# Patient Record
Sex: FEMALE | Race: WHITE | Employment: FULL TIME | URBAN - METROPOLITAN AREA
[De-identification: names, ages, dates, MRNs, and addresses within clinical notes are randomized per-mention and may not be internally consistent; named-entity substitution may affect disease eponyms.]

---

## 2017-01-04 DIAGNOSIS — R07.2 PRECORDIAL PAIN: ICD-10-CM

## 2017-01-04 DIAGNOSIS — R00.2 PALPITATIONS: ICD-10-CM

## 2017-01-04 RX ORDER — IBUPROFEN 800 MG/1
800 TABLET ORAL 3 TIMES DAILY
Qty: 90 TABLET | Refills: 0 | Status: SHIPPED | OUTPATIENT
Start: 2017-01-04 | End: 2018-03-04 | Stop reason: ALTCHOICE

## 2017-04-19 ENCOUNTER — PATIENT MESSAGE (OUTPATIENT)
Dept: FAMILY MEDICINE CLINIC | Age: 27
End: 2017-04-19

## 2017-04-19 RX ORDER — TRAMADOL HYDROCHLORIDE 50 MG/1
TABLET ORAL
Qty: 30 TABLET | Refills: 0 | Status: SHIPPED | OUTPATIENT
Start: 2017-04-19 | End: 2018-03-04 | Stop reason: ALTCHOICE

## 2017-04-27 ENCOUNTER — TELEPHONE (OUTPATIENT)
Dept: FAMILY MEDICINE CLINIC | Age: 27
End: 2017-04-27

## 2017-06-06 RX ORDER — LEVONORGESTREL AND ETHINYL ESTRADIOL 0.15-0.03
1 KIT ORAL DAILY
Qty: 1 PACKET | Refills: 3 | Status: SHIPPED | OUTPATIENT
Start: 2017-06-06 | End: 2019-01-02

## 2017-06-12 ENCOUNTER — TELEPHONE (OUTPATIENT)
Dept: FAMILY MEDICINE CLINIC | Age: 27
End: 2017-06-12

## 2017-06-29 RX ORDER — LORAZEPAM 0.5 MG/1
TABLET ORAL
Qty: 30 TABLET | Refills: 0 | Status: SHIPPED | OUTPATIENT
Start: 2017-06-29 | End: 2018-03-04 | Stop reason: ALTCHOICE

## 2017-06-29 RX ORDER — BUPROPION HYDROCHLORIDE 150 MG/1
150 TABLET ORAL EVERY MORNING
Qty: 30 TABLET | Refills: 1 | Status: SHIPPED | OUTPATIENT
Start: 2017-06-29 | End: 2018-03-04 | Stop reason: ALTCHOICE

## 2019-01-02 ENCOUNTER — OFFICE VISIT (OUTPATIENT)
Dept: FAMILY MEDICINE CLINIC | Age: 29
End: 2019-01-02
Payer: COMMERCIAL

## 2019-01-02 VITALS
BODY MASS INDEX: 44.22 KG/M2 | OXYGEN SATURATION: 99 % | WEIGHT: 274 LBS | DIASTOLIC BLOOD PRESSURE: 64 MMHG | HEART RATE: 92 BPM | SYSTOLIC BLOOD PRESSURE: 134 MMHG

## 2019-01-02 DIAGNOSIS — N64.4 MASTALGIA: Primary | ICD-10-CM

## 2019-01-02 PROCEDURE — G8427 DOCREV CUR MEDS BY ELIG CLIN: HCPCS | Performed by: PHYSICIAN ASSISTANT

## 2019-01-02 PROCEDURE — 99213 OFFICE O/P EST LOW 20 MIN: CPT | Performed by: PHYSICIAN ASSISTANT

## 2019-01-02 PROCEDURE — G8417 CALC BMI ABV UP PARAM F/U: HCPCS | Performed by: PHYSICIAN ASSISTANT

## 2019-01-02 PROCEDURE — 1036F TOBACCO NON-USER: CPT | Performed by: PHYSICIAN ASSISTANT

## 2019-01-02 PROCEDURE — G8484 FLU IMMUNIZE NO ADMIN: HCPCS | Performed by: PHYSICIAN ASSISTANT

## 2019-01-02 ASSESSMENT — ENCOUNTER SYMPTOMS
VOMITING: 0
ABDOMINAL PAIN: 0
RHINORRHEA: 0
COUGH: 0
CONSTIPATION: 0
SHORTNESS OF BREATH: 0
NAUSEA: 0
SORE THROAT: 0
DIARRHEA: 0

## 2019-01-02 ASSESSMENT — PATIENT HEALTH QUESTIONNAIRE - PHQ9
SUM OF ALL RESPONSES TO PHQ QUESTIONS 1-9: 0
2. FEELING DOWN, DEPRESSED OR HOPELESS: 0
SUM OF ALL RESPONSES TO PHQ QUESTIONS 1-9: 0
1. LITTLE INTEREST OR PLEASURE IN DOING THINGS: 0
SUM OF ALL RESPONSES TO PHQ9 QUESTIONS 1 & 2: 0

## 2019-01-03 ENCOUNTER — HOSPITAL ENCOUNTER (OUTPATIENT)
Dept: WOMENS IMAGING | Age: 29
Discharge: HOME | End: 2019-01-03
Payer: COMMERCIAL

## 2019-01-03 DIAGNOSIS — N63.0 SWELLING OF BREAST: ICD-10-CM

## 2019-01-03 DIAGNOSIS — N63.0 LUMP OR MASS IN BREAST: ICD-10-CM

## 2019-01-03 PROCEDURE — 76642 ULTRASOUND BREAST LIMITED: CPT

## 2019-01-04 ENCOUNTER — HOSPITAL ENCOUNTER (OUTPATIENT)
Dept: WOMENS IMAGING | Age: 29
Discharge: HOME | End: 2019-01-04
Payer: COMMERCIAL

## 2019-01-04 ENCOUNTER — OFFICE VISIT (OUTPATIENT)
Dept: FAMILY MEDICINE CLINIC | Age: 29
End: 2019-01-04
Payer: COMMERCIAL

## 2019-01-04 VITALS
OXYGEN SATURATION: 98 % | TEMPERATURE: 98.6 F | WEIGHT: 271 LBS | DIASTOLIC BLOOD PRESSURE: 70 MMHG | BODY MASS INDEX: 43.74 KG/M2 | SYSTOLIC BLOOD PRESSURE: 110 MMHG | HEART RATE: 69 BPM

## 2019-01-04 DIAGNOSIS — N64.4 BREAST PAIN: ICD-10-CM

## 2019-01-04 DIAGNOSIS — N64.4 BREAST PAIN: Primary | ICD-10-CM

## 2019-01-04 DIAGNOSIS — R92.8 ABNORMAL SCREENING MAMMOGRAM: Primary | ICD-10-CM

## 2019-01-04 DIAGNOSIS — N61.0 CELLULITIS OF FEMALE BREAST: ICD-10-CM

## 2019-01-04 PROCEDURE — 99214 OFFICE O/P EST MOD 30 MIN: CPT | Performed by: FAMILY MEDICINE

## 2019-01-04 PROCEDURE — G0279 TOMOSYNTHESIS, MAMMO: HCPCS

## 2019-01-04 PROCEDURE — G8427 DOCREV CUR MEDS BY ELIG CLIN: HCPCS | Performed by: FAMILY MEDICINE

## 2019-01-04 PROCEDURE — G8417 CALC BMI ABV UP PARAM F/U: HCPCS | Performed by: FAMILY MEDICINE

## 2019-01-04 PROCEDURE — G8484 FLU IMMUNIZE NO ADMIN: HCPCS | Performed by: FAMILY MEDICINE

## 2019-01-04 PROCEDURE — 1036F TOBACCO NON-USER: CPT | Performed by: FAMILY MEDICINE

## 2019-01-04 RX ORDER — DOXYCYCLINE HYCLATE 100 MG
100 TABLET ORAL 2 TIMES DAILY
Qty: 14 TABLET | Refills: 0 | Status: SHIPPED | OUTPATIENT
Start: 2019-01-04 | End: 2019-01-11

## 2019-01-04 ASSESSMENT — ENCOUNTER SYMPTOMS
NAUSEA: 0
SHORTNESS OF BREATH: 0
COLOR CHANGE: 1

## 2019-01-07 ENCOUNTER — TELEPHONE (OUTPATIENT)
Dept: FAMILY MEDICINE CLINIC | Age: 29
End: 2019-01-07

## 2019-01-07 DIAGNOSIS — N64.4 BREAST PAIN: Primary | ICD-10-CM

## 2019-01-07 RX ORDER — NAPROXEN 500 MG/1
500 TABLET ORAL 2 TIMES DAILY WITH MEALS
Qty: 60 TABLET | Refills: 1 | Status: SHIPPED | OUTPATIENT
Start: 2019-01-07 | End: 2019-05-24

## 2019-01-08 ENCOUNTER — TELEPHONE (OUTPATIENT)
Dept: FAMILY MEDICINE CLINIC | Age: 29
End: 2019-01-08

## 2019-05-24 ENCOUNTER — OFFICE VISIT (OUTPATIENT)
Dept: FAMILY MEDICINE CLINIC | Age: 29
End: 2019-05-24
Payer: COMMERCIAL

## 2019-05-24 VITALS
HEIGHT: 67 IN | WEIGHT: 269.6 LBS | DIASTOLIC BLOOD PRESSURE: 80 MMHG | SYSTOLIC BLOOD PRESSURE: 140 MMHG | BODY MASS INDEX: 42.31 KG/M2 | OXYGEN SATURATION: 98 % | HEART RATE: 89 BPM

## 2019-05-24 DIAGNOSIS — M54.12 LEFT CERVICAL RADICULOPATHY: ICD-10-CM

## 2019-05-24 DIAGNOSIS — F41.9 ANXIETY: ICD-10-CM

## 2019-05-24 DIAGNOSIS — M76.899 TENDINITIS OF HIP, UNSPECIFIED LATERALITY: ICD-10-CM

## 2019-05-24 DIAGNOSIS — Z00.00 ANNUAL PHYSICAL EXAM: Primary | ICD-10-CM

## 2019-05-24 PROCEDURE — 99395 PREV VISIT EST AGE 18-39: CPT | Performed by: FAMILY MEDICINE

## 2019-05-24 RX ORDER — GABAPENTIN 600 MG/1
TABLET ORAL
Qty: 60 TABLET | Refills: 1 | Status: SHIPPED | OUTPATIENT
Start: 2019-05-24 | End: 2019-06-17 | Stop reason: SDUPTHER

## 2019-05-24 ASSESSMENT — ENCOUNTER SYMPTOMS
COUGH: 0
ABDOMINAL PAIN: 0
SHORTNESS OF BREATH: 0
BLOOD IN STOOL: 0

## 2019-05-24 NOTE — PATIENT INSTRUCTIONS
nnual physical exam  -     Lipid Panel; Future  -     Comprehensive Metabolic Panel; Future  -     C-reactive protein;  Future  -     CBC Auto Differential; Future  AGC-work on wt loss  Anxiety    Tendinitis of hip, unspecified laterality  Rx Gabap 600  Left cervical radiculopathy  As above

## 2019-05-24 NOTE — PROGRESS NOTES
ext & Lt rotation>tingling Lt arm---very tender both Gr Tr   Neurological: She is alert and oriented to person, place, and time. Psychiatric: She has a normal mood and affect. Her behavior is normal. Judgment and thought content normal.       Assessment:      1. Annual physical exam    2. Anxiety    3. Tendinitis of hip, unspecified laterality    4. Left cervical radiculopathy            Plan:      Jose M Xavier was seen today for annual exam.    Diagnoses and all orders for this visit:    Annual physical exam  -     Lipid Panel; Future  -     Comprehensive Metabolic Panel; Future  -     C-reactive protein;  Future  -     CBC Auto Differential; Future  AGC-work on wt loss  Anxiety  Consider anti depress  Tendinitis of hip, unspecified laterality  Rx Gabap 600  Left cervical radiculopathy  As above            Marc Verdin, DO

## 2019-05-25 ENCOUNTER — HOSPITAL ENCOUNTER (OUTPATIENT)
Age: 29
Discharge: HOME | End: 2019-05-25
Payer: COMMERCIAL

## 2019-05-25 DIAGNOSIS — Z00.00 ANNUAL PHYSICAL EXAM: ICD-10-CM

## 2019-05-25 LAB
A/G RATIO: 1.2 (ref 1.1–2.2)
ALBUMIN SERPL-MCNC: 4.2 G/DL (ref 3.4–5)
ALP BLD-CCNC: 97 U/L (ref 40–129)
ALT SERPL-CCNC: 17 U/L (ref 10–40)
ANION GAP SERPL CALCULATED.3IONS-SCNC: 13 MMOL/L (ref 3–16)
AST SERPL-CCNC: 17 U/L (ref 15–37)
BASOPHILS ABSOLUTE: 0 K/UL (ref 0–0.2)
BASOPHILS RELATIVE PERCENT: 0.7 %
BILIRUB SERPL-MCNC: 0.6 MG/DL (ref 0–1)
BUN BLDV-MCNC: 10 MG/DL (ref 7–20)
CALCIUM SERPL-MCNC: 8.7 MG/DL (ref 8.3–10.6)
CHLORIDE BLD-SCNC: 100 MMOL/L (ref 99–110)
CHOLESTEROL, TOTAL: 121 MG/DL (ref 0–199)
CO2: 25 MMOL/L (ref 21–32)
CREAT SERPL-MCNC: 0.8 MG/DL (ref 0.6–1.1)
EOSINOPHILS ABSOLUTE: 0.1 K/UL (ref 0–0.6)
EOSINOPHILS RELATIVE PERCENT: 2 %
GFR AFRICAN AMERICAN: >60
GFR NON-AFRICAN AMERICAN: >60
GLOBULIN: 3.4 G/DL
GLUCOSE BLD-MCNC: 92 MG/DL (ref 70–99)
HCT VFR BLD CALC: 37.3 % (ref 36–48)
HDLC SERPL-MCNC: 34 MG/DL (ref 40–60)
HEMOGLOBIN: 12.1 G/DL (ref 12–16)
LDL CHOLESTEROL CALCULATED: 77 MG/DL
LYMPHOCYTES ABSOLUTE: 1.6 K/UL (ref 1–5.1)
LYMPHOCYTES RELATIVE PERCENT: 24.6 %
MCH RBC QN AUTO: 26.1 PG (ref 26–34)
MCHC RBC AUTO-ENTMCNC: 32.5 G/DL (ref 31–36)
MCV RBC AUTO: 80.3 FL (ref 80–100)
MONOCYTES ABSOLUTE: 0.3 K/UL (ref 0–1.3)
MONOCYTES RELATIVE PERCENT: 5.2 %
NEUTROPHILS ABSOLUTE: 4.3 K/UL (ref 1.7–7.7)
NEUTROPHILS RELATIVE PERCENT: 67.5 %
PDW BLD-RTO: 14.5 % (ref 12.4–15.4)
PLATELET # BLD: 311 K/UL (ref 135–450)
PMV BLD AUTO: 8.9 FL (ref 5–10.5)
POTASSIUM SERPL-SCNC: 4.1 MMOL/L (ref 3.5–5.1)
RBC # BLD: 4.64 M/UL (ref 4–5.2)
SODIUM BLD-SCNC: 138 MMOL/L (ref 136–145)
TOTAL PROTEIN: 7.6 G/DL (ref 6.4–8.2)
TRIGL SERPL-MCNC: 52 MG/DL (ref 0–150)
VLDLC SERPL CALC-MCNC: 10 MG/DL
WBC # BLD: 6.4 K/UL (ref 4–11)

## 2019-05-25 PROCEDURE — 80053 COMPREHEN METABOLIC PANEL: CPT

## 2019-05-25 PROCEDURE — 85025 COMPLETE CBC W/AUTO DIFF WBC: CPT

## 2019-05-25 PROCEDURE — 86140 C-REACTIVE PROTEIN: CPT

## 2019-05-25 PROCEDURE — 36415 COLL VENOUS BLD VENIPUNCTURE: CPT

## 2019-05-25 PROCEDURE — 80061 LIPID PANEL: CPT

## 2019-05-28 LAB — C-REACTIVE PROTEIN: 9.7 MG/L (ref 0–5.1)

## 2019-06-03 ENCOUNTER — NURSE ONLY (OUTPATIENT)
Dept: FAMILY MEDICINE CLINIC | Age: 29
End: 2019-06-03

## 2019-06-03 VITALS — SYSTOLIC BLOOD PRESSURE: 130 MMHG | DIASTOLIC BLOOD PRESSURE: 84 MMHG

## 2019-06-03 DIAGNOSIS — Z00.00 ANNUAL PHYSICAL EXAM: Primary | ICD-10-CM

## 2019-06-06 DIAGNOSIS — F41.9 ANXIETY: Primary | ICD-10-CM

## 2019-06-06 RX ORDER — LORAZEPAM 0.5 MG/1
TABLET ORAL
Qty: 25 TABLET | Refills: 0 | Status: SHIPPED | OUTPATIENT
Start: 2019-06-06 | End: 2019-07-09 | Stop reason: SDUPTHER

## 2019-06-17 ENCOUNTER — TELEPHONE (OUTPATIENT)
Dept: FAMILY MEDICINE CLINIC | Age: 29
End: 2019-06-17

## 2019-06-17 DIAGNOSIS — E66.9 OBESITY (BMI 30-39.9): Primary | ICD-10-CM

## 2019-06-17 DIAGNOSIS — E66.01 CLASS 3 SEVERE OBESITY WITHOUT SERIOUS COMORBIDITY WITH BODY MASS INDEX (BMI) OF 40.0 TO 44.9 IN ADULT, UNSPECIFIED OBESITY TYPE (HCC): ICD-10-CM

## 2019-06-17 RX ORDER — GABAPENTIN 600 MG/1
TABLET ORAL
Qty: 90 TABLET | Refills: 1 | Status: SHIPPED | OUTPATIENT
Start: 2019-06-17 | End: 2019-08-14 | Stop reason: SDUPTHER

## 2019-07-09 DIAGNOSIS — F41.9 ANXIETY: ICD-10-CM

## 2019-07-09 RX ORDER — LORAZEPAM 0.5 MG/1
TABLET ORAL
Qty: 25 TABLET | Refills: 0 | Status: SHIPPED | OUTPATIENT
Start: 2019-07-09 | End: 2019-08-08

## 2019-07-29 ENCOUNTER — TELEPHONE (OUTPATIENT)
Dept: INTERNAL MEDICINE CLINIC | Age: 29
End: 2019-07-29

## 2019-08-14 RX ORDER — GABAPENTIN 600 MG/1
TABLET ORAL
Qty: 90 TABLET | Refills: 1 | Status: SHIPPED | OUTPATIENT
Start: 2019-08-14 | End: 2019-10-10 | Stop reason: SDUPTHER

## 2019-08-28 ENCOUNTER — ANESTHESIA EVENT (OUTPATIENT)
Dept: OPERATING ROOM | Age: 29
End: 2019-08-28
Payer: COMMERCIAL

## 2019-08-29 ENCOUNTER — ANESTHESIA (OUTPATIENT)
Dept: OPERATING ROOM | Age: 29
End: 2019-08-29
Payer: COMMERCIAL

## 2019-08-29 ENCOUNTER — HOSPITAL ENCOUNTER (OUTPATIENT)
Age: 29
Setting detail: OUTPATIENT SURGERY
Discharge: HOME | End: 2019-08-29
Attending: SURGERY | Admitting: SURGERY
Payer: COMMERCIAL

## 2019-08-29 VITALS
TEMPERATURE: 98.1 F | SYSTOLIC BLOOD PRESSURE: 116 MMHG | HEIGHT: 67 IN | HEART RATE: 70 BPM | RESPIRATION RATE: 18 BRPM | OXYGEN SATURATION: 99 % | DIASTOLIC BLOOD PRESSURE: 66 MMHG | BODY MASS INDEX: 42.38 KG/M2 | WEIGHT: 270 LBS

## 2019-08-29 VITALS
OXYGEN SATURATION: 94 % | RESPIRATION RATE: 13 BRPM | DIASTOLIC BLOOD PRESSURE: 55 MMHG | SYSTOLIC BLOOD PRESSURE: 107 MMHG

## 2019-08-29 DIAGNOSIS — N61.1 ABSCESS OF RIGHT BREAST: ICD-10-CM

## 2019-08-29 LAB
BASOPHILS ABSOLUTE: 0.1 K/UL (ref 0–0.2)
BASOPHILS RELATIVE PERCENT: 0.7 %
EOSINOPHILS ABSOLUTE: 0.2 K/UL (ref 0–0.6)
EOSINOPHILS RELATIVE PERCENT: 3.3 %
HCT VFR BLD CALC: 37.1 % (ref 36–48)
HEMOGLOBIN: 12.2 G/DL (ref 12–16)
LYMPHOCYTES ABSOLUTE: 1.6 K/UL (ref 1–5.1)
LYMPHOCYTES RELATIVE PERCENT: 22.5 %
MCH RBC QN AUTO: 26.7 PG (ref 26–34)
MCHC RBC AUTO-ENTMCNC: 32.9 G/DL (ref 31–36)
MCV RBC AUTO: 81 FL (ref 80–100)
MONOCYTES ABSOLUTE: 0.5 K/UL (ref 0–1.3)
MONOCYTES RELATIVE PERCENT: 6.4 %
NEUTROPHILS ABSOLUTE: 4.7 K/UL (ref 1.7–7.7)
NEUTROPHILS RELATIVE PERCENT: 67.1 %
PDW BLD-RTO: 14.3 % (ref 12.4–15.4)
PLATELET # BLD: 273 K/UL (ref 135–450)
PMV BLD AUTO: 8.5 FL (ref 5–10.5)
PREGNANCY, URINE: NEGATIVE
RBC # BLD: 4.58 M/UL (ref 4–5.2)
WBC # BLD: 7.1 K/UL (ref 4–11)

## 2019-08-29 PROCEDURE — 2580000003 HC RX 258: Performed by: ANESTHESIOLOGY

## 2019-08-29 PROCEDURE — 7100000001 HC PACU RECOVERY - ADDTL 15 MIN: Performed by: SURGERY

## 2019-08-29 PROCEDURE — 87070 CULTURE OTHR SPECIMN AEROBIC: CPT

## 2019-08-29 PROCEDURE — 2500000003 HC RX 250 WO HCPCS: Performed by: SURGERY

## 2019-08-29 PROCEDURE — 2500000003 HC RX 250 WO HCPCS: Performed by: NURSE ANESTHETIST, CERTIFIED REGISTERED

## 2019-08-29 PROCEDURE — 2580000003 HC RX 258: Performed by: SURGERY

## 2019-08-29 PROCEDURE — 87075 CULTR BACTERIA EXCEPT BLOOD: CPT

## 2019-08-29 PROCEDURE — 7100000010 HC PHASE II RECOVERY - FIRST 15 MIN: Performed by: SURGERY

## 2019-08-29 PROCEDURE — 88305 TISSUE EXAM BY PATHOLOGIST: CPT

## 2019-08-29 PROCEDURE — 6360000002 HC RX W HCPCS: Performed by: ANESTHESIOLOGY

## 2019-08-29 PROCEDURE — 3600000012 HC SURGERY LEVEL 2 ADDTL 15MIN: Performed by: SURGERY

## 2019-08-29 PROCEDURE — 3600000002 HC SURGERY LEVEL 2 BASE: Performed by: SURGERY

## 2019-08-29 PROCEDURE — 3700000001 HC ADD 15 MINUTES (ANESTHESIA): Performed by: SURGERY

## 2019-08-29 PROCEDURE — 7100000011 HC PHASE II RECOVERY - ADDTL 15 MIN: Performed by: SURGERY

## 2019-08-29 PROCEDURE — 85025 COMPLETE CBC W/AUTO DIFF WBC: CPT

## 2019-08-29 PROCEDURE — 2709999900 HC NON-CHARGEABLE SUPPLY: Performed by: SURGERY

## 2019-08-29 PROCEDURE — 84703 CHORIONIC GONADOTROPIN ASSAY: CPT

## 2019-08-29 PROCEDURE — 3700000000 HC ANESTHESIA ATTENDED CARE: Performed by: SURGERY

## 2019-08-29 PROCEDURE — 6370000000 HC RX 637 (ALT 250 FOR IP): Performed by: ANESTHESIOLOGY

## 2019-08-29 PROCEDURE — 87076 CULTURE ANAEROBE IDENT EACH: CPT

## 2019-08-29 PROCEDURE — 7100000000 HC PACU RECOVERY - FIRST 15 MIN: Performed by: SURGERY

## 2019-08-29 PROCEDURE — 6360000002 HC RX W HCPCS: Performed by: NURSE ANESTHETIST, CERTIFIED REGISTERED

## 2019-08-29 PROCEDURE — 6360000002 HC RX W HCPCS: Performed by: SURGERY

## 2019-08-29 PROCEDURE — 87205 SMEAR GRAM STAIN: CPT

## 2019-08-29 RX ORDER — SODIUM CHLORIDE 0.9 % (FLUSH) 0.9 %
10 SYRINGE (ML) INJECTION EVERY 12 HOURS SCHEDULED
Status: DISCONTINUED | OUTPATIENT
Start: 2019-08-29 | End: 2019-08-29 | Stop reason: HOSPADM

## 2019-08-29 RX ORDER — SULFAMETHOXAZOLE AND TRIMETHOPRIM 800; 160 MG/1; MG/1
1 TABLET ORAL 2 TIMES DAILY
Qty: 14 TABLET | Refills: 0 | Status: SHIPPED | OUTPATIENT
Start: 2019-08-29 | End: 2019-09-05

## 2019-08-29 RX ORDER — PROPOFOL 10 MG/ML
INJECTION, EMULSION INTRAVENOUS PRN
Status: DISCONTINUED | OUTPATIENT
Start: 2019-08-29 | End: 2019-08-29 | Stop reason: SDUPTHER

## 2019-08-29 RX ORDER — PROPOFOL 10 MG/ML
INJECTION, EMULSION INTRAVENOUS CONTINUOUS PRN
Status: DISCONTINUED | OUTPATIENT
Start: 2019-08-29 | End: 2019-08-29 | Stop reason: SDUPTHER

## 2019-08-29 RX ORDER — MIDAZOLAM HYDROCHLORIDE 1 MG/ML
INJECTION INTRAMUSCULAR; INTRAVENOUS PRN
Status: DISCONTINUED | OUTPATIENT
Start: 2019-08-29 | End: 2019-08-29 | Stop reason: SDUPTHER

## 2019-08-29 RX ORDER — SODIUM CHLORIDE 9 MG/ML
INJECTION, SOLUTION INTRAVENOUS CONTINUOUS
Status: DISCONTINUED | OUTPATIENT
Start: 2019-08-29 | End: 2019-08-29 | Stop reason: HOSPADM

## 2019-08-29 RX ORDER — FENTANYL CITRATE 50 UG/ML
INJECTION, SOLUTION INTRAMUSCULAR; INTRAVENOUS PRN
Status: DISCONTINUED | OUTPATIENT
Start: 2019-08-29 | End: 2019-08-29 | Stop reason: SDUPTHER

## 2019-08-29 RX ORDER — OXYCODONE HYDROCHLORIDE AND ACETAMINOPHEN 5; 325 MG/1; MG/1
2 TABLET ORAL PRN
Status: COMPLETED | OUTPATIENT
Start: 2019-08-29 | End: 2019-08-29

## 2019-08-29 RX ORDER — FENTANYL CITRATE 50 UG/ML
25 INJECTION, SOLUTION INTRAMUSCULAR; INTRAVENOUS EVERY 5 MIN PRN
Status: DISCONTINUED | OUTPATIENT
Start: 2019-08-29 | End: 2019-08-29 | Stop reason: HOSPADM

## 2019-08-29 RX ORDER — ONDANSETRON 2 MG/ML
4 INJECTION INTRAMUSCULAR; INTRAVENOUS
Status: COMPLETED | OUTPATIENT
Start: 2019-08-29 | End: 2019-08-29

## 2019-08-29 RX ORDER — HYDROCODONE BITARTRATE AND ACETAMINOPHEN 5; 325 MG/1; MG/1
1 TABLET ORAL EVERY 4 HOURS PRN
Qty: 15 TABLET | Refills: 0 | Status: SHIPPED | OUTPATIENT
Start: 2019-08-29 | End: 2019-09-01

## 2019-08-29 RX ORDER — OXYCODONE HYDROCHLORIDE AND ACETAMINOPHEN 5; 325 MG/1; MG/1
1 TABLET ORAL PRN
Status: COMPLETED | OUTPATIENT
Start: 2019-08-29 | End: 2019-08-29

## 2019-08-29 RX ORDER — SODIUM CHLORIDE 0.9 % (FLUSH) 0.9 %
10 SYRINGE (ML) INJECTION PRN
Status: DISCONTINUED | OUTPATIENT
Start: 2019-08-29 | End: 2019-08-29 | Stop reason: HOSPADM

## 2019-08-29 RX ORDER — LIDOCAINE HYDROCHLORIDE 10 MG/ML
INJECTION, SOLUTION INFILTRATION; PERINEURAL
Status: COMPLETED | OUTPATIENT
Start: 2019-08-29 | End: 2019-08-29

## 2019-08-29 RX ORDER — MAGNESIUM HYDROXIDE 1200 MG/15ML
LIQUID ORAL CONTINUOUS PRN
Status: COMPLETED | OUTPATIENT
Start: 2019-08-29 | End: 2019-08-29

## 2019-08-29 RX ORDER — LIDOCAINE HYDROCHLORIDE 20 MG/ML
INJECTION, SOLUTION EPIDURAL; INFILTRATION; INTRACAUDAL; PERINEURAL PRN
Status: DISCONTINUED | OUTPATIENT
Start: 2019-08-29 | End: 2019-08-29 | Stop reason: SDUPTHER

## 2019-08-29 RX ORDER — APREPITANT 40 MG/1
40 CAPSULE ORAL ONCE
Status: COMPLETED | OUTPATIENT
Start: 2019-08-29 | End: 2019-08-29

## 2019-08-29 RX ADMIN — OXYCODONE HYDROCHLORIDE AND ACETAMINOPHEN 1 TABLET: 5; 325 TABLET ORAL at 10:02

## 2019-08-29 RX ADMIN — SODIUM CHLORIDE: 9 INJECTION, SOLUTION INTRAVENOUS at 07:24

## 2019-08-29 RX ADMIN — LIDOCAINE HYDROCHLORIDE 100 MG: 20 INJECTION, SOLUTION EPIDURAL; INFILTRATION; INTRACAUDAL; PERINEURAL at 07:34

## 2019-08-29 RX ADMIN — SODIUM CHLORIDE: 9 INJECTION, SOLUTION INTRAVENOUS at 06:29

## 2019-08-29 RX ADMIN — MIDAZOLAM 2 MG: 1 INJECTION INTRAMUSCULAR; INTRAVENOUS at 07:30

## 2019-08-29 RX ADMIN — HYDROMORPHONE HYDROCHLORIDE 0.5 MG: 1 INJECTION, SOLUTION INTRAMUSCULAR; INTRAVENOUS; SUBCUTANEOUS at 08:28

## 2019-08-29 RX ADMIN — FENTANYL CITRATE 25 MCG: 50 INJECTION INTRAMUSCULAR; INTRAVENOUS at 07:41

## 2019-08-29 RX ADMIN — VANCOMYCIN HYDROCHLORIDE 2000 MG: 1 INJECTION, POWDER, LYOPHILIZED, FOR SOLUTION INTRAVENOUS at 06:36

## 2019-08-29 RX ADMIN — FENTANYL CITRATE 25 MCG: 50 INJECTION, SOLUTION INTRAMUSCULAR; INTRAVENOUS at 08:42

## 2019-08-29 RX ADMIN — FENTANYL CITRATE 25 MCG: 50 INJECTION INTRAMUSCULAR; INTRAVENOUS at 07:37

## 2019-08-29 RX ADMIN — ONDANSETRON 4 MG: 2 INJECTION INTRAMUSCULAR; INTRAVENOUS at 08:36

## 2019-08-29 RX ADMIN — FENTANYL CITRATE 50 MCG: 50 INJECTION INTRAMUSCULAR; INTRAVENOUS at 07:32

## 2019-08-29 RX ADMIN — PROPOFOL 150 MCG/KG/MIN: 10 INJECTION, EMULSION INTRAVENOUS at 07:34

## 2019-08-29 RX ADMIN — PROPOFOL 70 MG: 10 INJECTION, EMULSION INTRAVENOUS at 07:34

## 2019-08-29 RX ADMIN — APREPITANT 40 MG: 40 CAPSULE ORAL at 06:43

## 2019-08-29 ASSESSMENT — PAIN - FUNCTIONAL ASSESSMENT
PAIN_FUNCTIONAL_ASSESSMENT: ACTIVITIES ARE NOT PREVENTED
PAIN_FUNCTIONAL_ASSESSMENT: ACTIVITIES ARE NOT PREVENTED
PAIN_FUNCTIONAL_ASSESSMENT: 0-10
PAIN_FUNCTIONAL_ASSESSMENT: ACTIVITIES ARE NOT PREVENTED

## 2019-08-29 ASSESSMENT — PAIN DESCRIPTION - FREQUENCY
FREQUENCY: CONTINUOUS

## 2019-08-29 ASSESSMENT — PAIN SCALES - GENERAL
PAINLEVEL_OUTOF10: 5
PAINLEVEL_OUTOF10: 3
PAINLEVEL_OUTOF10: 4
PAINLEVEL_OUTOF10: 3
PAINLEVEL_OUTOF10: 5
PAINLEVEL_OUTOF10: 7
PAINLEVEL_OUTOF10: 3

## 2019-08-29 ASSESSMENT — PULMONARY FUNCTION TESTS
PIF_VALUE: 1
PIF_VALUE: 0
PIF_VALUE: 1
PIF_VALUE: 0

## 2019-08-29 ASSESSMENT — PAIN DESCRIPTION - ORIENTATION
ORIENTATION: RIGHT

## 2019-08-29 ASSESSMENT — PAIN DESCRIPTION - PROGRESSION
CLINICAL_PROGRESSION: GRADUALLY IMPROVING
CLINICAL_PROGRESSION: GRADUALLY WORSENING
CLINICAL_PROGRESSION: NOT CHANGED
CLINICAL_PROGRESSION: GRADUALLY IMPROVING

## 2019-08-29 ASSESSMENT — PAIN DESCRIPTION - DESCRIPTORS
DESCRIPTORS: BURNING
DESCRIPTORS: DULL;ACHING
DESCRIPTORS: BURNING

## 2019-08-29 ASSESSMENT — PAIN DESCRIPTION - PAIN TYPE
TYPE: SURGICAL PAIN

## 2019-08-29 ASSESSMENT — PAIN DESCRIPTION - ONSET
ONSET: ON-GOING
ONSET: AWAKENED FROM SLEEP
ONSET: ON-GOING
ONSET: ON-GOING

## 2019-08-29 ASSESSMENT — PAIN DESCRIPTION - LOCATION
LOCATION: BREAST

## 2019-08-29 NOTE — LETTER
[facility]  [address] [facility]  Phone: [phone]    Corey Bass MD        August 29, 2019      Please excuse Misa Coleman from work today. He is the designated family member and  for a patient. Sincerely,        Dr. Lee Olivas / Shirley Saenz.  Pardeep Joel

## 2019-08-29 NOTE — ANESTHESIA POSTPROCEDURE EVALUATION
Department of Anesthesiology  Postprocedure Note    Patient: Kalpana Szymanski  MRN: [id number]  YOB: 1990  Date of evaluation: 8/29/2019  Time:  8:13 AM     Procedure Summary     Date:  08/29/19 Room / Location:  [facility] OR  / [facility] OR    Anesthesia Start:  0730 Anesthesia Stop:  0801    Procedure:  EXPLORATION AND DEBRIDEMENT CHRONIC RIGHT BREAST ABSCESS (Right Breast) Diagnosis:       Abscess of right breast      (CHRONIC RIGHT BREAST ABSCESS)    Surgeon:  Daniel Gan MD Responsible Provider:  Anthony Lee MD    Anesthesia Type:  MAC ASA Status:  3          Anesthesia Type: MAC    Chinedu Phase I: Chinedu Score: 5    Chinedu Phase II:      Last vitals: Reviewed and per EMR flowsheets.        Anesthesia Post Evaluation    Patient location during evaluation: bedside  Patient participation: complete - patient participated  Level of consciousness: awake  Pain score: 1  Airway patency: patent  Nausea & Vomiting: no nausea and no vomiting  Complications: no  Cardiovascular status: blood pressure returned to baseline  Respiratory status: acceptable  Hydration status: euvolemic

## 2019-08-29 NOTE — ANESTHESIA PRE PROCEDURE
ondansetron (ZOFRAN) injection 4 mg  4 mg Intravenous Once PRN Hoang Bess MD         Vital Signs (Current)   Vitals:    19 1204 19   BP:  127/71   Pulse:  72   Resp:  16   Temp:  97 °F (36.1 °C)   TempSrc:  Temporal   SpO2:  100%   Weight: 270 lb (122.5 kg) 270 lb (122.5 kg)   Height: 5' 7\" (1.702 m) 5' 7\" (1.702 m)                                          BP Readings from Last 3 Encounters:   19 127/71   19 130/84   19 (!) 140/80     Vital Signs Statistics (for past 48 hrs)     Temp  Av °F (36.1 °C)  Min: 97 °F (36.1 °C)   Min taken time: 19  Max: 97 °F (36.1 °C)   Max taken time: 19  Pulse  Av  Min: 67   Min taken time: 1915  Max: 67   Max taken time: 1915  Resp  Av  Min: 16   Min taken time: 19  Max: 12   Max taken time: 19  BP  Min: 127/71   Min taken time: 1915  Max: 127/71   Max taken time: 19  SpO2  Av %  Min: 100 %   Min taken time: 19  Max: 100 %   Max taken time: 19  BP Readings from Last 3 Encounters:   19 127/71   19 130/84   19 (!) 140/80       BMI  Body mass index is 42.29 kg/m². Estimated body mass index is 42.29 kg/m² as calculated from the following:    Height as of this encounter: 5' 7\" (1.702 m). Weight as of this encounter: 270 lb (122.5 kg).     CBC   Lab Results   Component Value Date    WBC 6.4 2019    RBC 4.64 2019    HGB 12.1 2019    HCT 37.3 2019    MCV 80.3 2019    RDW 14.5 2019     2019     CMP    Lab Results   Component Value Date     2019    K 4.1 2019     2019    CO2 25 2019    BUN 10 2019    CREATININE 0.8 2019    GFRAA >60 2019    GFRAA >60 2013    AGRATIO 1.2 2019    LABGLOM >60 2019    GLUCOSE 92 2019    PROT 7.6 2019    PROT 7.5 2013    CALCIUM 8.7 2019 BILITOT 0.6 05/25/2019    ALKPHOS 97 05/25/2019    AST 17 05/25/2019    ALT 17 05/25/2019     BMP    Lab Results   Component Value Date     05/25/2019    K 4.1 05/25/2019     05/25/2019    CO2 25 05/25/2019    BUN 10 05/25/2019    CREATININE 0.8 05/25/2019    CALCIUM 8.7 05/25/2019    GFRAA >60 05/25/2019    GFRAA >60 04/09/2013    LABGLOM >60 05/25/2019    GLUCOSE 92 05/25/2019     POCGlucose  No results for input(s): GLUCOSE in the last 72 hours. Coags    Lab Results   Component Value Date    PROTIME 11.5 05/24/2015    INR 1.06 05/24/2015    APTT 28.6 09/59/8948     HCG (If Applicable)   Lab Results   Component Value Date    PREGTESTUR Negative 01/15/2017      ABGs No results found for: PHART, PO2ART, TXW2GVQ, EQV6ODW, BEART, Q9ZBOMLK   Type & Screen (If Applicable)  No results found for: LABABO, LABRH                         BMI: Wt Readings from Last 3 Encounters:       NPO Status:                          Anesthesia Evaluation  Patient summary reviewed   history of anesthetic complications: PONV. Airway: Mallampati: III  TM distance: >3 FB   Neck ROM: full  Mouth opening: > = 3 FB Dental:          Pulmonary:   (+) sleep apnea:  asthma:                            Cardiovascular:        (-) valvular problems/murmurs, past MI, CAD, CABG/stent and dysrhythmias                Neuro/Psych:   (+) neuromuscular disease:, headaches:, psychiatric history:depression/anxiety    (-) TIA and CVA           GI/Hepatic/Renal:   (+) morbid obesity     (-) liver disease, no renal disease and bowel prep      ROS comment: IBS. Endo/Other:    (+) blood dyscrasia: anemia:., no malignancy/cancer. (-) diabetes mellitus, hypothyroidism, hyperthyroidism, no electrolyte abnormalities, no malignancy/cancer               Abdominal:   (+) obese,         Vascular:     - PVD, DVT and PE. Anesthesia Plan      MAC     ASA 3       Induction: intravenous.     MIPS: Prophylactic antiemetics administered. Anesthetic plan and risks discussed with patient. Plan discussed with CRNA. Attending anesthesiologist reviewed and agrees with Pre Eval content              This pre-anesthesia assessment may be used as a history and physical.    DOS STAFF ADDENDUM:    Pt seen and examined, chart reviewed (including anesthesia, drug and allergy history). No interval changes to history and physical examination. Anesthetic plan, risks, benefits, alternatives, and personnel involved discussed with patient. Patient verbalized an understanding and agrees to proceed.       Fouzia Whyte MD  August 29, 2019  6:29 AM

## 2019-08-29 NOTE — BRIEF OP NOTE
Brief Postoperative Note    Name           : Tevin Beckford  YOB: 1990  MRN             : [id number]    Pre-operative Diagnosis: 1. Right breast abscess    Post-operative Diagnosis: Same    Procedure: 1. Incision and drainage/debridement of right breast abscess    Anesthesia: MAC, 30 cc 1% lidocaine    Surgeons/Assistants: Kendall    Estimated Blood Loss: less than 50     Complications: None    Specimens: 1. Pus for culture  2. Tissue for culture  3.  Right breast tissue     Findings: same as post op    Electronically signed by Cathi Lancaster MD on 8/29/2019 at 7:54 AM

## 2019-08-29 NOTE — OP NOTE
breast until all areas of loculation and  chronic abscess seemed to be evacuated. The breast was irrigated with a  total of a liter and half of saline solution. Area of necrotic tissue  was debrided and sent to Pathology for permanent section. The wound was  then packed with half-inch iodoform gauze. Dry sterile gauze and ABD  pad were applied. The patient was returned to recovery in good  condition. I was present for the entire procedure.         Ariana Moody MD    D: 08/29/2019 9:09:42       T: 08/29/2019 9:17:28     /[email or web address]  Job#: [id number]     Doc#: [id number]    CC:  MD Ximena Mg DO

## 2019-09-03 LAB
ANAEROBIC CULTURE: NORMAL
CULTURE SURGICAL: NORMAL

## 2019-09-04 LAB
ANAEROBIC CULTURE: ABNORMAL
GRAM STAIN RESULT: ABNORMAL
WOUND/ABSCESS: ABNORMAL

## 2019-09-08 DIAGNOSIS — F41.9 ANXIETY: ICD-10-CM

## 2019-09-10 RX ORDER — LORAZEPAM 1 MG/1
TABLET ORAL
Qty: 30 TABLET | Refills: 0 | Status: SHIPPED | OUTPATIENT
Start: 2019-09-10 | End: 2019-10-09

## 2019-09-14 ENCOUNTER — APPOINTMENT (OUTPATIENT)
Dept: CT IMAGING | Age: 29
End: 2019-09-14
Payer: COMMERCIAL

## 2019-09-14 ENCOUNTER — APPOINTMENT (OUTPATIENT)
Dept: ULTRASOUND IMAGING | Age: 29
End: 2019-09-14
Payer: COMMERCIAL

## 2019-09-14 ENCOUNTER — NURSE TRIAGE (OUTPATIENT)
Dept: OTHER | Facility: CLINIC | Age: 29
End: 2019-09-14

## 2019-09-14 ENCOUNTER — HOSPITAL ENCOUNTER (EMERGENCY)
Age: 29
Discharge: HOME | End: 2019-09-15
Attending: EMERGENCY MEDICINE
Payer: COMMERCIAL

## 2019-09-14 VITALS
HEIGHT: 67 IN | WEIGHT: 270 LBS | TEMPERATURE: 98.5 F | HEART RATE: 69 BPM | RESPIRATION RATE: 16 BRPM | DIASTOLIC BLOOD PRESSURE: 59 MMHG | SYSTOLIC BLOOD PRESSURE: 110 MMHG | OXYGEN SATURATION: 100 % | BODY MASS INDEX: 42.38 KG/M2

## 2019-09-14 DIAGNOSIS — R10.32 ABDOMINAL PAIN, LEFT LOWER QUADRANT: ICD-10-CM

## 2019-09-14 DIAGNOSIS — N83.209 HEMORRHAGIC OVARIAN CYST: ICD-10-CM

## 2019-09-14 DIAGNOSIS — N28.9 LESION OF LEFT NATIVE KIDNEY: ICD-10-CM

## 2019-09-14 DIAGNOSIS — N30.00 ACUTE CYSTITIS WITHOUT HEMATURIA: Primary | ICD-10-CM

## 2019-09-14 LAB
A/G RATIO: 1.2 (ref 1.1–2.2)
ALBUMIN SERPL-MCNC: 3.8 G/DL (ref 3.4–5)
ALP BLD-CCNC: 96 U/L (ref 40–129)
ALT SERPL-CCNC: 20 U/L (ref 10–40)
ANION GAP SERPL CALCULATED.3IONS-SCNC: 9 MMOL/L (ref 3–16)
AST SERPL-CCNC: 36 U/L (ref 15–37)
BACTERIA: ABNORMAL /HPF
BASOPHILS ABSOLUTE: 0.1 K/UL (ref 0–0.2)
BASOPHILS RELATIVE PERCENT: 1.1 %
BILIRUB SERPL-MCNC: 0.3 MG/DL (ref 0–1)
BILIRUBIN URINE: NEGATIVE
BLOOD, URINE: NEGATIVE
BUN BLDV-MCNC: 10 MG/DL (ref 7–20)
CALCIUM SERPL-MCNC: 8.3 MG/DL (ref 8.3–10.6)
CHLORIDE BLD-SCNC: 103 MMOL/L (ref 99–110)
CLARITY: CLEAR
CO2: 26 MMOL/L (ref 21–32)
COLOR: YELLOW
CREAT SERPL-MCNC: 0.6 MG/DL (ref 0.6–1.1)
EOSINOPHILS ABSOLUTE: 0.3 K/UL (ref 0–0.6)
EOSINOPHILS RELATIVE PERCENT: 3 %
EPITHELIAL CELLS, UA: ABNORMAL /HPF
GFR AFRICAN AMERICAN: >60
GFR NON-AFRICAN AMERICAN: >60
GLOBULIN: 3.3 G/DL
GLUCOSE BLD-MCNC: 120 MG/DL (ref 70–99)
GLUCOSE URINE: NEGATIVE MG/DL
HCG(URINE) PREGNANCY TEST: NEGATIVE
HCT VFR BLD CALC: 35.1 % (ref 36–48)
HEMOGLOBIN: 11.8 G/DL (ref 12–16)
KETONES, URINE: NEGATIVE MG/DL
LEUKOCYTE ESTERASE, URINE: ABNORMAL
LYMPHOCYTES ABSOLUTE: 2.1 K/UL (ref 1–5.1)
LYMPHOCYTES RELATIVE PERCENT: 24.4 %
MCH RBC QN AUTO: 27.3 PG (ref 26–34)
MCHC RBC AUTO-ENTMCNC: 33.6 G/DL (ref 31–36)
MCV RBC AUTO: 81.3 FL (ref 80–100)
MICROSCOPIC EXAMINATION: YES
MONOCYTES ABSOLUTE: 0.4 K/UL (ref 0–1.3)
MONOCYTES RELATIVE PERCENT: 4.8 %
NEUTROPHILS ABSOLUTE: 5.8 K/UL (ref 1.7–7.7)
NEUTROPHILS RELATIVE PERCENT: 66.7 %
NITRITE, URINE: NEGATIVE
PDW BLD-RTO: 14.5 % (ref 12.4–15.4)
PH UA: 8.5 (ref 5–8)
PLATELET # BLD: 291 K/UL (ref 135–450)
PMV BLD AUTO: 8.2 FL (ref 5–10.5)
POTASSIUM REFLEX MAGNESIUM: 5.2 MMOL/L (ref 3.5–5.1)
PROTEIN UA: 30 MG/DL
RBC # BLD: 4.32 M/UL (ref 4–5.2)
RBC UA: ABNORMAL /HPF (ref 0–2)
SODIUM BLD-SCNC: 138 MMOL/L (ref 136–145)
SPECIFIC GRAVITY UA: 1.01 (ref 1–1.03)
TOTAL PROTEIN: 7.1 G/DL (ref 6.4–8.2)
URINE TYPE: ABNORMAL
UROBILINOGEN, URINE: 1 E.U./DL
WBC # BLD: 8.6 K/UL (ref 4–11)
WBC UA: ABNORMAL /HPF (ref 0–5)

## 2019-09-14 PROCEDURE — 76830 TRANSVAGINAL US NON-OB: CPT

## 2019-09-14 PROCEDURE — 96365 THER/PROPH/DIAG IV INF INIT: CPT

## 2019-09-14 PROCEDURE — 99284 EMERGENCY DEPT VISIT MOD MDM: CPT

## 2019-09-14 PROCEDURE — 76775 US EXAM ABDO BACK WALL LIM: CPT

## 2019-09-14 PROCEDURE — 80053 COMPREHEN METABOLIC PANEL: CPT

## 2019-09-14 PROCEDURE — 85025 COMPLETE CBC W/AUTO DIFF WBC: CPT

## 2019-09-14 PROCEDURE — 96375 TX/PRO/DX INJ NEW DRUG ADDON: CPT

## 2019-09-14 PROCEDURE — 2580000003 HC RX 258: Performed by: EMERGENCY MEDICINE

## 2019-09-14 PROCEDURE — 84703 CHORIONIC GONADOTROPIN ASSAY: CPT

## 2019-09-14 PROCEDURE — 6370000000 HC RX 637 (ALT 250 FOR IP): Performed by: EMERGENCY MEDICINE

## 2019-09-14 PROCEDURE — 74176 CT ABD & PELVIS W/O CONTRAST: CPT

## 2019-09-14 PROCEDURE — 81001 URINALYSIS AUTO W/SCOPE: CPT

## 2019-09-14 PROCEDURE — 6360000002 HC RX W HCPCS: Performed by: EMERGENCY MEDICINE

## 2019-09-14 RX ORDER — MORPHINE SULFATE 4 MG/ML
4 INJECTION, SOLUTION INTRAMUSCULAR; INTRAVENOUS ONCE
Status: COMPLETED | OUTPATIENT
Start: 2019-09-14 | End: 2019-09-14

## 2019-09-14 RX ORDER — CIPROFLOXACIN 2 MG/ML
400 INJECTION, SOLUTION INTRAVENOUS ONCE
Status: COMPLETED | OUTPATIENT
Start: 2019-09-14 | End: 2019-09-14

## 2019-09-14 RX ORDER — KETOROLAC TROMETHAMINE 30 MG/ML
15 INJECTION, SOLUTION INTRAMUSCULAR; INTRAVENOUS ONCE
Status: COMPLETED | OUTPATIENT
Start: 2019-09-14 | End: 2019-09-14

## 2019-09-14 RX ORDER — 0.9 % SODIUM CHLORIDE 0.9 %
1000 INTRAVENOUS SOLUTION INTRAVENOUS ONCE
Status: COMPLETED | OUTPATIENT
Start: 2019-09-14 | End: 2019-09-14

## 2019-09-14 RX ORDER — ONDANSETRON 4 MG/1
4 TABLET, ORALLY DISINTEGRATING ORAL ONCE
Status: COMPLETED | OUTPATIENT
Start: 2019-09-14 | End: 2019-09-14

## 2019-09-14 RX ADMIN — CIPROFLOXACIN 400 MG: 2 INJECTION, SOLUTION INTRAVENOUS at 22:33

## 2019-09-14 RX ADMIN — MORPHINE SULFATE 4 MG: 4 INJECTION, SOLUTION INTRAMUSCULAR; INTRAVENOUS at 22:33

## 2019-09-14 RX ADMIN — KETOROLAC TROMETHAMINE 15 MG: 30 INJECTION, SOLUTION INTRAMUSCULAR at 19:48

## 2019-09-14 RX ADMIN — SODIUM CHLORIDE 1000 ML: 9 INJECTION, SOLUTION INTRAVENOUS at 22:37

## 2019-09-14 RX ADMIN — ONDANSETRON 4 MG: 4 TABLET, ORALLY DISINTEGRATING ORAL at 19:48

## 2019-09-14 RX ADMIN — SODIUM CHLORIDE 1000 ML: 9 INJECTION, SOLUTION INTRAVENOUS at 19:48

## 2019-09-14 ASSESSMENT — PAIN SCALES - GENERAL
PAINLEVEL_OUTOF10: 8
PAINLEVEL_OUTOF10: 7
PAINLEVEL_OUTOF10: 7
PAINLEVEL_OUTOF10: 8
PAINLEVEL_OUTOF10: 4

## 2019-09-14 ASSESSMENT — PAIN DESCRIPTION - ORIENTATION
ORIENTATION: LEFT

## 2019-09-14 ASSESSMENT — ENCOUNTER SYMPTOMS
DIARRHEA: 0
RHINORRHEA: 0
COUGH: 0
BACK PAIN: 0
ABDOMINAL DISTENTION: 0
VOMITING: 0
WHEEZING: 0
ABDOMINAL PAIN: 1
SHORTNESS OF BREATH: 0
NAUSEA: 0
PHOTOPHOBIA: 0

## 2019-09-14 ASSESSMENT — PAIN DESCRIPTION - LOCATION
LOCATION: FLANK
LOCATION: ABDOMEN;BACK
LOCATION: ABDOMEN;BACK

## 2019-09-14 ASSESSMENT — PAIN DESCRIPTION - PAIN TYPE
TYPE: ACUTE PAIN

## 2019-09-14 NOTE — ED PROVIDER NOTES
Prior to Admission medications    Medication Sig Start Date End Date Taking? Authorizing Provider   LORazepam (ATIVAN) 1 MG tablet 1 daily as needed anxiety. 9/10/19 10/9/19 Yes Marc Verdin DO   gabapentin (NEURONTIN) 600 MG tablet 1 tid  Patient taking differently: Take 600 mg by mouth 3 times daily as needed. 1 tid 8/14/19 9/14/19 Yes Marc Verdin DO       Social history:  reports that she has never smoked. She has never used smokeless tobacco. She reports that she does not drink alcohol or use drugs. Family history:    Family History   Problem Relation Age of Onset    Cancer Mother         lung, cervical    High Blood Pressure Mother    [employer] Asthma Mother     Other Mother         chf,MI at 27    Depression Father     Diabetes Father     Bipolar Disorder Father     Heart Disease Father         CABG at 59    Other Sister         DVT during pregnancy    Other Maternal Aunt         DVT/PE    Lupus Maternal Aunt     Diabetes Paternal Grandmother          ROS  Review of Systems   Constitutional: Negative for chills and fever. HENT: Negative for congestion and rhinorrhea. Eyes: Negative for photophobia and visual disturbance. Respiratory: Negative for cough, shortness of breath and wheezing. Cardiovascular: Negative for chest pain and palpitations. Gastrointestinal: Positive for abdominal pain. Negative for abdominal distention, diarrhea, nausea and vomiting. Genitourinary: Positive for dysuria and flank pain. Negative for hematuria. Musculoskeletal: Negative for back pain and neck pain. Skin: Negative for rash and wound. Neurological: Negative for syncope and weakness. Psychiatric/Behavioral: Negative for agitation and confusion.          Exam  ED Triage Vitals [09/14/19 1837]   BP Temp Temp Source Pulse Resp SpO2 Height Weight   127/84 98.5 °F (36.9 °C) Oral 87 20 98 % 5' 7\" (1.702 m) 270 lb (122.5 kg)       Physical Exam   Constitutional: She is oriented to person, place, and time. She appears well-developed. No distress. HENT:   Head: Normocephalic and atraumatic. Eyes: Pupils are equal, round, and reactive to light. EOM are normal.   Neck: Normal range of motion. Neck supple. Cardiovascular: Normal rate and regular rhythm. No murmur heard. Pulmonary/Chest: Effort normal and breath sounds normal.   Abdominal: Soft. She exhibits no distension. There is no tenderness. No significant CVA tenderness on exam.  Abdomen is soft nontender nondistended. No guarding or rebound. Musculoskeletal: Normal range of motion. She exhibits no deformity. Neurological: She is alert and oriented to person, place, and time. She exhibits normal muscle tone. Skin: Skin is warm. No rash noted. Psychiatric: She has a normal mood and affect. Her behavior is normal.   Nursing note and vitals reviewed.         ED Course    ED Medication Orders (From admission, onward)    Start Ordered     Status Ordering Provider    09/14/19 2230 09/14/19 2217  0.9 % sodium chloride bolus  ONCE      Last MAR action:  New [facility]     09/14/19 2230 09/14/19 2219  ciprofloxacin (CIPRO) IVPB 400 mg  ONCE      Last MAR action:  New Bag - by Kasandra [email or web address]    09/14/19 2215 09/14/19 2207  morphine injection 4 mg  ONCE      Last MAR action:  Given - [email or web address]    09/14/19 1945 09/14/19 1931  ondansetron (ZOFRAN-ODT) disintegrating tablet 4 mg  ONCE      Last MAR action:  Given - by Kasandra Estrada on 09/14/19 at [facility]    09/14/19 1945 09/14/19 1931  ketorolac (TORADOL) injection 15 mg  ONCE      Last MAR action:  Given - by Kasandra Estrada on 09/14/19 at [facility]    09/14/19 1930 09/14/19 1918  0.9 % sodium chloride bolus  ONCE      Last MAR action:  Stopped - by Kasandra Estrada on 09/14/19 at [address] [facility]                Radiology  Ct Abdomen Pelvis Wo lesions. No acute fractures. No soft tissue abnormalities are detected. No renal or ureteral calculi. Complex hypodense lesion within the inferior and anterior left kidney, new when compared to the previous exam, measuring up to 2.1 cm, with thick walls and septations. Given the appearance, neoplasm is a concern (despite the patient's age). Sequela of infection would also be considered, but there is not significant surrounding fat stranding to suggest acute inflammation. A follow-up nonemergent but prompt renal MRI (preferred) or CT is recommended for further evaluation. 4 cm probably benign left ovarian cyst.  Recommend follow-up pelvic ultrasound in 6-12 weeks to document resolution.          Labs  Results for orders placed or performed during the hospital encounter of 09/14/19   Urinalysis, reflex to microscopic   Result Value Ref Range    Color, UA Yellow Straw/Yellow    Clarity, UA Clear Clear    Glucose, Ur Negative Negative mg/dL    Bilirubin Urine Negative Negative    Ketones, Urine Negative Negative mg/dL    Specific Gravity, UA 1.015 1.005 - 1.030    Blood, Urine Negative Negative    pH, UA 8.5 (A) 5.0 - 8.0    Protein, UA 30 (A) Negative mg/dL    Urobilinogen, Urine 1.0 <2.0 E.U./dL    Nitrite, Urine Negative Negative    Leukocyte Esterase, Urine SMALL (A) Negative    Microscopic Examination YES     Urine Type Not Specified    Pregnancy, Urine   Result Value Ref Range    HCG(Urine) Pregnancy Test Negative Detects HCG level >20 MIU/mL   Microscopic Urinalysis   Result Value Ref Range    WBC, UA 10-20 (A) 0 - 5 /HPF    RBC, UA 0-2 0 - 2 /HPF    Epi Cells 3-5 /HPF    Bacteria, UA 1+ (A) /HPF   CBC Auto Differential   Result Value Ref Range    WBC 8.6 4.0 - 11.0 K/uL    RBC 4.32 4.00 - 5.20 M/uL    Hemoglobin 11.8 (L) 12.0 - 16.0 g/dL    Hematocrit 35.1 (L) 36.0 - 48.0 %    MCV 81.3 80.0 - 100.0 fL    MCH 27.3 26.0 - 34.0 pg    MCHC 33.6 31.0 - 36.0 g/dL    RDW 14.5 12.4 - 15.4 %    Platelets 548 446 - 450 K/uL    MPV 8.2 5.0 - 10.5 fL    Neutrophils % 66.7 %    Lymphocytes % 24.4 %    Monocytes % 4.8 %    Eosinophils % 3.0 %    Basophils % 1.1 %    Neutrophils Absolute 5.8 1.7 - 7.7 K/uL    Lymphocytes Absolute 2.1 1.0 - 5.1 K/uL    Monocytes Absolute 0.4 0.0 - 1.3 K/uL    Eosinophils Absolute 0.3 0.0 - 0.6 K/uL    Basophils Absolute 0.1 0.0 - 0.2 K/uL   Comprehensive Metabolic Panel w/ Reflex to MG   Result Value Ref Range    Sodium 138 136 - 145 mmol/L    Potassium reflex Magnesium 5.2 (H) 3.5 - 5.1 mmol/L    Chloride 103 99 - 110 mmol/L    CO2 26 21 - 32 mmol/L    Anion Gap 9 3 - 16    Glucose 120 (H) 70 - 99 mg/dL    BUN 10 7 - 20 mg/dL    CREATININE 0.6 0.6 - 1.1 mg/dL    GFR Non-African American >60 >60    GFR African American >60 >60    Calcium 8.3 8.3 - 10.6 mg/dL    Total Protein 7.1 6.4 - 8.2 g/dL    Alb 3.8 3.4 - 5.0 g/dL    Albumin/Globulin Ratio 1.2 1.1 - 2.2    Total Bilirubin 0.3 0.0 - 1.0 mg/dL    Alkaline Phosphatase 96 40 - 129 U/L    ALT 20 10 - 40 U/L    AST 36 15 - 37 U/L    Globulin 3.3 g/dL     Bedside ultrasound  Bedside ultrasound was performed by myself using curvilinear probe. The left kidney appears to have mild hydronephrosis. No evidence of hydronephrosis on the right kidney. MDM  58-year-old female with history of kidney stones and ovarian cyst who presents with progressive left lower quadrant pain worse today. On exam well-appearing no acute distress, her vitals are reassuring. On exam soft nontender abdomen no CVA tenderness. Her labs show no significant derangements however she does have evidence of urinary tract infection. She is treated with Cipro in the ED given her allergies to cephalosporins.   Given my concern for some degree of hydronephrosis on bedside ultrasound and symptoms consistent with renal colic, was concern for infected impacted stone patient was sent for CT abdomen pelvis which is negative for any ureteral stone however does show incidental findings of a

## 2019-09-15 ENCOUNTER — APPOINTMENT (OUTPATIENT)
Dept: ULTRASOUND IMAGING | Age: 29
End: 2019-09-15
Payer: COMMERCIAL

## 2019-09-15 PROCEDURE — 76856 US EXAM PELVIC COMPLETE: CPT

## 2019-09-16 ENCOUNTER — TELEPHONE (OUTPATIENT)
Dept: FAMILY MEDICINE CLINIC | Age: 29
End: 2019-09-16

## 2019-09-27 ENCOUNTER — HOSPITAL ENCOUNTER (OUTPATIENT)
Dept: CT IMAGING | Age: 29
Discharge: HOME | End: 2019-09-27
Payer: COMMERCIAL

## 2019-09-27 DIAGNOSIS — D49.519 NEOPLASM OF UNSPECIFIED BEHAVIOR OF UNSPECIFIED KIDNEY: ICD-10-CM

## 2019-09-27 PROCEDURE — 74178 CT ABD&PLV WO CNTR FLWD CNTR: CPT

## 2019-09-27 PROCEDURE — 6360000004 HC RX CONTRAST MEDICATION: Performed by: UROLOGY

## 2019-09-27 RX ADMIN — IOPAMIDOL 75 ML: 755 INJECTION, SOLUTION INTRAVENOUS at 14:16

## 2019-09-27 RX ADMIN — IOHEXOL 50 ML: 240 INJECTION, SOLUTION INTRATHECAL; INTRAVASCULAR; INTRAVENOUS; ORAL at 14:17

## 2019-10-14 DIAGNOSIS — F41.9 ANXIETY: ICD-10-CM

## 2019-10-14 RX ORDER — LORAZEPAM 1 MG/1
TABLET ORAL
Qty: 45 TABLET | Refills: 1 | Status: SHIPPED | OUTPATIENT
Start: 2019-10-14 | End: 2019-11-14

## 2019-11-04 ENCOUNTER — E-VISIT (OUTPATIENT)
Dept: FAMILY MEDICINE CLINIC | Age: 29
End: 2019-11-04
Payer: COMMERCIAL

## 2019-11-04 DIAGNOSIS — J01.90 ACUTE RHINOSINUSITIS: Primary | ICD-10-CM

## 2019-11-04 PROCEDURE — 98969 PR NONPHYSICIAN ONLINE ASSESSMENT AND MANAGEMENT: CPT | Performed by: NURSE PRACTITIONER

## 2019-11-04 RX ORDER — BENZONATATE 100 MG/1
100 CAPSULE ORAL 3 TIMES DAILY PRN
Qty: 30 CAPSULE | Refills: 0 | Status: SHIPPED | OUTPATIENT
Start: 2019-11-04 | End: 2019-11-11

## 2019-11-04 RX ORDER — AMOXICILLIN 875 MG/1
875 TABLET, COATED ORAL 2 TIMES DAILY
Qty: 14 TABLET | Refills: 0 | Status: SHIPPED | OUTPATIENT
Start: 2019-11-04 | End: 2019-11-11

## 2019-11-04 ASSESSMENT — LIFESTYLE VARIABLES: SMOKING_STATUS: NO, I'VE NEVER SMOKED

## 2019-11-08 ENCOUNTER — HOSPITAL ENCOUNTER (OUTPATIENT)
Dept: PREADMISSION TESTING | Age: 29
Discharge: HOME | End: 2019-11-12
Payer: COMMERCIAL

## 2019-11-08 LAB
ABO/RH: NORMAL
ANION GAP SERPL CALCULATED.3IONS-SCNC: 12 MMOL/L (ref 3–16)
ANTIBODY SCREEN: NORMAL
BASOPHILS ABSOLUTE: 0 K/UL (ref 0–0.2)
BASOPHILS RELATIVE PERCENT: 0.7 %
BILIRUBIN URINE: NEGATIVE
BLOOD, URINE: ABNORMAL
BUN BLDV-MCNC: 10 MG/DL (ref 7–20)
CALCIUM SERPL-MCNC: 8.7 MG/DL (ref 8.3–10.6)
CHLORIDE BLD-SCNC: 100 MMOL/L (ref 99–110)
CLARITY: CLEAR
CO2: 24 MMOL/L (ref 21–32)
COLOR: YELLOW
CREAT SERPL-MCNC: 0.6 MG/DL (ref 0.6–1.1)
EOSINOPHILS ABSOLUTE: 0.1 K/UL (ref 0–0.6)
EOSINOPHILS RELATIVE PERCENT: 2.2 %
EPITHELIAL CELLS, UA: NORMAL /HPF
GFR AFRICAN AMERICAN: >60
GFR NON-AFRICAN AMERICAN: >60
GLUCOSE BLD-MCNC: 97 MG/DL (ref 70–99)
GLUCOSE URINE: NEGATIVE MG/DL
HCG(URINE) PREGNANCY TEST: NEGATIVE
HCT VFR BLD CALC: 37.2 % (ref 36–48)
HEMOGLOBIN: 12.4 G/DL (ref 12–16)
KETONES, URINE: NEGATIVE MG/DL
LEUKOCYTE ESTERASE, URINE: NEGATIVE
LYMPHOCYTES ABSOLUTE: 1.6 K/UL (ref 1–5.1)
LYMPHOCYTES RELATIVE PERCENT: 24.1 %
MCH RBC QN AUTO: 27.3 PG (ref 26–34)
MCHC RBC AUTO-ENTMCNC: 33.4 G/DL (ref 31–36)
MCV RBC AUTO: 81.8 FL (ref 80–100)
MICROSCOPIC EXAMINATION: YES
MONOCYTES ABSOLUTE: 0.3 K/UL (ref 0–1.3)
MONOCYTES RELATIVE PERCENT: 5.2 %
NEUTROPHILS ABSOLUTE: 4.4 K/UL (ref 1.7–7.7)
NEUTROPHILS RELATIVE PERCENT: 67.8 %
NITRITE, URINE: NEGATIVE
PDW BLD-RTO: 13.7 % (ref 12.4–15.4)
PH UA: 6 (ref 5–8)
PLATELET # BLD: 337 K/UL (ref 135–450)
PMV BLD AUTO: 8.4 FL (ref 5–10.5)
POTASSIUM SERPL-SCNC: 3.7 MMOL/L (ref 3.5–5.1)
PROTEIN UA: NEGATIVE MG/DL
RBC # BLD: 4.55 M/UL (ref 4–5.2)
RBC UA: NORMAL /HPF (ref 0–2)
SODIUM BLD-SCNC: 136 MMOL/L (ref 136–145)
SPECIFIC GRAVITY UA: <=1.005 (ref 1–1.03)
URINE TYPE: ABNORMAL
UROBILINOGEN, URINE: 0.2 E.U./DL
WBC # BLD: 6.4 K/UL (ref 4–11)
WBC UA: NORMAL /HPF (ref 0–5)

## 2019-11-08 PROCEDURE — 80048 BASIC METABOLIC PNL TOTAL CA: CPT

## 2019-11-08 PROCEDURE — 87077 CULTURE AEROBIC IDENTIFY: CPT

## 2019-11-08 PROCEDURE — 86900 BLOOD TYPING SEROLOGIC ABO: CPT

## 2019-11-08 PROCEDURE — 84703 CHORIONIC GONADOTROPIN ASSAY: CPT

## 2019-11-08 PROCEDURE — 86850 RBC ANTIBODY SCREEN: CPT

## 2019-11-08 PROCEDURE — 86901 BLOOD TYPING SEROLOGIC RH(D): CPT

## 2019-11-08 PROCEDURE — 36415 COLL VENOUS BLD VENIPUNCTURE: CPT

## 2019-11-08 PROCEDURE — 85025 COMPLETE CBC W/AUTO DIFF WBC: CPT

## 2019-11-08 PROCEDURE — 87086 URINE CULTURE/COLONY COUNT: CPT

## 2019-11-08 PROCEDURE — 87186 SC STD MICRODIL/AGAR DIL: CPT

## 2019-11-08 PROCEDURE — 81001 URINALYSIS AUTO W/SCOPE: CPT

## 2019-11-10 LAB
ORGANISM: ABNORMAL
URINE CULTURE, ROUTINE: ABNORMAL
URINE CULTURE, ROUTINE: ABNORMAL

## 2019-11-14 NOTE — PROGRESS NOTES
Obstructive Sleep Apnea (KASSY) Screening     Patient:  Susan Ceron    YOB: 1990      Medical Record #:  [id number]                     Date:  11/14/2019     1. Are you a loud and/or regular snorer? []  Yes       [] No    2. Have you been observed to gasp or stop breathing during sleep? []  Yes       [x] No    3. Do you feel tired or groggy upon awakening or do you awaken with a headache?           []  Yes       [x] No    4. Are you often tired or fatigued during the wake time hours? []  Yes       [x] No    5. Do you fall asleep sitting, reading, watching TV or driving? []  Yes       [x] No    6. Do you often have problems with memory or concentration? []  Yes       [x] No    **If patient's score is ? 3 they are considered high risk for KASSY. Notify the anesthesiologist of the high risk and document in focus note. Note:  If the patient's BMI is more than 35 kg m¯² , has neck circumference > 40 cm, and/or high blood pressure the risk is greater (© American Sleep Apnea Association, 2006).

## 2019-11-15 ENCOUNTER — OFFICE VISIT (OUTPATIENT)
Dept: FAMILY MEDICINE CLINIC | Age: 29
End: 2019-11-15
Payer: COMMERCIAL

## 2019-11-15 VITALS
WEIGHT: 272.6 LBS | HEART RATE: 82 BPM | TEMPERATURE: 97.6 F | SYSTOLIC BLOOD PRESSURE: 102 MMHG | OXYGEN SATURATION: 99 % | BODY MASS INDEX: 42.79 KG/M2 | HEIGHT: 67 IN | DIASTOLIC BLOOD PRESSURE: 72 MMHG

## 2019-11-15 DIAGNOSIS — Z01.818 PRE-OP EXAMINATION: Primary | ICD-10-CM

## 2019-11-15 PROCEDURE — 99241 PR OFFICE CONSULTATION NEW/ESTAB PATIENT 15 MIN: CPT | Performed by: FAMILY MEDICINE

## 2019-11-19 ENCOUNTER — ANESTHESIA EVENT (OUTPATIENT)
Dept: OPERATING ROOM | Age: 29
DRG: 657 | End: 2019-11-19
Payer: COMMERCIAL

## 2019-11-19 ENCOUNTER — HOSPITAL ENCOUNTER (INPATIENT)
Age: 29
LOS: 3 days | Discharge: HOME | DRG: 657 | End: 2019-11-22
Attending: UROLOGY | Admitting: UROLOGY
Payer: COMMERCIAL

## 2019-11-19 ENCOUNTER — ANESTHESIA (OUTPATIENT)
Dept: OPERATING ROOM | Age: 29
DRG: 657 | End: 2019-11-19
Payer: COMMERCIAL

## 2019-11-19 VITALS
RESPIRATION RATE: 15 BRPM | SYSTOLIC BLOOD PRESSURE: 117 MMHG | DIASTOLIC BLOOD PRESSURE: 62 MMHG | OXYGEN SATURATION: 100 % | TEMPERATURE: 97.4 F

## 2019-11-19 PROBLEM — N28.89 LEFT RENAL MASS: Status: ACTIVE | Noted: 2019-11-19

## 2019-11-19 LAB
ABO/RH: NORMAL
ANTIBODY SCREEN: NORMAL
PREGNANCY, URINE: NEGATIVE

## 2019-11-19 PROCEDURE — 2580000003 HC RX 258: Performed by: UROLOGY

## 2019-11-19 PROCEDURE — 86900 BLOOD TYPING SEROLOGIC ABO: CPT

## 2019-11-19 PROCEDURE — 2720000010 HC SURG SUPPLY STERILE: Performed by: UROLOGY

## 2019-11-19 PROCEDURE — 84703 CHORIONIC GONADOTROPIN ASSAY: CPT

## 2019-11-19 PROCEDURE — 94761 N-INVAS EAR/PLS OXIMETRY MLT: CPT

## 2019-11-19 PROCEDURE — 7100000001 HC PACU RECOVERY - ADDTL 15 MIN: Performed by: UROLOGY

## 2019-11-19 PROCEDURE — 6360000002 HC RX W HCPCS

## 2019-11-19 PROCEDURE — 6360000002 HC RX W HCPCS: Performed by: UROLOGY

## 2019-11-19 PROCEDURE — 0TB14ZZ EXCISION OF LEFT KIDNEY, PERCUTANEOUS ENDOSCOPIC APPROACH: ICD-10-PCS | Performed by: UROLOGY

## 2019-11-19 PROCEDURE — 1200000000 HC SEMI PRIVATE

## 2019-11-19 PROCEDURE — 3600000019 HC SURGERY ROBOT ADDTL 15MIN: Performed by: UROLOGY

## 2019-11-19 PROCEDURE — 6360000002 HC RX W HCPCS: Performed by: ANESTHESIOLOGY

## 2019-11-19 PROCEDURE — S2900 ROBOTIC SURGICAL SYSTEM: HCPCS | Performed by: UROLOGY

## 2019-11-19 PROCEDURE — 3700000000 HC ANESTHESIA ATTENDED CARE: Performed by: UROLOGY

## 2019-11-19 PROCEDURE — 2500000003 HC RX 250 WO HCPCS: Performed by: UROLOGY

## 2019-11-19 PROCEDURE — 86901 BLOOD TYPING SEROLOGIC RH(D): CPT

## 2019-11-19 PROCEDURE — 2580000003 HC RX 258: Performed by: NURSE ANESTHETIST, CERTIFIED REGISTERED

## 2019-11-19 PROCEDURE — 2500000003 HC RX 250 WO HCPCS: Performed by: ANESTHESIOLOGY

## 2019-11-19 PROCEDURE — 2780000010 HC IMPLANT OTHER: Performed by: UROLOGY

## 2019-11-19 PROCEDURE — 88307 TISSUE EXAM BY PATHOLOGIST: CPT

## 2019-11-19 PROCEDURE — 2700000000 HC OXYGEN THERAPY PER DAY

## 2019-11-19 PROCEDURE — 2709999900 HC NON-CHARGEABLE SUPPLY: Performed by: UROLOGY

## 2019-11-19 PROCEDURE — 3600000009 HC SURGERY ROBOT BASE: Performed by: UROLOGY

## 2019-11-19 PROCEDURE — 6360000002 HC RX W HCPCS: Performed by: NURSE ANESTHETIST, CERTIFIED REGISTERED

## 2019-11-19 PROCEDURE — 88341 IMHCHEM/IMCYTCHM EA ADD ANTB: CPT

## 2019-11-19 PROCEDURE — 86850 RBC ANTIBODY SCREEN: CPT

## 2019-11-19 PROCEDURE — 7100000000 HC PACU RECOVERY - FIRST 15 MIN: Performed by: UROLOGY

## 2019-11-19 PROCEDURE — 8E0W4CZ ROBOTIC ASSISTED PROCEDURE OF TRUNK REGION, PERCUTANEOUS ENDOSCOPIC APPROACH: ICD-10-PCS | Performed by: UROLOGY

## 2019-11-19 PROCEDURE — 3700000001 HC ADD 15 MINUTES (ANESTHESIA): Performed by: UROLOGY

## 2019-11-19 PROCEDURE — 6370000000 HC RX 637 (ALT 250 FOR IP): Performed by: UROLOGY

## 2019-11-19 PROCEDURE — 88342 IMHCHEM/IMCYTCHM 1ST ANTB: CPT

## 2019-11-19 PROCEDURE — 2500000003 HC RX 250 WO HCPCS: Performed by: NURSE ANESTHETIST, CERTIFIED REGISTERED

## 2019-11-19 DEVICE — Z DUP USE 2641840 CLIP INT L POLYMER LOK LIG HEM O LOK: Type: IMPLANTABLE DEVICE | Site: KIDNEY | Status: FUNCTIONAL

## 2019-11-19 RX ORDER — MIDAZOLAM HYDROCHLORIDE 1 MG/ML
INJECTION INTRAMUSCULAR; INTRAVENOUS
Status: COMPLETED
Start: 2019-11-19 | End: 2019-11-19

## 2019-11-19 RX ORDER — SODIUM CHLORIDE, SODIUM LACTATE, POTASSIUM CHLORIDE, CALCIUM CHLORIDE 600; 310; 30; 20 MG/100ML; MG/100ML; MG/100ML; MG/100ML
INJECTION, SOLUTION INTRAVENOUS CONTINUOUS PRN
Status: DISCONTINUED | OUTPATIENT
Start: 2019-11-19 | End: 2019-11-19 | Stop reason: SDUPTHER

## 2019-11-19 RX ORDER — EPHEDRINE SULFATE 50 MG/ML
INJECTION INTRAVENOUS PRN
Status: DISCONTINUED | OUTPATIENT
Start: 2019-11-19 | End: 2019-11-19 | Stop reason: SDUPTHER

## 2019-11-19 RX ORDER — LIDOCAINE HYDROCHLORIDE 20 MG/ML
INJECTION, SOLUTION INFILTRATION; PERINEURAL PRN
Status: DISCONTINUED | OUTPATIENT
Start: 2019-11-19 | End: 2019-11-19 | Stop reason: SDUPTHER

## 2019-11-19 RX ORDER — CIPROFLOXACIN 2 MG/ML
400 INJECTION, SOLUTION INTRAVENOUS EVERY 12 HOURS
Status: COMPLETED | OUTPATIENT
Start: 2019-11-19 | End: 2019-11-20

## 2019-11-19 RX ORDER — OXYCODONE HYDROCHLORIDE AND ACETAMINOPHEN 5; 325 MG/1; MG/1
2 TABLET ORAL PRN
Status: DISCONTINUED | OUTPATIENT
Start: 2019-11-19 | End: 2019-11-19 | Stop reason: HOSPADM

## 2019-11-19 RX ORDER — ONDANSETRON 2 MG/ML
INJECTION INTRAMUSCULAR; INTRAVENOUS PRN
Status: DISCONTINUED | OUTPATIENT
Start: 2019-11-19 | End: 2019-11-19 | Stop reason: SDUPTHER

## 2019-11-19 RX ORDER — MORPHINE SULFATE 2 MG/ML
1 INJECTION, SOLUTION INTRAMUSCULAR; INTRAVENOUS EVERY 5 MIN PRN
Status: DISCONTINUED | OUTPATIENT
Start: 2019-11-19 | End: 2019-11-19 | Stop reason: HOSPADM

## 2019-11-19 RX ORDER — ROCURONIUM BROMIDE 10 MG/ML
INJECTION, SOLUTION INTRAVENOUS PRN
Status: DISCONTINUED | OUTPATIENT
Start: 2019-11-19 | End: 2019-11-19 | Stop reason: SDUPTHER

## 2019-11-19 RX ORDER — OXYCODONE HYDROCHLORIDE AND ACETAMINOPHEN 5; 325 MG/1; MG/1
1 TABLET ORAL PRN
Status: DISCONTINUED | OUTPATIENT
Start: 2019-11-19 | End: 2019-11-19 | Stop reason: HOSPADM

## 2019-11-19 RX ORDER — APREPITANT 40 MG/1
40 CAPSULE ORAL ONCE
Status: COMPLETED | OUTPATIENT
Start: 2019-11-19 | End: 2019-11-19

## 2019-11-19 RX ORDER — MIDAZOLAM HYDROCHLORIDE 1 MG/ML
INJECTION INTRAMUSCULAR; INTRAVENOUS PRN
Status: DISCONTINUED | OUTPATIENT
Start: 2019-11-19 | End: 2019-11-19 | Stop reason: SDUPTHER

## 2019-11-19 RX ORDER — ONDANSETRON 2 MG/ML
4 INJECTION INTRAMUSCULAR; INTRAVENOUS EVERY 6 HOURS PRN
Status: DISCONTINUED | OUTPATIENT
Start: 2019-11-19 | End: 2019-11-22 | Stop reason: HOSPADM

## 2019-11-19 RX ORDER — FENTANYL CITRATE 50 UG/ML
INJECTION, SOLUTION INTRAMUSCULAR; INTRAVENOUS PRN
Status: DISCONTINUED | OUTPATIENT
Start: 2019-11-19 | End: 2019-11-19 | Stop reason: SDUPTHER

## 2019-11-19 RX ORDER — OXYCODONE HYDROCHLORIDE 5 MG/1
5 TABLET ORAL EVERY 4 HOURS PRN
Status: DISCONTINUED | OUTPATIENT
Start: 2019-11-19 | End: 2019-11-22 | Stop reason: HOSPADM

## 2019-11-19 RX ORDER — LIDOCAINE 4 G/G
1 PATCH TOPICAL EVERY 12 HOURS
Status: DISCONTINUED | OUTPATIENT
Start: 2019-11-19 | End: 2019-11-22 | Stop reason: HOSPADM

## 2019-11-19 RX ORDER — MANNITOL 250 MG/ML
INJECTION, SOLUTION INTRAVENOUS PRN
Status: DISCONTINUED | OUTPATIENT
Start: 2019-11-19 | End: 2019-11-19 | Stop reason: SDUPTHER

## 2019-11-19 RX ORDER — DEXTROSE, SODIUM CHLORIDE, AND POTASSIUM CHLORIDE 5; .45; .15 G/100ML; G/100ML; G/100ML
INJECTION INTRAVENOUS CONTINUOUS
Status: DISCONTINUED | OUTPATIENT
Start: 2019-11-19 | End: 2019-11-21

## 2019-11-19 RX ORDER — SODIUM CHLORIDE 0.9 % (FLUSH) 0.9 %
10 SYRINGE (ML) INJECTION PRN
Status: DISCONTINUED | OUTPATIENT
Start: 2019-11-19 | End: 2019-11-22 | Stop reason: HOSPADM

## 2019-11-19 RX ORDER — MORPHINE SULFATE 2 MG/ML
2 INJECTION, SOLUTION INTRAMUSCULAR; INTRAVENOUS EVERY 5 MIN PRN
Status: DISCONTINUED | OUTPATIENT
Start: 2019-11-19 | End: 2019-11-19 | Stop reason: HOSPADM

## 2019-11-19 RX ORDER — HYDROMORPHONE HCL 110MG/55ML
PATIENT CONTROLLED ANALGESIA SYRINGE INTRAVENOUS PRN
Status: DISCONTINUED | OUTPATIENT
Start: 2019-11-19 | End: 2019-11-19 | Stop reason: SDUPTHER

## 2019-11-19 RX ORDER — BUPIVACAINE HYDROCHLORIDE 5 MG/ML
INJECTION, SOLUTION EPIDURAL; INTRACAUDAL PRN
Status: DISCONTINUED | OUTPATIENT
Start: 2019-11-19 | End: 2019-11-19 | Stop reason: HOSPADM

## 2019-11-19 RX ORDER — HYDROMORPHONE HCL 110MG/55ML
0.5 PATIENT CONTROLLED ANALGESIA SYRINGE INTRAVENOUS
Status: DISCONTINUED | OUTPATIENT
Start: 2019-11-19 | End: 2019-11-22 | Stop reason: HOSPADM

## 2019-11-19 RX ORDER — SENNA AND DOCUSATE SODIUM 50; 8.6 MG/1; MG/1
1 TABLET, FILM COATED ORAL 2 TIMES DAILY
Status: DISCONTINUED | OUTPATIENT
Start: 2019-11-19 | End: 2019-11-22 | Stop reason: HOSPADM

## 2019-11-19 RX ORDER — SODIUM CHLORIDE, SODIUM LACTATE, POTASSIUM CHLORIDE, CALCIUM CHLORIDE 600; 310; 30; 20 MG/100ML; MG/100ML; MG/100ML; MG/100ML
INJECTION, SOLUTION INTRAVENOUS CONTINUOUS
Status: DISCONTINUED | OUTPATIENT
Start: 2019-11-19 | End: 2019-11-19

## 2019-11-19 RX ORDER — DEXAMETHASONE SODIUM PHOSPHATE 4 MG/ML
INJECTION, SOLUTION INTRA-ARTICULAR; INTRALESIONAL; INTRAMUSCULAR; INTRAVENOUS; SOFT TISSUE PRN
Status: DISCONTINUED | OUTPATIENT
Start: 2019-11-19 | End: 2019-11-19 | Stop reason: SDUPTHER

## 2019-11-19 RX ORDER — SODIUM CHLORIDE 0.9 % (FLUSH) 0.9 %
10 SYRINGE (ML) INJECTION EVERY 12 HOURS SCHEDULED
Status: DISCONTINUED | OUTPATIENT
Start: 2019-11-19 | End: 2019-11-19 | Stop reason: HOSPADM

## 2019-11-19 RX ORDER — SODIUM CHLORIDE 0.9 % (FLUSH) 0.9 %
10 SYRINGE (ML) INJECTION PRN
Status: DISCONTINUED | OUTPATIENT
Start: 2019-11-19 | End: 2019-11-19 | Stop reason: HOSPADM

## 2019-11-19 RX ORDER — LORAZEPAM 1 MG/1
1 TABLET ORAL EVERY 6 HOURS PRN
Status: DISCONTINUED | OUTPATIENT
Start: 2019-11-19 | End: 2019-11-22 | Stop reason: HOSPADM

## 2019-11-19 RX ORDER — ONDANSETRON 2 MG/ML
4 INJECTION INTRAMUSCULAR; INTRAVENOUS
Status: DISCONTINUED | OUTPATIENT
Start: 2019-11-19 | End: 2019-11-19 | Stop reason: HOSPADM

## 2019-11-19 RX ORDER — PROPOFOL 10 MG/ML
INJECTION, EMULSION INTRAVENOUS PRN
Status: DISCONTINUED | OUTPATIENT
Start: 2019-11-19 | End: 2019-11-19 | Stop reason: SDUPTHER

## 2019-11-19 RX ORDER — SODIUM CHLORIDE 0.9 % (FLUSH) 0.9 %
10 SYRINGE (ML) INJECTION EVERY 12 HOURS SCHEDULED
Status: DISCONTINUED | OUTPATIENT
Start: 2019-11-19 | End: 2019-11-22 | Stop reason: HOSPADM

## 2019-11-19 RX ORDER — MIDAZOLAM HYDROCHLORIDE 1 MG/ML
1 INJECTION INTRAMUSCULAR; INTRAVENOUS
Status: DISCONTINUED | OUTPATIENT
Start: 2019-11-19 | End: 2019-11-22 | Stop reason: HOSPADM

## 2019-11-19 RX ORDER — LORAZEPAM 1 MG/1
1 TABLET ORAL EVERY 6 HOURS PRN
COMMUNITY
End: 2020-01-20 | Stop reason: SDUPTHER

## 2019-11-19 RX ORDER — GABAPENTIN 300 MG/1
600 CAPSULE ORAL 2 TIMES DAILY
Status: DISCONTINUED | OUTPATIENT
Start: 2019-11-19 | End: 2019-11-22 | Stop reason: HOSPADM

## 2019-11-19 RX ORDER — NALOXONE HYDROCHLORIDE 0.4 MG/ML
0.4 INJECTION, SOLUTION INTRAMUSCULAR; INTRAVENOUS; SUBCUTANEOUS PRN
Status: DISCONTINUED | OUTPATIENT
Start: 2019-11-19 | End: 2019-11-19

## 2019-11-19 RX ORDER — LIDOCAINE HYDROCHLORIDE 10 MG/ML
0.3 INJECTION, SOLUTION EPIDURAL; INFILTRATION; INTRACAUDAL; PERINEURAL
Status: DISCONTINUED | OUTPATIENT
Start: 2019-11-19 | End: 2019-11-19 | Stop reason: HOSPADM

## 2019-11-19 RX ORDER — MORPHINE SULFATE/0.9% NACL/PF 1 MG/ML
SYRINGE (ML) INJECTION CONTINUOUS
Status: DISCONTINUED | OUTPATIENT
Start: 2019-11-19 | End: 2019-11-19

## 2019-11-19 RX ADMIN — CIPROFLOXACIN 400 MG: 2 INJECTION, SOLUTION INTRAVENOUS at 18:53

## 2019-11-19 RX ADMIN — HYDROMORPHONE HYDROCHLORIDE 0.5 MG: 1 INJECTION, SOLUTION INTRAMUSCULAR; INTRAVENOUS; SUBCUTANEOUS at 17:10

## 2019-11-19 RX ADMIN — FENTANYL CITRATE 100 MCG: 50 INJECTION INTRAMUSCULAR; INTRAVENOUS at 13:39

## 2019-11-19 RX ADMIN — PROPOFOL 200 MG: 10 INJECTION, EMULSION INTRAVENOUS at 13:41

## 2019-11-19 RX ADMIN — HYDROMORPHONE HYDROCHLORIDE 0.5 MG: 2 INJECTION, SOLUTION INTRAMUSCULAR; INTRAVENOUS; SUBCUTANEOUS at 22:42

## 2019-11-19 RX ADMIN — MIDAZOLAM 1 MG: 1 INJECTION INTRAMUSCULAR; INTRAVENOUS at 12:49

## 2019-11-19 RX ADMIN — FAMOTIDINE 20 MG: 10 INJECTION, SOLUTION INTRAVENOUS at 12:48

## 2019-11-19 RX ADMIN — APREPITANT 40 MG: 40 CAPSULE ORAL at 12:48

## 2019-11-19 RX ADMIN — VANCOMYCIN HYDROCHLORIDE 2000 MG: 1 INJECTION, POWDER, LYOPHILIZED, FOR SOLUTION INTRAVENOUS at 13:35

## 2019-11-19 RX ADMIN — HYDROMORPHONE HYDROCHLORIDE 0.5 MG: 1 INJECTION, SOLUTION INTRAMUSCULAR; INTRAVENOUS; SUBCUTANEOUS at 17:28

## 2019-11-19 RX ADMIN — SODIUM CHLORIDE, POTASSIUM CHLORIDE, SODIUM LACTATE AND CALCIUM CHLORIDE: 600; 310; 30; 20 INJECTION, SOLUTION INTRAVENOUS at 13:46

## 2019-11-19 RX ADMIN — MORPHINE SULFATE 30 MG: 1 INJECTION INTRAVENOUS at 18:47

## 2019-11-19 RX ADMIN — MIDAZOLAM HYDROCHLORIDE 1 MG: 1 INJECTION INTRAMUSCULAR; INTRAVENOUS at 12:49

## 2019-11-19 RX ADMIN — HYDROMORPHONE HYDROCHLORIDE 0.5 MG: 2 INJECTION INTRAMUSCULAR; INTRAVENOUS; SUBCUTANEOUS at 16:09

## 2019-11-19 RX ADMIN — SUGAMMADEX 250 MG: 100 INJECTION, SOLUTION INTRAVENOUS at 16:23

## 2019-11-19 RX ADMIN — LIDOCAINE HYDROCHLORIDE 100 MG: 20 INJECTION, SOLUTION INFILTRATION; PERINEURAL at 13:41

## 2019-11-19 RX ADMIN — POTASSIUM CHLORIDE, DEXTROSE MONOHYDRATE AND SODIUM CHLORIDE: 150; 5; 450 INJECTION, SOLUTION INTRAVENOUS at 18:56

## 2019-11-19 RX ADMIN — ROCURONIUM BROMIDE 10 MG: 10 SOLUTION INTRAVENOUS at 15:43

## 2019-11-19 RX ADMIN — ONDANSETRON 4 MG: 2 INJECTION INTRAMUSCULAR; INTRAVENOUS at 15:54

## 2019-11-19 RX ADMIN — MIDAZOLAM HYDROCHLORIDE 2 MG: 2 INJECTION, SOLUTION INTRAMUSCULAR; INTRAVENOUS at 13:35

## 2019-11-19 RX ADMIN — SENNOSIDES AND DOCUSATE SODIUM 1 TABLET: 8.6; 5 TABLET ORAL at 21:45

## 2019-11-19 RX ADMIN — ONDANSETRON 4 MG: 2 INJECTION INTRAMUSCULAR; INTRAVENOUS at 14:34

## 2019-11-19 RX ADMIN — ROCURONIUM BROMIDE 30 MG: 10 SOLUTION INTRAVENOUS at 14:36

## 2019-11-19 RX ADMIN — DEXAMETHASONE SODIUM PHOSPHATE 8 MG: 4 INJECTION, SOLUTION INTRAMUSCULAR; INTRAVENOUS at 14:02

## 2019-11-19 RX ADMIN — MANNITOL 12.5 G: 12.5 INJECTION, SOLUTION INTRAVENOUS at 15:07

## 2019-11-19 RX ADMIN — ONDANSETRON 4 MG: 2 INJECTION INTRAMUSCULAR; INTRAVENOUS at 21:44

## 2019-11-19 RX ADMIN — FENTANYL CITRATE 50 MCG: 50 INJECTION INTRAMUSCULAR; INTRAVENOUS at 15:43

## 2019-11-19 RX ADMIN — HYDROMORPHONE HYDROCHLORIDE 1 MG: 2 INJECTION INTRAMUSCULAR; INTRAVENOUS; SUBCUTANEOUS at 16:04

## 2019-11-19 RX ADMIN — EPHEDRINE SULFATE 5 MG: 50 INJECTION INTRAVENOUS at 14:23

## 2019-11-19 RX ADMIN — SODIUM CHLORIDE, POTASSIUM CHLORIDE, SODIUM LACTATE AND CALCIUM CHLORIDE: 600; 310; 30; 20 INJECTION, SOLUTION INTRAVENOUS at 13:35

## 2019-11-19 RX ADMIN — ROCURONIUM BROMIDE 50 MG: 10 SOLUTION INTRAVENOUS at 13:42

## 2019-11-19 RX ADMIN — EPHEDRINE SULFATE 5 MG: 50 INJECTION INTRAVENOUS at 14:25

## 2019-11-19 ASSESSMENT — PULMONARY FUNCTION TESTS
PIF_VALUE: 2
PIF_VALUE: 19
PIF_VALUE: 20
PIF_VALUE: 25
PIF_VALUE: 2
PIF_VALUE: 24
PIF_VALUE: 25
PIF_VALUE: 2
PIF_VALUE: 30
PIF_VALUE: 24
PIF_VALUE: 3
PIF_VALUE: 21
PIF_VALUE: 25
PIF_VALUE: 19
PIF_VALUE: 18
PIF_VALUE: 26
PIF_VALUE: 25
PIF_VALUE: 20
PIF_VALUE: 2
PIF_VALUE: 19
PIF_VALUE: 2
PIF_VALUE: 25
PIF_VALUE: 25
PIF_VALUE: 20
PIF_VALUE: 25
PIF_VALUE: 26
PIF_VALUE: 2
PIF_VALUE: 25
PIF_VALUE: 19
PIF_VALUE: 2
PIF_VALUE: 24
PIF_VALUE: 2
PIF_VALUE: 2
PIF_VALUE: 25
PIF_VALUE: 28
PIF_VALUE: 3
PIF_VALUE: 2
PIF_VALUE: 25
PIF_VALUE: 24
PIF_VALUE: 1
PIF_VALUE: 2
PIF_VALUE: 27
PIF_VALUE: 8
PIF_VALUE: 3
PIF_VALUE: 25
PIF_VALUE: 27
PIF_VALUE: 19
PIF_VALUE: 24
PIF_VALUE: 25
PIF_VALUE: 19
PIF_VALUE: 2
PIF_VALUE: 18
PIF_VALUE: 25
PIF_VALUE: 2
PIF_VALUE: 21
PIF_VALUE: 18
PIF_VALUE: 2
PIF_VALUE: 26
PIF_VALUE: 19
PIF_VALUE: 26
PIF_VALUE: 24
PIF_VALUE: 25
PIF_VALUE: 19
PIF_VALUE: 24
PIF_VALUE: 18
PIF_VALUE: 1
PIF_VALUE: 25
PIF_VALUE: 8
PIF_VALUE: 19
PIF_VALUE: 2
PIF_VALUE: 25
PIF_VALUE: 25
PIF_VALUE: 19
PIF_VALUE: 25
PIF_VALUE: 25
PIF_VALUE: 5
PIF_VALUE: 25
PIF_VALUE: 23
PIF_VALUE: 25
PIF_VALUE: 25
PIF_VALUE: 20
PIF_VALUE: 2
PIF_VALUE: 20
PIF_VALUE: 26
PIF_VALUE: 25
PIF_VALUE: 24
PIF_VALUE: 20
PIF_VALUE: 23
PIF_VALUE: 0
PIF_VALUE: 2
PIF_VALUE: 3
PIF_VALUE: 19
PIF_VALUE: 22
PIF_VALUE: 17
PIF_VALUE: 25
PIF_VALUE: 2
PIF_VALUE: 25
PIF_VALUE: 24
PIF_VALUE: 27
PIF_VALUE: 22
PIF_VALUE: 20
PIF_VALUE: 25
PIF_VALUE: 28
PIF_VALUE: 26
PIF_VALUE: 25
PIF_VALUE: 26
PIF_VALUE: 24
PIF_VALUE: 19
PIF_VALUE: 20
PIF_VALUE: 26
PIF_VALUE: 20
PIF_VALUE: 25
PIF_VALUE: 1
PIF_VALUE: 2
PIF_VALUE: 24
PIF_VALUE: 20
PIF_VALUE: 19
PIF_VALUE: 2
PIF_VALUE: 19
PIF_VALUE: 5
PIF_VALUE: 21
PIF_VALUE: 25
PIF_VALUE: 19
PIF_VALUE: 25
PIF_VALUE: 20
PIF_VALUE: 3
PIF_VALUE: 20
PIF_VALUE: 3
PIF_VALUE: 19
PIF_VALUE: 25
PIF_VALUE: 1
PIF_VALUE: 20
PIF_VALUE: 2
PIF_VALUE: 19
PIF_VALUE: 26
PIF_VALUE: 25
PIF_VALUE: 25
PIF_VALUE: 2
PIF_VALUE: 1
PIF_VALUE: 0
PIF_VALUE: 27
PIF_VALUE: 26
PIF_VALUE: 25
PIF_VALUE: 25
PIF_VALUE: 26
PIF_VALUE: 25
PIF_VALUE: 19
PIF_VALUE: 25
PIF_VALUE: 22
PIF_VALUE: 19
PIF_VALUE: 26
PIF_VALUE: 20
PIF_VALUE: 26
PIF_VALUE: 21
PIF_VALUE: 29
PIF_VALUE: 23
PIF_VALUE: 23
PIF_VALUE: 25
PIF_VALUE: 20
PIF_VALUE: 25
PIF_VALUE: 18
PIF_VALUE: 27
PIF_VALUE: 24
PIF_VALUE: 3
PIF_VALUE: 20
PIF_VALUE: 18
PIF_VALUE: 26
PIF_VALUE: 25
PIF_VALUE: 2

## 2019-11-19 ASSESSMENT — PAIN DESCRIPTION - ORIENTATION: ORIENTATION: LEFT;MID

## 2019-11-19 ASSESSMENT — PAIN DESCRIPTION - DESCRIPTORS: DESCRIPTORS: ACHING

## 2019-11-19 ASSESSMENT — PAIN SCALES - GENERAL
PAINLEVEL_OUTOF10: 6
PAINLEVEL_OUTOF10: 4
PAINLEVEL_OUTOF10: 5
PAINLEVEL_OUTOF10: 8
PAINLEVEL_OUTOF10: 0
PAINLEVEL_OUTOF10: 4

## 2019-11-19 ASSESSMENT — PAIN DESCRIPTION - FREQUENCY: FREQUENCY: OTHER (COMMENT)

## 2019-11-19 ASSESSMENT — PAIN - FUNCTIONAL ASSESSMENT: PAIN_FUNCTIONAL_ASSESSMENT: 0-10

## 2019-11-19 ASSESSMENT — PAIN DESCRIPTION - PAIN TYPE: TYPE: SURGICAL PAIN

## 2019-11-19 ASSESSMENT — LIFESTYLE VARIABLES: SMOKING_STATUS: 0

## 2019-11-19 ASSESSMENT — PAIN DESCRIPTION - PROGRESSION: CLINICAL_PROGRESSION: OTHER (COMMENT)

## 2019-11-19 ASSESSMENT — PAIN DESCRIPTION - ONSET: ONSET: OTHER (COMMENT)

## 2019-11-19 ASSESSMENT — PAIN DESCRIPTION - LOCATION: LOCATION: FLANK;ABDOMEN

## 2019-11-19 NOTE — PROGRESS NOTES
Gave bedside report to floor nurse and performed 4 eye assessment. Vitals stable and patient pain tolerable.

## 2019-11-19 NOTE — LETTER
[facility] TELE/MED SURG/ONC  [address] [facility] [address]  Phone: [phone]         November 22, 2019     Patient: Gildardo Fry   YOB: 1990   Date of Visit: 10/16/2019       To Whom It May Concern:    Fletcher Connelly was seen and treated in our emergency department on 11/19/2019. She was discharged on 11/22/2019.           Sincerely,        Juan Manuel Dee RN        Signature:__________________________________

## 2019-11-20 LAB
ANION GAP SERPL CALCULATED.3IONS-SCNC: 9 MMOL/L (ref 3–16)
BUN BLDV-MCNC: 6 MG/DL (ref 7–20)
CALCIUM SERPL-MCNC: 8.4 MG/DL (ref 8.3–10.6)
CHLORIDE BLD-SCNC: 103 MMOL/L (ref 99–110)
CO2: 25 MMOL/L (ref 21–32)
CREAT SERPL-MCNC: 0.7 MG/DL (ref 0.6–1.1)
CREATININE FLUID: 0.6 MG/DL
FLUID TYPE: NORMAL
GFR AFRICAN AMERICAN: >60
GFR NON-AFRICAN AMERICAN: >60
GLUCOSE BLD-MCNC: 172 MG/DL (ref 70–99)
HCT VFR BLD CALC: 34.4 % (ref 36–48)
HEMOGLOBIN: 11.5 G/DL (ref 12–16)
MCH RBC QN AUTO: 27.3 PG (ref 26–34)
MCHC RBC AUTO-ENTMCNC: 33.5 G/DL (ref 31–36)
MCV RBC AUTO: 81.5 FL (ref 80–100)
PDW BLD-RTO: 13.7 % (ref 12.4–15.4)
PLATELET # BLD: 270 K/UL (ref 135–450)
PMV BLD AUTO: 8.5 FL (ref 5–10.5)
POTASSIUM REFLEX MAGNESIUM: 5 MMOL/L (ref 3.5–5.1)
RBC # BLD: 4.23 M/UL (ref 4–5.2)
SODIUM BLD-SCNC: 137 MMOL/L (ref 136–145)
WBC # BLD: 10.3 K/UL (ref 4–11)

## 2019-11-20 PROCEDURE — 36415 COLL VENOUS BLD VENIPUNCTURE: CPT

## 2019-11-20 PROCEDURE — 82570 ASSAY OF URINE CREATININE: CPT

## 2019-11-20 PROCEDURE — 85027 COMPLETE CBC AUTOMATED: CPT

## 2019-11-20 PROCEDURE — 1200000000 HC SEMI PRIVATE

## 2019-11-20 PROCEDURE — 2500000003 HC RX 250 WO HCPCS: Performed by: UROLOGY

## 2019-11-20 PROCEDURE — 6370000000 HC RX 637 (ALT 250 FOR IP): Performed by: UROLOGY

## 2019-11-20 PROCEDURE — 6360000002 HC RX W HCPCS: Performed by: UROLOGY

## 2019-11-20 PROCEDURE — 6370000000 HC RX 637 (ALT 250 FOR IP): Performed by: PHYSICIAN ASSISTANT

## 2019-11-20 PROCEDURE — 80048 BASIC METABOLIC PNL TOTAL CA: CPT

## 2019-11-20 RX ORDER — ONDANSETRON 4 MG/1
4 TABLET, ORALLY DISINTEGRATING ORAL EVERY 6 HOURS PRN
Status: DISCONTINUED | OUTPATIENT
Start: 2019-11-20 | End: 2019-11-22 | Stop reason: HOSPADM

## 2019-11-20 RX ADMIN — POTASSIUM CHLORIDE, DEXTROSE MONOHYDRATE AND SODIUM CHLORIDE: 150; 5; 450 INJECTION, SOLUTION INTRAVENOUS at 06:04

## 2019-11-20 RX ADMIN — ENOXAPARIN SODIUM 40 MG: 40 INJECTION SUBCUTANEOUS at 08:19

## 2019-11-20 RX ADMIN — SENNOSIDES AND DOCUSATE SODIUM 1 TABLET: 8.6; 5 TABLET ORAL at 20:33

## 2019-11-20 RX ADMIN — OXYCODONE HYDROCHLORIDE 5 MG: 5 TABLET ORAL at 17:59

## 2019-11-20 RX ADMIN — CIPROFLOXACIN 400 MG: 2 INJECTION, SOLUTION INTRAVENOUS at 05:56

## 2019-11-20 RX ADMIN — OXYCODONE HYDROCHLORIDE 5 MG: 5 TABLET ORAL at 13:25

## 2019-11-20 RX ADMIN — ONDANSETRON 4 MG: 4 TABLET, ORALLY DISINTEGRATING ORAL at 17:59

## 2019-11-20 RX ADMIN — OXYCODONE HYDROCHLORIDE 5 MG: 5 TABLET ORAL at 05:55

## 2019-11-20 RX ADMIN — SENNOSIDES AND DOCUSATE SODIUM 1 TABLET: 8.6; 5 TABLET ORAL at 08:19

## 2019-11-20 RX ADMIN — ONDANSETRON 4 MG: 2 INJECTION INTRAMUSCULAR; INTRAVENOUS at 05:55

## 2019-11-20 RX ADMIN — POTASSIUM CHLORIDE, DEXTROSE MONOHYDRATE AND SODIUM CHLORIDE: 150; 5; 450 INJECTION, SOLUTION INTRAVENOUS at 17:59

## 2019-11-20 ASSESSMENT — PAIN SCALES - GENERAL
PAINLEVEL_OUTOF10: 7

## 2019-11-20 NOTE — OP NOTE
[facility] [place] [address]                                OPERATIVE REPORT    PATIENT NAME: Zohaib Shell                    :        1990  MED REC NO:   [id number]                          ROOM:       1808  ACCOUNT NO:   [de-identified]                           ADMIT DATE: 2019  PROVIDER:     Chilo Mendez MD    DATE OF PROCEDURE:  2019    PREOPERATIVE DIAGNOSIS:  Left renal mass. POSTOPERATIVE DIAGNOSIS:  Left renal mass. OPERATION PERFORMED:  Robotic left partial nephrectomy and whole left  renal ultrasound. SURGEON:  Chilo Mendez MD    INDICATIONS FOR PROCEDURE:  The patient is a 28-year-old female, who had  a 1.9 cm Bosniak 3 renal lesion on the left. The risks and benefits of a  robotic left partial nephrectomy were discussed with the patient. OPERATIVE PROCEDURE:  The patient had preoperative antibiotics, general  anesthesia, was in the flank position with the left side up. Her  abdomen and left flank were prepped and draped in the usual sterile  fashion. I used 0.5% Marcaine in all the incisions and I made an 8-mm  transverse incision in the left upper midline. I then used the Veress  needle to insufflate the abdomen. I looked in with a 5-mm visual trocar  and switched this out for an 8-mm robotic port. I then put an 8-mm  robotic port 7.5 cm superior to this, another 8-mm robotic port 7.5 cm  inferior to the camera port and another 8-mm robotic port 7.5 cm  inferior to this. I placed a 5-mm assistant port in the upper midline  and a 12-mm assistant port in the umbilicus. I then docked the [employer]. I then flipped the colon medially, dissected the spleen away  from the kidney and was able to find the renal vein. I found the  gonadal vein coming off of this and clipped two areas cut in between. I  then dissected around the renal vein and found the renal artery behind  this.   I

## 2019-11-20 NOTE — PROGRESS NOTES
Patient alert and oriented. Morphine pca pump discontinued and pain medication changed to dilaudid. Dilaudid caused patient to have episodes of emesis. After discussion with patient states that this has happened before. Denies need for further medication for nausea or pain. ARDEN with bloody drainage. Sweet catheter draining kendall urine. Abdominal dressings dry and intact.

## 2019-11-20 NOTE — PROGRESS NOTES
Shift assessment completed. Pt A&O, VSS.  at bedside. Sweet catheter draining clear, yellow urine. Left ARDEN drain to bulb suction draining bloody drainage. Informed pt we should get her up to the chair today, pt agreeable. Non skid socks on. Pt reporting mild pain and some nausea but does not need anything currently. Denies any needs at this time. Bed locked and in lowest position. Call light and bedside table within reach. Will continue to monitor.

## 2019-11-20 NOTE — PROGRESS NOTES
Sweet catheter and ARDEN drain taken out per order. Instructed pt to measure first void. Pt verbalized understanding. Will continue to monitor.

## 2019-11-21 LAB
ANION GAP SERPL CALCULATED.3IONS-SCNC: 10 MMOL/L (ref 3–16)
BUN BLDV-MCNC: 6 MG/DL (ref 7–20)
CALCIUM SERPL-MCNC: 7.9 MG/DL (ref 8.3–10.6)
CHLORIDE BLD-SCNC: 100 MMOL/L (ref 99–110)
CO2: 25 MMOL/L (ref 21–32)
CREAT SERPL-MCNC: 0.7 MG/DL (ref 0.6–1.1)
GFR AFRICAN AMERICAN: >60
GFR NON-AFRICAN AMERICAN: >60
GLUCOSE BLD-MCNC: 110 MG/DL (ref 70–99)
HCT VFR BLD CALC: 34.1 % (ref 36–48)
HEMOGLOBIN: 11.5 G/DL (ref 12–16)
MCH RBC QN AUTO: 27.5 PG (ref 26–34)
MCHC RBC AUTO-ENTMCNC: 33.7 G/DL (ref 31–36)
MCV RBC AUTO: 81.7 FL (ref 80–100)
PDW BLD-RTO: 14 % (ref 12.4–15.4)
PLATELET # BLD: 246 K/UL (ref 135–450)
PMV BLD AUTO: 8 FL (ref 5–10.5)
POTASSIUM SERPL-SCNC: 3.8 MMOL/L (ref 3.5–5.1)
RBC # BLD: 4.17 M/UL (ref 4–5.2)
SODIUM BLD-SCNC: 135 MMOL/L (ref 136–145)
WBC # BLD: 9.6 K/UL (ref 4–11)

## 2019-11-21 PROCEDURE — 6370000000 HC RX 637 (ALT 250 FOR IP): Performed by: UROLOGY

## 2019-11-21 PROCEDURE — 85027 COMPLETE CBC AUTOMATED: CPT

## 2019-11-21 PROCEDURE — 36415 COLL VENOUS BLD VENIPUNCTURE: CPT

## 2019-11-21 PROCEDURE — 80048 BASIC METABOLIC PNL TOTAL CA: CPT

## 2019-11-21 PROCEDURE — 2580000003 HC RX 258: Performed by: UROLOGY

## 2019-11-21 PROCEDURE — 2500000003 HC RX 250 WO HCPCS: Performed by: UROLOGY

## 2019-11-21 PROCEDURE — 1200000000 HC SEMI PRIVATE

## 2019-11-21 PROCEDURE — 6360000002 HC RX W HCPCS: Performed by: UROLOGY

## 2019-11-21 RX ADMIN — SENNOSIDES AND DOCUSATE SODIUM 1 TABLET: 8.6; 5 TABLET ORAL at 20:13

## 2019-11-21 RX ADMIN — OXYCODONE HYDROCHLORIDE 5 MG: 5 TABLET ORAL at 03:52

## 2019-11-21 RX ADMIN — SENNOSIDES AND DOCUSATE SODIUM 1 TABLET: 8.6; 5 TABLET ORAL at 08:46

## 2019-11-21 RX ADMIN — OXYCODONE HYDROCHLORIDE 5 MG: 5 TABLET ORAL at 08:46

## 2019-11-21 RX ADMIN — OXYCODONE HYDROCHLORIDE 5 MG: 5 TABLET ORAL at 14:30

## 2019-11-21 RX ADMIN — OXYCODONE HYDROCHLORIDE 5 MG: 5 TABLET ORAL at 18:39

## 2019-11-21 RX ADMIN — Medication 10 ML: at 20:14

## 2019-11-21 RX ADMIN — ENOXAPARIN SODIUM 40 MG: 40 INJECTION SUBCUTANEOUS at 08:47

## 2019-11-21 RX ADMIN — POTASSIUM CHLORIDE, DEXTROSE MONOHYDRATE AND SODIUM CHLORIDE: 150; 5; 450 INJECTION, SOLUTION INTRAVENOUS at 03:52

## 2019-11-21 ASSESSMENT — PAIN SCALES - GENERAL
PAINLEVEL_OUTOF10: 6
PAINLEVEL_OUTOF10: 7
PAINLEVEL_OUTOF10: 8
PAINLEVEL_OUTOF10: 7

## 2019-11-21 NOTE — PLAN OF CARE
Problem: Falls - Risk of:  Goal: Absence of physical injury  Description  Absence of physical injury  Note:   Pt wearing non-skid socks, bed locked and in low position, call light within reach.

## 2019-11-21 NOTE — PLAN OF CARE
Pt remained free of falls. Call light within reach. Stand by assist/selfer, family helps to bathroom at times. Non skid footwear in place. Bed locked and in lowest position. Will continue to monitor.

## 2019-11-21 NOTE — PROGRESS NOTES
Pt alert and oriented, VSS. Shift assessment completed and documented. Pt denies any needs at thist time. Bed locked and in lowest position. Call light within reach. Will continue to monitor.

## 2019-11-21 NOTE — PROGRESS NOTES
Assessment completed and documented. VSS. A/ox4. Denies pain. family at bedside. Midline incision and 4 laps covered in mepliex c/d/i. Denies further needs at this time. Bed locked and in lowest position. Bedside table and call light within reach. Will continue to monitor.

## 2019-11-22 VITALS
DIASTOLIC BLOOD PRESSURE: 81 MMHG | SYSTOLIC BLOOD PRESSURE: 118 MMHG | BODY MASS INDEX: 42.19 KG/M2 | HEIGHT: 67 IN | HEART RATE: 88 BPM | WEIGHT: 268.8 LBS | RESPIRATION RATE: 16 BRPM | OXYGEN SATURATION: 98 % | TEMPERATURE: 98.1 F

## 2019-11-22 LAB
ANION GAP SERPL CALCULATED.3IONS-SCNC: 11 MMOL/L (ref 3–16)
BUN BLDV-MCNC: 8 MG/DL (ref 7–20)
CALCIUM SERPL-MCNC: 8.1 MG/DL (ref 8.3–10.6)
CHLORIDE BLD-SCNC: 100 MMOL/L (ref 99–110)
CO2: 24 MMOL/L (ref 21–32)
CREAT SERPL-MCNC: 0.8 MG/DL (ref 0.6–1.1)
GFR AFRICAN AMERICAN: >60
GFR NON-AFRICAN AMERICAN: >60
GLUCOSE BLD-MCNC: 155 MG/DL (ref 70–99)
HCT VFR BLD CALC: 32.2 % (ref 36–48)
HEMOGLOBIN: 10.9 G/DL (ref 12–16)
MCH RBC QN AUTO: 27.7 PG (ref 26–34)
MCHC RBC AUTO-ENTMCNC: 33.7 G/DL (ref 31–36)
MCV RBC AUTO: 82.2 FL (ref 80–100)
PDW BLD-RTO: 13.8 % (ref 12.4–15.4)
PLATELET # BLD: 228 K/UL (ref 135–450)
PMV BLD AUTO: 8.1 FL (ref 5–10.5)
POTASSIUM SERPL-SCNC: 3.6 MMOL/L (ref 3.5–5.1)
RBC # BLD: 3.92 M/UL (ref 4–5.2)
SODIUM BLD-SCNC: 135 MMOL/L (ref 136–145)
WBC # BLD: 7.9 K/UL (ref 4–11)

## 2019-11-22 PROCEDURE — 36415 COLL VENOUS BLD VENIPUNCTURE: CPT

## 2019-11-22 PROCEDURE — 2580000003 HC RX 258: Performed by: UROLOGY

## 2019-11-22 PROCEDURE — 6360000002 HC RX W HCPCS: Performed by: UROLOGY

## 2019-11-22 PROCEDURE — 80048 BASIC METABOLIC PNL TOTAL CA: CPT

## 2019-11-22 PROCEDURE — 6370000000 HC RX 637 (ALT 250 FOR IP): Performed by: UROLOGY

## 2019-11-22 PROCEDURE — 85027 COMPLETE CBC AUTOMATED: CPT

## 2019-11-22 RX ORDER — ONDANSETRON 4 MG/1
4 TABLET, FILM COATED ORAL 3 TIMES DAILY PRN
Qty: 15 TABLET | Refills: 0 | Status: SHIPPED | OUTPATIENT
Start: 2019-11-22 | End: 2020-06-19

## 2019-11-22 RX ORDER — OXYCODONE HYDROCHLORIDE 5 MG/1
5 TABLET ORAL EVERY 6 HOURS PRN
Qty: 20 TABLET | Refills: 0 | Status: SHIPPED | OUTPATIENT
Start: 2019-11-22 | End: 2019-11-27

## 2019-11-22 RX ADMIN — OXYCODONE HYDROCHLORIDE 5 MG: 5 TABLET ORAL at 04:41

## 2019-11-22 RX ADMIN — SENNOSIDES AND DOCUSATE SODIUM 1 TABLET: 8.6; 5 TABLET ORAL at 08:54

## 2019-11-22 RX ADMIN — Medication 10 ML: at 08:43

## 2019-11-22 RX ADMIN — OXYCODONE HYDROCHLORIDE 5 MG: 5 TABLET ORAL at 08:54

## 2019-11-22 RX ADMIN — ENOXAPARIN SODIUM 40 MG: 40 INJECTION SUBCUTANEOUS at 08:54

## 2019-11-22 ASSESSMENT — PAIN SCALES - GENERAL: PAINLEVEL_OUTOF10: 5

## 2019-11-22 NOTE — CARE COORDINATION
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 Role of CTN and care transition program explained to patient. Spoke with patient who reports she resides in a private residence with spouse and 3 y/o child. 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 Patient denied the need for any [place] [address] services post discharge and plans to return home. Patient did state she has some financial concerns as she has had several surgeries already this year and has exhausted her [employer] card. Patient reported her  has been in and out of work as well. Patient agreeable to  referral and also for Meds to Bed. CTN provided patient with list of medications resources as well to help with cost. If meets criteria at discharge, patient agreeable to participate in care transition program post discharge. Encouraged patient to ask to speak with  on the unit should any needs arise. Notified FARIBA Soto RN of the above and she completed referral to  SHAQUILLE Garcias. CTN left sticky note for MD requesting Meds to Bed and also notified outpatient pharmacy. Readmission Risk  Patient Active Problem List   Diagnosis    Insomnia    Migraine with aura    Pelvic peritoneal endometriosis    Dysmenorrhea    Family history of ischemic heart disease    Obesity (BMI 30-39. 9)    History of ovarian cyst    Interstitial cystitis    Vitamin D deficiency    Depression    Snoring    Chronic fatigue    Palpitations    Precordial pain    CRP elevated    Arthralgia of shoulder region    Protruded cervical disc    Neck pain    Abscess of right breast    Left renal mass       Inpatient Assessment  Care Transitions Summary    Care Transitions Inpatient Review  Medication Review  Are you able to afford your medications?:  Yes  How often do you have difficulty taking your medications?:  I always take them as prescribed. Housing Review  Who do you live with?:  Partner/Spouse/SO, Child  Are you an active caregiver in your home?:  Yes  For whom are you the caregiver?:  child   Social Support  Do you have a ?:  No  Do you have a [facility]?:  No  Durable Medical Equipment  Functional Review  Ability to seek help/take action for Emergent/Urgent situations i.e. fire, crime, inclement weather or health crisis. :  Independent  Ability handle personal hygiene needs (bathing/dressing/grooming): Independent  Ability to manage medications: Independent  Ability to prepare food:  Independent  Ability to maintain home (clean home, laundry): Independent  Ability to drive and/or has transportation:  Independent  Ability to do shopping:  Independent  Ability to manage finances: Independent  Is patient able to live independently?:  Yes  Hearing and Vision  Visual Impairment:  Visual impairment (Glasses/contacts)  Hearing Impairment:  None  Care Transitions Interventions                                 Follow Up  No future appointments. Health Maintenance  There are no preventive care reminders to display for this patient.     Kaley CLARKN, RN, [facility]  Care Transition Nurse   [phone]

## 2019-11-22 NOTE — DISCHARGE INSTR - ACTIVITY
No lifting, Driving, or Strenuous exercise until cleared by   No driving while taking pain medication

## 2019-11-22 NOTE — PROGRESS NOTES
Pt is a/o x4. VSS. Assessment as charted. - Pt has adequate urine output since d/c panda/ Urine is slightly red/bloody. - Pt complains of slight abd tenderness r/t surgical incision. Pt is currently resting in her bed that is locked and in its lowest position with her call light within reach and significant other at the bedside. Pt bed alarm is off bc she is a/o and does not need assistance with ambulation. Pt denies any other needs at this time. Will continue to monitor.

## 2019-11-26 ENCOUNTER — CARE COORDINATION (OUTPATIENT)
Dept: OTHER | Facility: CLINIC | Age: 29
End: 2019-11-26

## 2019-12-06 ENCOUNTER — CARE COORDINATION (OUTPATIENT)
Dept: OTHER | Facility: CLINIC | Age: 29
End: 2019-12-06

## 2019-12-11 ENCOUNTER — CARE COORDINATION (OUTPATIENT)
Dept: OTHER | Facility: CLINIC | Age: 29
End: 2019-12-11

## 2020-01-10 RX ORDER — GABAPENTIN 600 MG/1
TABLET ORAL
Qty: 90 TABLET | Refills: 2 | Status: SHIPPED | OUTPATIENT
Start: 2020-01-10 | End: 2020-04-05 | Stop reason: SDUPTHER

## 2020-01-20 ENCOUNTER — PATIENT MESSAGE (OUTPATIENT)
Dept: FAMILY MEDICINE CLINIC | Age: 30
End: 2020-01-20

## 2020-01-20 RX ORDER — LORAZEPAM 1 MG/1
TABLET ORAL
Qty: 45 TABLET | Refills: 0 | Status: SHIPPED | OUTPATIENT
Start: 2020-01-20 | End: 2020-03-18 | Stop reason: SDUPTHER

## 2020-01-21 ENCOUNTER — TELEPHONE (OUTPATIENT)
Dept: FAMILY MEDICINE CLINIC | Age: 30
End: 2020-01-21

## 2020-02-03 ENCOUNTER — HOSPITAL ENCOUNTER (OUTPATIENT)
Age: 30
Discharge: HOME | End: 2020-02-03
Payer: COMMERCIAL

## 2020-02-03 LAB
PROGESTERONE LEVEL: 6.08 NG/ML
TSH SERPL DL<=0.05 MIU/L-ACNC: 2.1 UIU/ML (ref 0.27–4.2)

## 2020-02-03 PROCEDURE — 84144 ASSAY OF PROGESTERONE: CPT

## 2020-02-03 PROCEDURE — 84443 ASSAY THYROID STIM HORMONE: CPT

## 2020-02-03 PROCEDURE — 36415 COLL VENOUS BLD VENIPUNCTURE: CPT

## 2020-02-19 ENCOUNTER — E-VISIT (OUTPATIENT)
Dept: FAMILY MEDICINE CLINIC | Age: 30
End: 2020-02-19
Payer: COMMERCIAL

## 2020-02-19 PROCEDURE — 98970 NQHP OL DIG ASSMT&MGMT 5-10: CPT | Performed by: NURSE PRACTITIONER

## 2020-02-19 ASSESSMENT — LIFESTYLE VARIABLES: SMOKING_STATUS: NO, I'VE NEVER SMOKED

## 2020-02-29 ENCOUNTER — HOSPITAL ENCOUNTER (OUTPATIENT)
Age: 30
Discharge: HOME | End: 2020-02-29
Payer: COMMERCIAL

## 2020-02-29 LAB
PROGESTERONE LEVEL: 16.99 NG/ML
TSH SERPL DL<=0.05 MIU/L-ACNC: 2.58 UIU/ML (ref 0.27–4.2)

## 2020-02-29 PROCEDURE — 84144 ASSAY OF PROGESTERONE: CPT

## 2020-02-29 PROCEDURE — 36415 COLL VENOUS BLD VENIPUNCTURE: CPT

## 2020-02-29 PROCEDURE — 84443 ASSAY THYROID STIM HORMONE: CPT

## 2020-03-18 RX ORDER — LORAZEPAM 1 MG/1
TABLET ORAL
Qty: 45 TABLET | Refills: 0 | Status: SHIPPED | OUTPATIENT
Start: 2020-03-18 | End: 2020-04-14 | Stop reason: SDUPTHER

## 2020-03-30 ENCOUNTER — HOSPITAL ENCOUNTER (OUTPATIENT)
Age: 30
Discharge: HOME | End: 2020-03-30
Payer: COMMERCIAL

## 2020-03-30 LAB — PROGESTERONE LEVEL: 12.65 NG/ML

## 2020-03-30 PROCEDURE — 84144 ASSAY OF PROGESTERONE: CPT

## 2020-03-30 PROCEDURE — 36415 COLL VENOUS BLD VENIPUNCTURE: CPT

## 2020-04-03 ENCOUNTER — E-VISIT (OUTPATIENT)
Dept: FAMILY MEDICINE CLINIC | Age: 30
End: 2020-04-03
Payer: COMMERCIAL

## 2020-04-03 PROCEDURE — 98970 NQHP OL DIG ASSMT&MGMT 5-10: CPT | Performed by: NURSE PRACTITIONER

## 2020-04-03 RX ORDER — FLUCONAZOLE 150 MG/1
150 TABLET ORAL
Qty: 2 TABLET | Refills: 0 | Status: SHIPPED | OUTPATIENT
Start: 2020-04-03 | End: 2020-04-09

## 2020-04-06 RX ORDER — GABAPENTIN 600 MG/1
TABLET ORAL
Qty: 90 TABLET | Refills: 2 | Status: SHIPPED | OUTPATIENT
Start: 2020-04-06 | End: 2020-07-06 | Stop reason: SDUPTHER

## 2020-04-16 RX ORDER — LORAZEPAM 1 MG/1
TABLET ORAL
Qty: 45 TABLET | Refills: 0 | Status: SHIPPED | OUTPATIENT
Start: 2020-04-16 | End: 2020-05-14 | Stop reason: SDUPTHER

## 2020-04-28 ENCOUNTER — HOSPITAL ENCOUNTER (OUTPATIENT)
Age: 30
Discharge: HOME | End: 2020-04-28
Payer: COMMERCIAL

## 2020-04-28 LAB — PROGESTERONE LEVEL: 10.18 NG/ML

## 2020-04-28 PROCEDURE — 84144 ASSAY OF PROGESTERONE: CPT

## 2020-04-28 PROCEDURE — 36415 COLL VENOUS BLD VENIPUNCTURE: CPT

## 2020-05-14 ENCOUNTER — TELEPHONE (OUTPATIENT)
Dept: FAMILY MEDICINE CLINIC | Age: 30
End: 2020-05-14

## 2020-05-14 ENCOUNTER — E-VISIT (OUTPATIENT)
Dept: FAMILY MEDICINE CLINIC | Age: 30
End: 2020-05-14
Payer: COMMERCIAL

## 2020-05-14 PROCEDURE — 99212 OFFICE O/P EST SF 10 MIN: CPT | Performed by: FAMILY MEDICINE

## 2020-05-14 RX ORDER — LORAZEPAM 1 MG/1
TABLET ORAL
Qty: 45 TABLET | Refills: 0 | Status: SHIPPED | OUTPATIENT
Start: 2020-05-14 | End: 2020-06-24 | Stop reason: SDUPTHER

## 2020-05-14 ASSESSMENT — ANXIETY QUESTIONNAIRES
7. FEELING AFRAID AS IF SOMETHING AWFUL MIGHT HAPPEN: 1-SEVERAL DAYS
4. TROUBLE RELAXING: 1-SEVERAL DAYS
6. BECOMING EASILY ANNOYED OR IRRITABLE: SEVERAL DAYS
GAD7 TOTAL SCORE: 5
7. FEELING AFRAID AS IF SOMETHING AWFUL MIGHT HAPPEN: SEVERAL DAYS
6. BECOMING EASILY ANNOYED OR IRRITABLE: 1-SEVERAL DAYS
1. FEELING NERVOUS, ANXIOUS, OR ON EDGE: SEVERAL DAYS
4. TROUBLE RELAXING: SEVERAL DAYS
3. WORRYING TOO MUCH ABOUT DIFFERENT THINGS: NOT AT ALL
5. BEING SO RESTLESS THAT IT IS HARD TO SIT STILL: NOT AT ALL
5. BEING SO RESTLESS THAT IT IS HARD TO SIT STILL: 0-NOT AT ALL
2. NOT BEING ABLE TO STOP OR CONTROL WORRYING: 1-SEVERAL DAYS
2. NOT BEING ABLE TO STOP OR CONTROL WORRYING: SEVERAL DAYS
1. FEELING NERVOUS, ANXIOUS, OR ON EDGE: 1-SEVERAL DAYS
GAD7 TOTAL SCORE: 5
3. WORRYING TOO MUCH ABOUT DIFFERENT THINGS: 0-NOT AT ALL

## 2020-05-14 ASSESSMENT — PATIENT HEALTH QUESTIONNAIRE - PHQ9
7. TROUBLE CONCENTRATING ON THINGS, SUCH AS READING THE NEWSPAPER OR WATCHING TELEVISION: 1
6. FEELING BAD ABOUT YOURSELF - OR THAT YOU ARE A FAILURE OR HAVE LET YOURSELF OR YOUR FAMILY DOWN: 0
4. FEELING TIRED OR HAVING LITTLE ENERGY: SEVERAL DAYS
5. POOR APPETITE OR OVEREATING: NOT AT ALL
SUM OF ALL RESPONSES TO PHQ9 QUESTIONS 1 & 2: 0
6. FEELING BAD ABOUT YOURSELF - OR THAT YOU ARE A FAILURE OR HAVE LET YOURSELF OR YOUR FAMILY DOWN: NOT AT ALL
9. THOUGHTS THAT YOU WOULD BE BETTER OFF DEAD, OR OF HURTING YOURSELF: NOT AT ALL
4. FEELING TIRED OR HAVING LITTLE ENERGY: 1
SUM OF ALL RESPONSES TO PHQ QUESTIONS 1-9: 2
5. POOR APPETITE OR OVEREATING: 0
2. FEELING DOWN, DEPRESSED OR HOPELESS: NOT AT ALL
7. TROUBLE CONCENTRATING ON THINGS, SUCH AS READING THE NEWSPAPER OR WATCHING TELEVISION: SEVERAL DAYS
1. LITTLE INTEREST OR PLEASURE IN DOING THINGS: NOT AT ALL
1. LITTLE INTEREST OR PLEASURE IN DOING THINGS: 0
10. IF YOU CHECKED OFF ANY PROBLEMS, HOW DIFFICULT HAVE THESE PROBLEMS MADE IT FOR YOU TO DO YOUR WORK, TAKE CARE OF THINGS AT HOME, OR GET ALONG WITH OTHER PEOPLE: NOT DIFFICULT AT ALL
8. MOVING OR SPEAKING SO SLOWLY THAT OTHER PEOPLE COULD HAVE NOTICED. OR THE OPPOSITE - BEING SO FIDGETY OR RESTLESS THAT YOU HAVE BEEN MOVING AROUND A LOT MORE THAN USUAL: NOT AT ALL
2. FEELING DOWN, DEPRESSED OR HOPELESS: 0
8. MOVING OR SPEAKING SO SLOWLY THAT OTHER PEOPLE COULD HAVE NOTICED. OR THE OPPOSITE, BEING SO FIGETY OR RESTLESS THAT YOU HAVE BEEN MOVING AROUND A LOT MORE THAN USUAL: 0
3. TROUBLE FALLING OR STAYING ASLEEP: SEVERAL DAYS
SUM OF ALL RESPONSES TO PHQ QUESTIONS 1-9: 2
9. THOUGHTS THAT YOU WOULD BE BETTER OFF DEAD, OR OF HURTING YOURSELF: 0
SUM OF ALL RESPONSES TO PHQ QUESTIONS 1-9: 3

## 2020-05-14 ASSESSMENT — PATIENT HEALTH QUESTIONNAIRE - GENERAL
HAVE YOU BEEN EXPERIENCING NEW OR WORSENING PROBLEMS WITH YOUR SEXUAL FUNCTION: N
HAVE YOU BEEN EXPERIENCING NEW OR WORSENING VISUAL CHANGES: N
HAVE YOU BEEN EXPERIENCING LIGHT HEADEDNESS, WOOZINESS, OR DIZZINESS, ESPECIALLY UPON STANDING FROM SITTING OR LYING POSITIONS: N
SINCE YOUR LAST VISIT, HAVE YOU EXPERIENCED EPISODES OF FAINTING OR NEAR FAINTING: N
HAVE YOU BEEN EXPERIENCING VERY LOW OR VERY HIGH MOODS: N
HAVE YOU BEEN EXPERIENCING INVOLUNTARY WEIGHT GAIN OR LOSS: N
HAVE YOU BEEN EXPERIENCING AN UNUSUALLY DRY MOUTH: N
HAVE YOU BEEN EXPERIENCING ABDOMINAL DISCOMFORT, NAUSEA OR CHANGES IN YOUR BOWEL PATTERN: N
HAVE YOU BEEN EXPERIENCING NEW OR WORSENING HEADACHES: N
HAVE YOU BEEN EXPERIENCING HALLUCINATIONS OR CONFUSION: N
HAVE YOU BEEN EXPERIENCING RACING THOUGHTS OR IMPULSIVE BEHAVIOR: N
DO YOU HAVE ANY NEW RASHES OR UNEXPLAINED ITCHING OR SWELLING: N
HAVE YOU BEEN EXPERIENCING VIVID DREAMS OR NIGHTMARES THAT INTERFERE WITH YOUR SLEEP: N
HAVE YOU BEEN EXPERIENCING NEW OR WORSENING MUSCLE TWITCHING, SHAKINESS, OR OTHER UNUSUAL CHANGES IN YOUR MUSCLE MOVEMENT: N
HAVE YOU BEEN EXPERIENCING NEW OR WORSENING DIFFICULTY WITH URINATION OR CHANGE IN URINARY PATTERN: N
HAVE YOU BEEN EXPERIENCING UNEXPLAINED HIGH FEVERS OR EXCESSIVE SWEATING: N
DO YOU HAVE A FASTER HEART RATE THAN USUAL, DOES YOUR HEART RATE FEEL IRREGULAR OR DO YOU FEEL LIKE YOUR HEART IS POUNDING AT REST: N

## 2020-06-10 ENCOUNTER — TELEPHONE (OUTPATIENT)
Dept: FAMILY MEDICINE CLINIC | Age: 30
End: 2020-06-10

## 2020-06-16 ENCOUNTER — HOSPITAL ENCOUNTER (OUTPATIENT)
Age: 30
Discharge: HOME | End: 2020-06-16
Payer: COMMERCIAL

## 2020-06-16 LAB
A/G RATIO: 1.4 (ref 1.1–2.2)
ALBUMIN SERPL-MCNC: 4.2 G/DL (ref 3.4–5)
ALP BLD-CCNC: 86 U/L (ref 40–129)
ALT SERPL-CCNC: 21 U/L (ref 10–40)
ANION GAP SERPL CALCULATED.3IONS-SCNC: 12 MMOL/L (ref 3–16)
AST SERPL-CCNC: 18 U/L (ref 15–37)
BASOPHILS ABSOLUTE: 0 K/UL (ref 0–0.2)
BASOPHILS RELATIVE PERCENT: 0.6 %
BILIRUB SERPL-MCNC: 0.5 MG/DL (ref 0–1)
BUN BLDV-MCNC: 13 MG/DL (ref 7–20)
CALCIUM SERPL-MCNC: 8.8 MG/DL (ref 8.3–10.6)
CHLORIDE BLD-SCNC: 99 MMOL/L (ref 99–110)
CO2: 25 MMOL/L (ref 21–32)
CREAT SERPL-MCNC: 0.7 MG/DL (ref 0.6–1.1)
EOSINOPHILS ABSOLUTE: 0.1 K/UL (ref 0–0.6)
EOSINOPHILS RELATIVE PERCENT: 2.4 %
GFR AFRICAN AMERICAN: >60
GFR NON-AFRICAN AMERICAN: >60
GLOBULIN: 2.9 G/DL
GLUCOSE BLD-MCNC: 104 MG/DL (ref 70–99)
HCT VFR BLD CALC: 37.1 % (ref 36–48)
HEMOGLOBIN: 12.3 G/DL (ref 12–16)
LYMPHOCYTES ABSOLUTE: 1.4 K/UL (ref 1–5.1)
LYMPHOCYTES RELATIVE PERCENT: 25.9 %
MCH RBC QN AUTO: 27.2 PG (ref 26–34)
MCHC RBC AUTO-ENTMCNC: 33.1 G/DL (ref 31–36)
MCV RBC AUTO: 82.2 FL (ref 80–100)
MONOCYTES ABSOLUTE: 0.3 K/UL (ref 0–1.3)
MONOCYTES RELATIVE PERCENT: 6.4 %
NEUTROPHILS ABSOLUTE: 3.5 K/UL (ref 1.7–7.7)
NEUTROPHILS RELATIVE PERCENT: 64.7 %
PDW BLD-RTO: 14.4 % (ref 12.4–15.4)
PLATELET # BLD: 290 K/UL (ref 135–450)
PMV BLD AUTO: 9.2 FL (ref 5–10.5)
POTASSIUM SERPL-SCNC: 4 MMOL/L (ref 3.5–5.1)
RBC # BLD: 4.51 M/UL (ref 4–5.2)
SODIUM BLD-SCNC: 136 MMOL/L (ref 136–145)
TOTAL PROTEIN: 7.1 G/DL (ref 6.4–8.2)
WBC # BLD: 5.5 K/UL (ref 4–11)

## 2020-06-16 PROCEDURE — 85025 COMPLETE CBC W/AUTO DIFF WBC: CPT

## 2020-06-16 PROCEDURE — 80053 COMPREHEN METABOLIC PANEL: CPT

## 2020-06-16 PROCEDURE — 36415 COLL VENOUS BLD VENIPUNCTURE: CPT

## 2020-06-19 ENCOUNTER — OFFICE VISIT (OUTPATIENT)
Dept: PRIMARY CARE CLINIC | Age: 30
End: 2020-06-19
Payer: COMMERCIAL

## 2020-06-19 PROCEDURE — 99211 OFF/OP EST MAY X REQ PHY/QHP: CPT | Performed by: FAMILY MEDICINE

## 2020-06-19 NOTE — PROGRESS NOTES
Patient is having a/an [facility] with Dr. Michelle Mendieta on 6/25/20 and was seen at clinic for pre op covid test. . Patient instructed to self quarantine until the procedure.

## 2020-06-20 LAB
SARS-COV-2: NOT DETECTED
SOURCE: NORMAL

## 2020-06-23 ENCOUNTER — OFFICE VISIT (OUTPATIENT)
Dept: FAMILY MEDICINE CLINIC | Age: 30
End: 2020-06-23
Payer: COMMERCIAL

## 2020-06-23 ENCOUNTER — ANESTHESIA EVENT (OUTPATIENT)
Dept: OPERATING ROOM | Age: 30
End: 2020-06-23
Payer: COMMERCIAL

## 2020-06-23 VITALS
DIASTOLIC BLOOD PRESSURE: 82 MMHG | SYSTOLIC BLOOD PRESSURE: 115 MMHG | WEIGHT: 266.2 LBS | HEIGHT: 67 IN | TEMPERATURE: 97.5 F | OXYGEN SATURATION: 98 % | HEART RATE: 72 BPM | BODY MASS INDEX: 41.78 KG/M2

## 2020-06-23 LAB
CHOLESTEROL, TOTAL: 118 MG/DL (ref 0–199)
HDLC SERPL-MCNC: 32 MG/DL (ref 40–60)
LDL CHOLESTEROL CALCULATED: 73 MG/DL
TRIGL SERPL-MCNC: 64 MG/DL (ref 0–150)
VLDLC SERPL CALC-MCNC: 13 MG/DL

## 2020-06-23 PROCEDURE — 99395 PREV VISIT EST AGE 18-39: CPT | Performed by: FAMILY MEDICINE

## 2020-06-23 ASSESSMENT — ENCOUNTER SYMPTOMS
ABDOMINAL PAIN: 0
SHORTNESS OF BREATH: 0
BLOOD IN STOOL: 0
COUGH: 0
CHEST TIGHTNESS: 0
CONSTIPATION: 0
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
## 2020-06-24 RX ORDER — LORAZEPAM 1 MG/1
TABLET ORAL
Qty: 45 TABLET | Refills: 2 | Status: SHIPPED | OUTPATIENT
Start: 2020-06-24 | End: 2020-09-29 | Stop reason: SDUPTHER

## 2020-06-25 ENCOUNTER — HOSPITAL ENCOUNTER (OUTPATIENT)
Age: 30
Setting detail: OUTPATIENT SURGERY
Discharge: HOME | End: 2020-06-25
Attending: OBSTETRICS & GYNECOLOGY | Admitting: OBSTETRICS & GYNECOLOGY
Payer: COMMERCIAL

## 2020-06-25 ENCOUNTER — ANESTHESIA (OUTPATIENT)
Dept: OPERATING ROOM | Age: 30
End: 2020-06-25
Payer: COMMERCIAL

## 2020-06-25 VITALS
DIASTOLIC BLOOD PRESSURE: 76 MMHG | HEIGHT: 67 IN | OXYGEN SATURATION: 99 % | RESPIRATION RATE: 15 BRPM | HEART RATE: 77 BPM | WEIGHT: 263 LBS | BODY MASS INDEX: 41.28 KG/M2 | SYSTOLIC BLOOD PRESSURE: 112 MMHG | TEMPERATURE: 97.9 F

## 2020-06-25 VITALS
SYSTOLIC BLOOD PRESSURE: 136 MMHG | DIASTOLIC BLOOD PRESSURE: 103 MMHG | RESPIRATION RATE: 17 BRPM | OXYGEN SATURATION: 100 %

## 2020-06-25 LAB — PREGNANCY, URINE: NEGATIVE

## 2020-06-25 PROCEDURE — 7100000000 HC PACU RECOVERY - FIRST 15 MIN: Performed by: OBSTETRICS & GYNECOLOGY

## 2020-06-25 PROCEDURE — 7100000001 HC PACU RECOVERY - ADDTL 15 MIN: Performed by: OBSTETRICS & GYNECOLOGY

## 2020-06-25 PROCEDURE — 7100000010 HC PHASE II RECOVERY - FIRST 15 MIN: Performed by: OBSTETRICS & GYNECOLOGY

## 2020-06-25 PROCEDURE — 6360000002 HC RX W HCPCS: Performed by: ANESTHESIOLOGY

## 2020-06-25 PROCEDURE — 6370000000 HC RX 637 (ALT 250 FOR IP)

## 2020-06-25 PROCEDURE — 6360000002 HC RX W HCPCS: Performed by: NURSE ANESTHETIST, CERTIFIED REGISTERED

## 2020-06-25 PROCEDURE — 2580000003 HC RX 258: Performed by: ANESTHESIOLOGY

## 2020-06-25 PROCEDURE — 2580000003 HC RX 258: Performed by: OBSTETRICS & GYNECOLOGY

## 2020-06-25 PROCEDURE — 84703 CHORIONIC GONADOTROPIN ASSAY: CPT

## 2020-06-25 PROCEDURE — 6360000002 HC RX W HCPCS: Performed by: OBSTETRICS & GYNECOLOGY

## 2020-06-25 PROCEDURE — 3700000000 HC ANESTHESIA ATTENDED CARE: Performed by: OBSTETRICS & GYNECOLOGY

## 2020-06-25 PROCEDURE — 2580000003 HC RX 258: Performed by: NURSE ANESTHETIST, CERTIFIED REGISTERED

## 2020-06-25 PROCEDURE — 88305 TISSUE EXAM BY PATHOLOGIST: CPT

## 2020-06-25 PROCEDURE — 2709999900 HC NON-CHARGEABLE SUPPLY: Performed by: OBSTETRICS & GYNECOLOGY

## 2020-06-25 PROCEDURE — 3700000001 HC ADD 15 MINUTES (ANESTHESIA): Performed by: OBSTETRICS & GYNECOLOGY

## 2020-06-25 PROCEDURE — 2500000003 HC RX 250 WO HCPCS: Performed by: NURSE ANESTHETIST, CERTIFIED REGISTERED

## 2020-06-25 PROCEDURE — 7100000011 HC PHASE II RECOVERY - ADDTL 15 MIN: Performed by: OBSTETRICS & GYNECOLOGY

## 2020-06-25 PROCEDURE — 2500000003 HC RX 250 WO HCPCS: Performed by: ANESTHESIOLOGY

## 2020-06-25 PROCEDURE — 2500000003 HC RX 250 WO HCPCS: Performed by: OBSTETRICS & GYNECOLOGY

## 2020-06-25 PROCEDURE — 6370000000 HC RX 637 (ALT 250 FOR IP): Performed by: NURSE ANESTHETIST, CERTIFIED REGISTERED

## 2020-06-25 PROCEDURE — 3600000014 HC SURGERY LEVEL 4 ADDTL 15MIN: Performed by: OBSTETRICS & GYNECOLOGY

## 2020-06-25 PROCEDURE — 6360000002 HC RX W HCPCS

## 2020-06-25 PROCEDURE — 3600000004 HC SURGERY LEVEL 4 BASE: Performed by: OBSTETRICS & GYNECOLOGY

## 2020-06-25 RX ORDER — SODIUM CHLORIDE, SODIUM LACTATE, POTASSIUM CHLORIDE, CALCIUM CHLORIDE 600; 310; 30; 20 MG/100ML; MG/100ML; MG/100ML; MG/100ML
INJECTION, SOLUTION INTRAVENOUS CONTINUOUS PRN
Status: COMPLETED | OUTPATIENT
Start: 2020-06-25 | End: 2020-06-25

## 2020-06-25 RX ORDER — SODIUM CHLORIDE, SODIUM LACTATE, POTASSIUM CHLORIDE, CALCIUM CHLORIDE 600; 310; 30; 20 MG/100ML; MG/100ML; MG/100ML; MG/100ML
INJECTION, SOLUTION INTRAVENOUS CONTINUOUS
Status: DISCONTINUED | OUTPATIENT
Start: 2020-06-25 | End: 2020-06-25 | Stop reason: HOSPADM

## 2020-06-25 RX ORDER — ROCURONIUM BROMIDE 10 MG/ML
INJECTION, SOLUTION INTRAVENOUS PRN
Status: DISCONTINUED | OUTPATIENT
Start: 2020-06-25 | End: 2020-06-25 | Stop reason: SDUPTHER

## 2020-06-25 RX ORDER — HYDROCODONE BITARTRATE AND ACETAMINOPHEN 5; 325 MG/1; MG/1
1 TABLET ORAL EVERY 4 HOURS PRN
Qty: 8 TABLET | Refills: 0 | Status: SHIPPED | OUTPATIENT
Start: 2020-06-25 | End: 2020-06-28

## 2020-06-25 RX ORDER — ONDANSETRON 2 MG/ML
INJECTION INTRAMUSCULAR; INTRAVENOUS PRN
Status: DISCONTINUED | OUTPATIENT
Start: 2020-06-25 | End: 2020-06-25 | Stop reason: SDUPTHER

## 2020-06-25 RX ORDER — SODIUM CHLORIDE 0.9 % (FLUSH) 0.9 %
10 SYRINGE (ML) INJECTION PRN
Status: DISCONTINUED | OUTPATIENT
Start: 2020-06-25 | End: 2020-06-25 | Stop reason: HOSPADM

## 2020-06-25 RX ORDER — MORPHINE SULFATE 2 MG/ML
2 INJECTION, SOLUTION INTRAMUSCULAR; INTRAVENOUS EVERY 5 MIN PRN
Status: DISCONTINUED | OUTPATIENT
Start: 2020-06-25 | End: 2020-06-25 | Stop reason: HOSPADM

## 2020-06-25 RX ORDER — MIDAZOLAM HYDROCHLORIDE 1 MG/ML
INJECTION INTRAMUSCULAR; INTRAVENOUS PRN
Status: DISCONTINUED | OUTPATIENT
Start: 2020-06-25 | End: 2020-06-25 | Stop reason: SDUPTHER

## 2020-06-25 RX ORDER — HYDROCODONE BITARTRATE AND ACETAMINOPHEN 5; 325 MG/1; MG/1
1 TABLET ORAL EVERY 4 HOURS PRN
Qty: 6 TABLET | Refills: 0 | OUTPATIENT
Start: 2020-06-25 | End: 2020-06-28

## 2020-06-25 RX ORDER — MORPHINE SULFATE 2 MG/ML
1 INJECTION, SOLUTION INTRAMUSCULAR; INTRAVENOUS EVERY 5 MIN PRN
Status: DISCONTINUED | OUTPATIENT
Start: 2020-06-25 | End: 2020-06-25 | Stop reason: HOSPADM

## 2020-06-25 RX ORDER — DEXAMETHASONE SODIUM PHOSPHATE 4 MG/ML
INJECTION, SOLUTION INTRA-ARTICULAR; INTRALESIONAL; INTRAMUSCULAR; INTRAVENOUS; SOFT TISSUE PRN
Status: DISCONTINUED | OUTPATIENT
Start: 2020-06-25 | End: 2020-06-25 | Stop reason: SDUPTHER

## 2020-06-25 RX ORDER — SCOLOPAMINE TRANSDERMAL SYSTEM 1 MG/1
PATCH, EXTENDED RELEASE TRANSDERMAL PRN
Status: DISCONTINUED | OUTPATIENT
Start: 2020-06-25 | End: 2020-06-25 | Stop reason: SDUPTHER

## 2020-06-25 RX ORDER — KETOROLAC TROMETHAMINE 30 MG/ML
INJECTION, SOLUTION INTRAMUSCULAR; INTRAVENOUS PRN
Status: DISCONTINUED | OUTPATIENT
Start: 2020-06-25 | End: 2020-06-25 | Stop reason: SDUPTHER

## 2020-06-25 RX ORDER — PROMETHAZINE HYDROCHLORIDE 25 MG/ML
INJECTION, SOLUTION INTRAMUSCULAR; INTRAVENOUS
Status: COMPLETED
Start: 2020-06-25 | End: 2020-06-25

## 2020-06-25 RX ORDER — METHYLENE BLUE 10 MG/ML
INJECTION INTRAVENOUS PRN
Status: DISCONTINUED | OUTPATIENT
Start: 2020-06-25 | End: 2020-06-25 | Stop reason: ALTCHOICE

## 2020-06-25 RX ORDER — SODIUM CHLORIDE 0.9 % (FLUSH) 0.9 %
10 SYRINGE (ML) INJECTION EVERY 12 HOURS SCHEDULED
Status: DISCONTINUED | OUTPATIENT
Start: 2020-06-25 | End: 2020-06-25 | Stop reason: HOSPADM

## 2020-06-25 RX ORDER — IBUPROFEN 600 MG/1
600 TABLET ORAL EVERY 6 HOURS PRN
Qty: 25 TABLET | Refills: 1 | Status: ON HOLD | OUTPATIENT
Start: 2020-06-25 | End: 2020-11-02 | Stop reason: HOSPADM

## 2020-06-25 RX ORDER — PROPOFOL 10 MG/ML
INJECTION, EMULSION INTRAVENOUS PRN
Status: DISCONTINUED | OUTPATIENT
Start: 2020-06-25 | End: 2020-06-25 | Stop reason: SDUPTHER

## 2020-06-25 RX ORDER — FENTANYL CITRATE 50 UG/ML
INJECTION, SOLUTION INTRAMUSCULAR; INTRAVENOUS PRN
Status: DISCONTINUED | OUTPATIENT
Start: 2020-06-25 | End: 2020-06-25 | Stop reason: SDUPTHER

## 2020-06-25 RX ORDER — SCOLOPAMINE TRANSDERMAL SYSTEM 1 MG/1
PATCH, EXTENDED RELEASE TRANSDERMAL
Status: COMPLETED
Start: 2020-06-25 | End: 2020-06-25

## 2020-06-25 RX ORDER — IBUPROFEN 600 MG/1
600 TABLET ORAL EVERY 6 HOURS PRN
Qty: 15 TABLET | Refills: 1 | OUTPATIENT
Start: 2020-06-25

## 2020-06-25 RX ORDER — PROMETHAZINE HYDROCHLORIDE 25 MG/ML
6.25 INJECTION, SOLUTION INTRAMUSCULAR; INTRAVENOUS ONCE
Status: COMPLETED | OUTPATIENT
Start: 2020-06-25 | End: 2020-06-25

## 2020-06-25 RX ORDER — OXYCODONE HYDROCHLORIDE AND ACETAMINOPHEN 5; 325 MG/1; MG/1
1 TABLET ORAL PRN
Status: DISCONTINUED | OUTPATIENT
Start: 2020-06-25 | End: 2020-06-25 | Stop reason: HOSPADM

## 2020-06-25 RX ORDER — ONDANSETRON 2 MG/ML
4 INJECTION INTRAMUSCULAR; INTRAVENOUS
Status: DISCONTINUED | OUTPATIENT
Start: 2020-06-25 | End: 2020-06-25 | Stop reason: HOSPADM

## 2020-06-25 RX ORDER — APREPITANT 40 MG/1
40 CAPSULE ORAL ONCE
Status: COMPLETED | OUTPATIENT
Start: 2020-06-25 | End: 2020-06-25

## 2020-06-25 RX ORDER — OXYCODONE HYDROCHLORIDE AND ACETAMINOPHEN 5; 325 MG/1; MG/1
2 TABLET ORAL PRN
Status: DISCONTINUED | OUTPATIENT
Start: 2020-06-25 | End: 2020-06-25 | Stop reason: HOSPADM

## 2020-06-25 RX ORDER — LIDOCAINE HYDROCHLORIDE 20 MG/ML
INJECTION, SOLUTION INFILTRATION; PERINEURAL PRN
Status: DISCONTINUED | OUTPATIENT
Start: 2020-06-25 | End: 2020-06-25 | Stop reason: SDUPTHER

## 2020-06-25 RX ORDER — LIDOCAINE HYDROCHLORIDE 10 MG/ML
2 INJECTION, SOLUTION INFILTRATION; PERINEURAL
Status: DISCONTINUED | OUTPATIENT
Start: 2020-06-25 | End: 2020-06-25 | Stop reason: HOSPADM

## 2020-06-25 RX ORDER — BUPIVACAINE HYDROCHLORIDE AND EPINEPHRINE 5; 5 MG/ML; UG/ML
INJECTION, SOLUTION PERINEURAL PRN
Status: DISCONTINUED | OUTPATIENT
Start: 2020-06-25 | End: 2020-06-25 | Stop reason: ALTCHOICE

## 2020-06-25 RX ADMIN — PROMETHAZINE HYDROCHLORIDE 6.25 MG: 25 INJECTION INTRAMUSCULAR; INTRAVENOUS at 09:49

## 2020-06-25 RX ADMIN — FAMOTIDINE 20 MG: 10 INJECTION, SOLUTION INTRAVENOUS at 07:25

## 2020-06-25 RX ADMIN — ONDANSETRON 4 MG: 2 INJECTION INTRAMUSCULAR; INTRAVENOUS at 07:37

## 2020-06-25 RX ADMIN — LIDOCAINE HYDROCHLORIDE 100 MG: 20 INJECTION, SOLUTION INFILTRATION; PERINEURAL at 07:42

## 2020-06-25 RX ADMIN — DEXMEDETOMIDINE HYDROCHLORIDE 4 MCG: 100 INJECTION, SOLUTION INTRAVENOUS at 07:46

## 2020-06-25 RX ADMIN — FENTANYL CITRATE 100 MCG: 50 INJECTION INTRAMUSCULAR; INTRAVENOUS at 07:37

## 2020-06-25 RX ADMIN — PROMETHAZINE HYDROCHLORIDE 6.25 MG: 25 INJECTION, SOLUTION INTRAMUSCULAR; INTRAVENOUS at 09:49

## 2020-06-25 RX ADMIN — HYDROMORPHONE HYDROCHLORIDE 0.5 MG: 1 INJECTION, SOLUTION INTRAMUSCULAR; INTRAVENOUS; SUBCUTANEOUS at 09:48

## 2020-06-25 RX ADMIN — DEXMEDETOMIDINE HYDROCHLORIDE 4 MCG: 100 INJECTION, SOLUTION INTRAVENOUS at 08:03

## 2020-06-25 RX ADMIN — MIDAZOLAM HYDROCHLORIDE 2 MG: 2 INJECTION, SOLUTION INTRAMUSCULAR; INTRAVENOUS at 07:37

## 2020-06-25 RX ADMIN — HYDROMORPHONE HYDROCHLORIDE 0.5 MG: 1 INJECTION, SOLUTION INTRAMUSCULAR; INTRAVENOUS; SUBCUTANEOUS at 10:04

## 2020-06-25 RX ADMIN — KETOROLAC TROMETHAMINE 30 MG: 30 INJECTION, SOLUTION INTRAMUSCULAR; INTRAVENOUS at 09:02

## 2020-06-25 RX ADMIN — LIDOCAINE HYDROCHLORIDE 60 MG: 20 INJECTION, SOLUTION INFILTRATION; PERINEURAL at 09:18

## 2020-06-25 RX ADMIN — DEXAMETHASONE SODIUM PHOSPHATE 8 MG: 4 INJECTION, SOLUTION INTRAMUSCULAR; INTRAVENOUS at 07:42

## 2020-06-25 RX ADMIN — SUGAMMADEX 239 MG: 100 INJECTION, SOLUTION INTRAVENOUS at 09:02

## 2020-06-25 RX ADMIN — ROCURONIUM BROMIDE 10 MG: 10 SOLUTION INTRAVENOUS at 08:28

## 2020-06-25 RX ADMIN — DEXMEDETOMIDINE HYDROCHLORIDE 4 MCG: 100 INJECTION, SOLUTION INTRAVENOUS at 07:50

## 2020-06-25 RX ADMIN — SODIUM CHLORIDE, POTASSIUM CHLORIDE, SODIUM LACTATE AND CALCIUM CHLORIDE: 600; 310; 30; 20 INJECTION, SOLUTION INTRAVENOUS at 08:30

## 2020-06-25 RX ADMIN — PROPOFOL 200 MG: 10 INJECTION, EMULSION INTRAVENOUS at 07:42

## 2020-06-25 RX ADMIN — DEXMEDETOMIDINE HYDROCHLORIDE 4 MCG: 100 INJECTION, SOLUTION INTRAVENOUS at 07:54

## 2020-06-25 RX ADMIN — SCOPALAMINE 1 PATCH: 1 PATCH, EXTENDED RELEASE TRANSDERMAL at 07:37

## 2020-06-25 RX ADMIN — ROCURONIUM BROMIDE 50 MG: 10 SOLUTION INTRAVENOUS at 07:42

## 2020-06-25 RX ADMIN — SODIUM CHLORIDE, POTASSIUM CHLORIDE, SODIUM LACTATE AND CALCIUM CHLORIDE: 600; 310; 30; 20 INJECTION, SOLUTION INTRAVENOUS at 07:37

## 2020-06-25 RX ADMIN — DEXMEDETOMIDINE HYDROCHLORIDE 4 MCG: 100 INJECTION, SOLUTION INTRAVENOUS at 07:59

## 2020-06-25 RX ADMIN — APREPITANT 40 MG: 40 CAPSULE ORAL at 07:24

## 2020-06-25 ASSESSMENT — PULMONARY FUNCTION TESTS
PIF_VALUE: 22
PIF_VALUE: 5
PIF_VALUE: 20
PIF_VALUE: 20
PIF_VALUE: 17
PIF_VALUE: 49
PIF_VALUE: 3
PIF_VALUE: 1
PIF_VALUE: 21
PIF_VALUE: 20
PIF_VALUE: 21
PIF_VALUE: 28
PIF_VALUE: 18
PIF_VALUE: 1
PIF_VALUE: 20
PIF_VALUE: 20
PIF_VALUE: 32
PIF_VALUE: 26
PIF_VALUE: 21
PIF_VALUE: 21
PIF_VALUE: 20
PIF_VALUE: 30
PIF_VALUE: 25
PIF_VALUE: 22
PIF_VALUE: 26
PIF_VALUE: 26
PIF_VALUE: 20
PIF_VALUE: 20
PIF_VALUE: 25
PIF_VALUE: 31
PIF_VALUE: 20
PIF_VALUE: 21
PIF_VALUE: 26
PIF_VALUE: 20
PIF_VALUE: 19
PIF_VALUE: 25
PIF_VALUE: 26
PIF_VALUE: 21
PIF_VALUE: 26
PIF_VALUE: 31
PIF_VALUE: 22
PIF_VALUE: 30
PIF_VALUE: 26
PIF_VALUE: 20
PIF_VALUE: 30
PIF_VALUE: 25
PIF_VALUE: 31
PIF_VALUE: 22
PIF_VALUE: 2
PIF_VALUE: 35
PIF_VALUE: 20
PIF_VALUE: 31
PIF_VALUE: 20
PIF_VALUE: 3
PIF_VALUE: 21
PIF_VALUE: 31
PIF_VALUE: 2
PIF_VALUE: 26
PIF_VALUE: 32
PIF_VALUE: 32
PIF_VALUE: 0
PIF_VALUE: 8
PIF_VALUE: 19
PIF_VALUE: 21
PIF_VALUE: 20
PIF_VALUE: 31
PIF_VALUE: 19
PIF_VALUE: 21
PIF_VALUE: 11
PIF_VALUE: 2
PIF_VALUE: 20
PIF_VALUE: 1
PIF_VALUE: 26
PIF_VALUE: 21
PIF_VALUE: 23
PIF_VALUE: 20
PIF_VALUE: 31
PIF_VALUE: 22
PIF_VALUE: 25
PIF_VALUE: 21
PIF_VALUE: 32
PIF_VALUE: 20
PIF_VALUE: 21
PIF_VALUE: 20
PIF_VALUE: 2
PIF_VALUE: 26
PIF_VALUE: 19
PIF_VALUE: 29
PIF_VALUE: 21
PIF_VALUE: 21
PIF_VALUE: 22
PIF_VALUE: 30
PIF_VALUE: 22
PIF_VALUE: 1
PIF_VALUE: 2
PIF_VALUE: 21
PIF_VALUE: 25
PIF_VALUE: 20
PIF_VALUE: 30
PIF_VALUE: 20
PIF_VALUE: 20
PIF_VALUE: 18
PIF_VALUE: 21

## 2020-06-25 ASSESSMENT — PAIN SCALES - GENERAL
PAINLEVEL_OUTOF10: 6
PAINLEVEL_OUTOF10: 7

## 2020-06-25 ASSESSMENT — PAIN - FUNCTIONAL ASSESSMENT: PAIN_FUNCTIONAL_ASSESSMENT: 0-10

## 2020-06-25 ASSESSMENT — PAIN DESCRIPTION - ORIENTATION: ORIENTATION: LOWER

## 2020-06-25 ASSESSMENT — PAIN DESCRIPTION - LOCATION: LOCATION: ABDOMEN

## 2020-06-25 ASSESSMENT — LIFESTYLE VARIABLES: SMOKING_STATUS: 0

## 2020-06-25 ASSESSMENT — ENCOUNTER SYMPTOMS: SHORTNESS OF BREATH: 0

## 2020-06-25 ASSESSMENT — PAIN DESCRIPTION - PAIN TYPE: TYPE: SURGICAL PAIN

## 2020-06-25 NOTE — ANESTHESIA POSTPROCEDURE EVALUATION
Department of Anesthesiology  Postprocedure Note    Patient: Herlene Soulier  MRN: [id number]  YOB: 1990  Date of evaluation: 6/25/2020  Time:  4:07 PM     Procedure Summary     Date:  06/25/20 Room / Location:  [address]    Anesthesia Start:  9897 Anesthesia Stop:  1846    Procedure:  DIAGNOSTIC LAPAROSCOPY,  DILATATION AND CURETTAGE, HYSTEROSCOPY  CPT CODE - 30589 (N/A ) Diagnosis:       Pelvic pain in female      Endometriosis      (PELVIC PAIN; ENDOMETRIOSIS)    Surgeon:  Jeff Shepherd MD Responsible Provider:  Joe Holliday MD    Anesthesia Type:  general ASA Status:  3          Anesthesia Type: general    Chinedu Phase I: Chinedu Score: 10    Chinedu Phase II: Chinedu Score: 10    Last vitals: Reviewed and per EMR flowsheets.    Vitals:    06/25/20 1000 06/25/20 1005 06/25/20 1010 06/25/20 1030   BP: 131/71 135/79 (!) 126/45 112/76   Pulse: 62 85 77    Resp: 12 16 15    Temp:   97.9 °F (36.6 °C)    TempSrc:   Temporal    SpO2: 97% 97% 94% 99%   Weight:       Height:           Anesthesia Post Evaluation    Patient location during evaluation: bedside  Patient participation: complete - patient participated  Level of consciousness: awake and alert  Airway patency: patent  Nausea & Vomiting: no nausea  Complications: no  Cardiovascular status: hemodynamically stable  Respiratory status: acceptable  Hydration status: euvolemic

## 2020-06-25 NOTE — BRIEF OP NOTE
Brief Postoperative Note      Patient: Abisai Juárez  YOB: 1990  MRN: [id number]    Date of Procedure: 6/25/2020    Pre-Op Diagnosis: PELVIC PAIN; ENDOMETRIOSIS    Post-Op Diagnosis: Same       Procedure(s):  DIAGNOSTIC LAPAROSCOPY, DILATATION AND CURETTAGE, HYSTEROSCOPY  CPT CODE - 68541    Surgeon(s):  Mary Espinosa MD    Assistant:  First Assistant: Eron Beckman RN    Anesthesia: General    Estimated Blood Loss (mL): less than 50     Complications: None    Specimens:   endometrial      Drains:   NG/OG/NJ/NE Tube Orogastric 18 fr Center mouth (Active)       Findings: dense adnexal adhesions R>L, dense posterior/anterior CDS adhesions. No evidence of active endometriosis.  Negative chromopertubation    Electronically signed by Azael Darling MD on 6/25/2020 at 9:03 AM
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
## 2020-06-25 NOTE — H&P
I have reviewed the history and physical and examined the patient and find no relevant changes. I have reviewed with the patient and/or family the risks, benefits, and alternatives to the procedure.     Ayan Barraza

## 2020-06-25 NOTE — OP NOTE
[facility]                 MraceloAlek johnsonadama                                 OPERATIVE REPORT    PATIENT NAME: Eldora Goldberg                    :        1990  MED REC NO:   [id number]                          ROOM:  ACCOUNT NO:   [de-identified]                           ADMIT DATE: 2020  PROVIDER:     Suellen Multani MD    DATE OF PROCEDURE:  2020    PREOPERATIVE DIAGNOSES:  Pelvic pain, menorrhagia, history of  endometriosis. POSTOPERATIVE DIAGNOSES:  Pelvic pain, menorrhagia, history of  endometriosis. PROCEDURE:  Diagnostic laparoscopy with chromopertubation, diagnostic  hysteroscopy, D and C.    SURGEON:  Suellen Multani MD    ANESTHESIA:  General.    ESTIMATED BLOOD LOSS:  Less than 50 mL. FINDINGS: anterior cul-de-sac scarring, bilateral  adnexal adhesions, (right greater than left). No evidence of active  endometriosis. The patient is status post appendectomy,  cholecystectomy, and partial renal resection. INDICATIONS AND CONSENT:  A 27-year-old who presents for evaluation of  pelvic pain, menorrhagia, and history of endometriosis. Procedure was  reviewed, side effects, benefits, risks; and consent was obtained. Preoperative UPT was negative as was COVID testing. DESCRIPTION OF PROCEDURE:  The patient was moved to the operating room  suite, and placed in the OR table in a supine position. General  anesthetic was administered. She was placed in a dorsal lithotomy  position utilizing Sagar stirrups. Exam under anesthesia was performed. Uterus was immobile, but normal in size and contour. There are no  adnexal masses. The patient was prepped and draped in a sterile  fashion. The bladder was emptied in a sterile fashion. Attention was directed to the vagina. Weighted speculum was placed into  the posterior vagina. Single-tooth tenaculum used to grasp the cervix.    Cervix was gently dilated case.  The patient was awakened in the OR and taken to  recovery room in good condition. All sponges and needles were accounted  for. FINDINGS:  Anterior CDS scarring with general uterine immobility. Ovarian adhesions to fossa bilateral right greater than left. No  evidence of active endometriosis. Negative chromopertubation.         Galo Cerda MD    D: 06/25/2020 9:14:30       T: 06/25/2020 9:22:33     [email or web address]  Job#: [id number]     Doc#: [id number]    CC:

## 2020-07-06 NOTE — TELEPHONE ENCOUNTER
.  Last office visit 2020     Last written 2020 90 with 2      Next office visit scheduled Visit date not found    Requested Prescriptions     Pending Prescriptions Disp Refills    gabapentin (NEURONTIN) 600 MG tablet 90 tablet 2     Si tid

## 2020-07-07 RX ORDER — GABAPENTIN 600 MG/1
TABLET ORAL
Qty: 90 TABLET | Refills: 2 | Status: SHIPPED | OUTPATIENT
Start: 2020-07-07 | End: 2020-09-28 | Stop reason: SDUPTHER

## 2020-07-07 RX ORDER — GABAPENTIN 600 MG/1
TABLET ORAL
Qty: 90 TABLET | Refills: 2 | Status: SHIPPED | OUTPATIENT
Start: 2020-07-07 | End: 2020-07-07

## 2020-08-03 RX ORDER — SULFAMETHOXAZOLE AND TRIMETHOPRIM 800; 160 MG/1; MG/1
1 TABLET ORAL 2 TIMES DAILY
Qty: 10 TABLET | Refills: 0 | Status: SHIPPED | OUTPATIENT
Start: 2020-08-03 | End: 2020-08-08

## 2020-10-26 NOTE — PROGRESS NOTES
Preoperative Screening for Elective Surgery/Invasive Procedures While COVID-19 present in the community     Have you had any of the following symptoms? o Fever, chills  o Cough  o Shortness of breath  o Muscle aches/pain  o Diarrhea  o Abdominal pain, nausea, vomiting  o Loss or decrease in taste and / or smell   Risk of Exposure  o Have you recently been hospitalized for COVID-19 or flu-like illness, if so when?  o Recently diagnosed with COVID-19, if so when?  o Recently tested for COVID-19, if so when?  o Have you been in close contact with a person or family member who currently has or recently had COVID-19? If yes, when and in what context?  o Do you live with anybody who in the last 14 days has had fever, chills, shortness of breath, muscle aches, flu-like illness?  o Do you have any close contacts or family members who are currently in the hospital for COVID-19 or flu-like illness? If yes, assess recent close contact with this person. Indicate if the patient has a positive screen by answering yes to one or more of the above questions. Patients who test positive or screen positive prior to surgery or on the day of surgery should be evaluated in conjunction with the surgeon/proceduralist/anesthesiologist to determine the urgency of the procedure. No to all questions.  covid 10/27/20

## 2020-10-27 ENCOUNTER — OFFICE VISIT (OUTPATIENT)
Dept: PRIMARY CARE CLINIC | Age: 30
End: 2020-10-27
Payer: COMMERCIAL

## 2020-10-27 DIAGNOSIS — Z01.818 PRE-OP TESTING: Primary | ICD-10-CM

## 2020-10-27 PROCEDURE — 99211 OFF/OP EST MAY X REQ PHY/QHP: CPT | Performed by: FAMILY MEDICINE

## 2020-10-27 NOTE — PROGRESS NOTES
Lindsey Hall received a viral test for COVID-19. They were educated on isolation and quarantine as appropriate. For any symptoms, they were directed to seek care from their PCP, given contact information to establish with a doctor, directed to an urgent care or the emergency room.

## 2020-10-27 NOTE — PATIENT INSTRUCTIONS

## 2020-10-29 LAB — SARS-COV-2, NAA: NOT DETECTED

## 2020-10-29 NOTE — RESULT ENCOUNTER NOTE

## 2020-10-30 ENCOUNTER — ANESTHESIA EVENT (OUTPATIENT)
Dept: OPERATING ROOM | Age: 30
End: 2020-10-30
Payer: COMMERCIAL

## 2020-11-02 ENCOUNTER — HOSPITAL ENCOUNTER (OUTPATIENT)
Age: 30
Setting detail: OUTPATIENT SURGERY
Discharge: HOME | End: 2020-11-02
Attending: OBSTETRICS & GYNECOLOGY | Admitting: OBSTETRICS & GYNECOLOGY
Payer: COMMERCIAL

## 2020-11-02 ENCOUNTER — ANESTHESIA (OUTPATIENT)
Dept: OPERATING ROOM | Age: 30
End: 2020-11-02
Payer: COMMERCIAL

## 2020-11-02 VITALS
OXYGEN SATURATION: 100 % | SYSTOLIC BLOOD PRESSURE: 123 MMHG | DIASTOLIC BLOOD PRESSURE: 67 MMHG | RESPIRATION RATE: 21 BRPM | TEMPERATURE: 97 F

## 2020-11-02 VITALS
WEIGHT: 233 LBS | SYSTOLIC BLOOD PRESSURE: 110 MMHG | RESPIRATION RATE: 12 BRPM | TEMPERATURE: 97.5 F | OXYGEN SATURATION: 100 % | HEIGHT: 67 IN | HEART RATE: 99 BPM | BODY MASS INDEX: 36.57 KG/M2 | DIASTOLIC BLOOD PRESSURE: 67 MMHG

## 2020-11-02 LAB — PREGNANCY, URINE: NEGATIVE

## 2020-11-02 PROCEDURE — 3600000019 HC SURGERY ROBOT ADDTL 15MIN: Performed by: OBSTETRICS & GYNECOLOGY

## 2020-11-02 PROCEDURE — 88305 TISSUE EXAM BY PATHOLOGIST: CPT

## 2020-11-02 PROCEDURE — 7100000000 HC PACU RECOVERY - FIRST 15 MIN: Performed by: OBSTETRICS & GYNECOLOGY

## 2020-11-02 PROCEDURE — 7100000001 HC PACU RECOVERY - ADDTL 15 MIN: Performed by: OBSTETRICS & GYNECOLOGY

## 2020-11-02 PROCEDURE — 6360000002 HC RX W HCPCS: Performed by: NURSE ANESTHETIST, CERTIFIED REGISTERED

## 2020-11-02 PROCEDURE — 2580000003 HC RX 258: Performed by: OBSTETRICS & GYNECOLOGY

## 2020-11-02 PROCEDURE — 84703 CHORIONIC GONADOTROPIN ASSAY: CPT

## 2020-11-02 PROCEDURE — 2500000003 HC RX 250 WO HCPCS: Performed by: NURSE ANESTHETIST, CERTIFIED REGISTERED

## 2020-11-02 PROCEDURE — 3700000000 HC ANESTHESIA ATTENDED CARE: Performed by: OBSTETRICS & GYNECOLOGY

## 2020-11-02 PROCEDURE — 6370000000 HC RX 637 (ALT 250 FOR IP): Performed by: ANESTHESIOLOGY

## 2020-11-02 PROCEDURE — 7100000010 HC PHASE II RECOVERY - FIRST 15 MIN: Performed by: OBSTETRICS & GYNECOLOGY

## 2020-11-02 PROCEDURE — 2500000003 HC RX 250 WO HCPCS: Performed by: OBSTETRICS & GYNECOLOGY

## 2020-11-02 PROCEDURE — 7100000011 HC PHASE II RECOVERY - ADDTL 15 MIN: Performed by: OBSTETRICS & GYNECOLOGY

## 2020-11-02 PROCEDURE — 3700000001 HC ADD 15 MINUTES (ANESTHESIA): Performed by: OBSTETRICS & GYNECOLOGY

## 2020-11-02 PROCEDURE — 6360000002 HC RX W HCPCS: Performed by: OBSTETRICS & GYNECOLOGY

## 2020-11-02 PROCEDURE — 6360000002 HC RX W HCPCS

## 2020-11-02 PROCEDURE — 2580000003 HC RX 258: Performed by: ANESTHESIOLOGY

## 2020-11-02 PROCEDURE — C1765 ADHESION BARRIER: HCPCS | Performed by: OBSTETRICS & GYNECOLOGY

## 2020-11-02 PROCEDURE — 2709999900 HC NON-CHARGEABLE SUPPLY: Performed by: OBSTETRICS & GYNECOLOGY

## 2020-11-02 PROCEDURE — 6360000002 HC RX W HCPCS: Performed by: ANESTHESIOLOGY

## 2020-11-02 PROCEDURE — S2900 ROBOTIC SURGICAL SYSTEM: HCPCS | Performed by: OBSTETRICS & GYNECOLOGY

## 2020-11-02 PROCEDURE — 3600000009 HC SURGERY ROBOT BASE: Performed by: OBSTETRICS & GYNECOLOGY

## 2020-11-02 DEVICE — BARRIER ADH ABSRB GYNECARE INTCEED ST L6XW5IN: Type: IMPLANTABLE DEVICE | Site: ABDOMEN | Status: FUNCTIONAL

## 2020-11-02 RX ORDER — FENTANYL CITRATE 50 UG/ML
INJECTION, SOLUTION INTRAMUSCULAR; INTRAVENOUS PRN
Status: DISCONTINUED | OUTPATIENT
Start: 2020-11-02 | End: 2020-11-02 | Stop reason: SDUPTHER

## 2020-11-02 RX ORDER — SODIUM CHLORIDE, SODIUM LACTATE, POTASSIUM CHLORIDE, AND CALCIUM CHLORIDE .6; .31; .03; .02 G/100ML; G/100ML; G/100ML; G/100ML
IRRIGANT IRRIGATION PRN
Status: DISCONTINUED | OUTPATIENT
Start: 2020-11-02 | End: 2020-11-02 | Stop reason: ALTCHOICE

## 2020-11-02 RX ORDER — MORPHINE SULFATE 2 MG/ML
2 INJECTION, SOLUTION INTRAMUSCULAR; INTRAVENOUS EVERY 5 MIN PRN
Status: DISCONTINUED | OUTPATIENT
Start: 2020-11-02 | End: 2020-11-02 | Stop reason: HOSPADM

## 2020-11-02 RX ORDER — ROCURONIUM BROMIDE 10 MG/ML
INJECTION, SOLUTION INTRAVENOUS PRN
Status: DISCONTINUED | OUTPATIENT
Start: 2020-11-02 | End: 2020-11-02 | Stop reason: SDUPTHER

## 2020-11-02 RX ORDER — OXYCODONE HYDROCHLORIDE AND ACETAMINOPHEN 5; 325 MG/1; MG/1
1 TABLET ORAL EVERY 6 HOURS PRN
Qty: 4 TABLET | Refills: 0 | Status: SHIPPED | OUTPATIENT
Start: 2020-11-02 | End: 2020-11-05

## 2020-11-02 RX ORDER — BUPIVACAINE HYDROCHLORIDE AND EPINEPHRINE 5; 5 MG/ML; UG/ML
INJECTION, SOLUTION PERINEURAL PRN
Status: DISCONTINUED | OUTPATIENT
Start: 2020-11-02 | End: 2020-11-02 | Stop reason: ALTCHOICE

## 2020-11-02 RX ORDER — PROMETHAZINE HYDROCHLORIDE 25 MG/ML
6.25 INJECTION, SOLUTION INTRAMUSCULAR; INTRAVENOUS
Status: DISCONTINUED | OUTPATIENT
Start: 2020-11-02 | End: 2020-11-02 | Stop reason: HOSPADM

## 2020-11-02 RX ORDER — SCOLOPAMINE TRANSDERMAL SYSTEM 1 MG/1
1 PATCH, EXTENDED RELEASE TRANSDERMAL ONCE
Status: DISCONTINUED | OUTPATIENT
Start: 2020-11-02 | End: 2020-11-02 | Stop reason: HOSPADM

## 2020-11-02 RX ORDER — APREPITANT 40 MG/1
40 CAPSULE ORAL ONCE
Status: COMPLETED | OUTPATIENT
Start: 2020-11-02 | End: 2020-11-02

## 2020-11-02 RX ORDER — HYDROMORPHONE HCL 110MG/55ML
PATIENT CONTROLLED ANALGESIA SYRINGE INTRAVENOUS PRN
Status: DISCONTINUED | OUTPATIENT
Start: 2020-11-02 | End: 2020-11-02 | Stop reason: SDUPTHER

## 2020-11-02 RX ORDER — LIDOCAINE HYDROCHLORIDE 20 MG/ML
INJECTION, SOLUTION INFILTRATION; PERINEURAL PRN
Status: DISCONTINUED | OUTPATIENT
Start: 2020-11-02 | End: 2020-11-02 | Stop reason: SDUPTHER

## 2020-11-02 RX ORDER — OXYCODONE HYDROCHLORIDE AND ACETAMINOPHEN 5; 325 MG/1; MG/1
2 TABLET ORAL PRN
Status: COMPLETED | OUTPATIENT
Start: 2020-11-02 | End: 2020-11-02

## 2020-11-02 RX ORDER — LABETALOL HYDROCHLORIDE 5 MG/ML
5 INJECTION, SOLUTION INTRAVENOUS EVERY 10 MIN PRN
Status: DISCONTINUED | OUTPATIENT
Start: 2020-11-02 | End: 2020-11-02 | Stop reason: HOSPADM

## 2020-11-02 RX ORDER — ONDANSETRON 2 MG/ML
INJECTION INTRAMUSCULAR; INTRAVENOUS PRN
Status: DISCONTINUED | OUTPATIENT
Start: 2020-11-02 | End: 2020-11-02 | Stop reason: SDUPTHER

## 2020-11-02 RX ORDER — MIDAZOLAM HYDROCHLORIDE 1 MG/ML
2 INJECTION INTRAMUSCULAR; INTRAVENOUS ONCE
Status: COMPLETED | OUTPATIENT
Start: 2020-11-02 | End: 2020-11-02

## 2020-11-02 RX ORDER — IBUPROFEN 600 MG/1
600 TABLET ORAL EVERY 6 HOURS PRN
Qty: 25 TABLET | Refills: 1 | Status: SHIPPED | OUTPATIENT
Start: 2020-11-02 | End: 2022-05-17

## 2020-11-02 RX ORDER — EPHEDRINE SULFATE 50 MG/ML
INJECTION INTRAVENOUS PRN
Status: DISCONTINUED | OUTPATIENT
Start: 2020-11-02 | End: 2020-11-02 | Stop reason: SDUPTHER

## 2020-11-02 RX ORDER — OXYCODONE HYDROCHLORIDE AND ACETAMINOPHEN 5; 325 MG/1; MG/1
1 TABLET ORAL PRN
Status: COMPLETED | OUTPATIENT
Start: 2020-11-02 | End: 2020-11-02

## 2020-11-02 RX ORDER — PROPOFOL 10 MG/ML
INJECTION, EMULSION INTRAVENOUS PRN
Status: DISCONTINUED | OUTPATIENT
Start: 2020-11-02 | End: 2020-11-02 | Stop reason: SDUPTHER

## 2020-11-02 RX ORDER — KETOROLAC TROMETHAMINE 30 MG/ML
INJECTION, SOLUTION INTRAMUSCULAR; INTRAVENOUS PRN
Status: DISCONTINUED | OUTPATIENT
Start: 2020-11-02 | End: 2020-11-02 | Stop reason: SDUPTHER

## 2020-11-02 RX ORDER — SODIUM CHLORIDE, SODIUM LACTATE, POTASSIUM CHLORIDE, CALCIUM CHLORIDE 600; 310; 30; 20 MG/100ML; MG/100ML; MG/100ML; MG/100ML
INJECTION, SOLUTION INTRAVENOUS CONTINUOUS
Status: DISCONTINUED | OUTPATIENT
Start: 2020-11-02 | End: 2020-11-02 | Stop reason: HOSPADM

## 2020-11-02 RX ORDER — HYDRALAZINE HYDROCHLORIDE 20 MG/ML
5 INJECTION INTRAMUSCULAR; INTRAVENOUS EVERY 10 MIN PRN
Status: DISCONTINUED | OUTPATIENT
Start: 2020-11-02 | End: 2020-11-02 | Stop reason: HOSPADM

## 2020-11-02 RX ORDER — METHYLENE BLUE 10 MG/ML
INJECTION INTRAVENOUS PRN
Status: DISCONTINUED | OUTPATIENT
Start: 2020-11-02 | End: 2020-11-02 | Stop reason: ALTCHOICE

## 2020-11-02 RX ORDER — CIPROFLOXACIN 2 MG/ML
400 INJECTION, SOLUTION INTRAVENOUS ONCE
Status: COMPLETED | OUTPATIENT
Start: 2020-11-02 | End: 2020-11-02

## 2020-11-02 RX ORDER — MEPERIDINE HYDROCHLORIDE 50 MG/ML
12.5 INJECTION INTRAMUSCULAR; INTRAVENOUS; SUBCUTANEOUS EVERY 5 MIN PRN
Status: DISCONTINUED | OUTPATIENT
Start: 2020-11-02 | End: 2020-11-02 | Stop reason: HOSPADM

## 2020-11-02 RX ORDER — DEXAMETHASONE SODIUM PHOSPHATE 4 MG/ML
INJECTION, SOLUTION INTRA-ARTICULAR; INTRALESIONAL; INTRAMUSCULAR; INTRAVENOUS; SOFT TISSUE PRN
Status: DISCONTINUED | OUTPATIENT
Start: 2020-11-02 | End: 2020-11-02 | Stop reason: SDUPTHER

## 2020-11-02 RX ORDER — GLYCOPYRROLATE 0.2 MG/ML
INJECTION INTRAMUSCULAR; INTRAVENOUS PRN
Status: DISCONTINUED | OUTPATIENT
Start: 2020-11-02 | End: 2020-11-02 | Stop reason: SDUPTHER

## 2020-11-02 RX ORDER — DIPHENHYDRAMINE HYDROCHLORIDE 50 MG/ML
12.5 INJECTION INTRAMUSCULAR; INTRAVENOUS
Status: DISCONTINUED | OUTPATIENT
Start: 2020-11-02 | End: 2020-11-02 | Stop reason: HOSPADM

## 2020-11-02 RX ORDER — MIDAZOLAM HYDROCHLORIDE 1 MG/ML
INJECTION INTRAMUSCULAR; INTRAVENOUS
Status: COMPLETED
Start: 2020-11-02 | End: 2020-11-02

## 2020-11-02 RX ORDER — ONDANSETRON 2 MG/ML
4 INJECTION INTRAMUSCULAR; INTRAVENOUS PRN
Status: DISCONTINUED | OUTPATIENT
Start: 2020-11-02 | End: 2020-11-02 | Stop reason: HOSPADM

## 2020-11-02 RX ORDER — MORPHINE SULFATE 2 MG/ML
1 INJECTION, SOLUTION INTRAMUSCULAR; INTRAVENOUS EVERY 5 MIN PRN
Status: DISCONTINUED | OUTPATIENT
Start: 2020-11-02 | End: 2020-11-02 | Stop reason: HOSPADM

## 2020-11-02 RX ADMIN — PROPOFOL 150 MG: 10 INJECTION, EMULSION INTRAVENOUS at 07:45

## 2020-11-02 RX ADMIN — MIDAZOLAM HYDROCHLORIDE 2 MG: 1 INJECTION INTRAMUSCULAR; INTRAVENOUS at 06:58

## 2020-11-02 RX ADMIN — DEXAMETHASONE SODIUM PHOSPHATE 4 MG: 4 INJECTION, SOLUTION INTRAMUSCULAR; INTRAVENOUS at 08:14

## 2020-11-02 RX ADMIN — LIDOCAINE HYDROCHLORIDE 40 MG: 20 INJECTION, SOLUTION INFILTRATION; PERINEURAL at 07:45

## 2020-11-02 RX ADMIN — PROPOFOL 50 MG: 10 INJECTION, EMULSION INTRAVENOUS at 07:47

## 2020-11-02 RX ADMIN — CIPROFLOXACIN 400 MG: 2 INJECTION, SOLUTION INTRAVENOUS at 08:02

## 2020-11-02 RX ADMIN — MIDAZOLAM 2 MG: 1 INJECTION INTRAMUSCULAR; INTRAVENOUS at 06:58

## 2020-11-02 RX ADMIN — HYDROMORPHONE HYDROCHLORIDE 0.5 MG: 1 INJECTION, SOLUTION INTRAMUSCULAR; INTRAVENOUS; SUBCUTANEOUS at 09:31

## 2020-11-02 RX ADMIN — FENTANYL CITRATE 100 MCG: 50 INJECTION INTRAMUSCULAR; INTRAVENOUS at 07:45

## 2020-11-02 RX ADMIN — HYDROMORPHONE HYDROCHLORIDE 0.5 MG: 2 INJECTION INTRAMUSCULAR; INTRAVENOUS; SUBCUTANEOUS at 08:37

## 2020-11-02 RX ADMIN — ONDANSETRON 4 MG: 2 INJECTION INTRAMUSCULAR; INTRAVENOUS at 10:10

## 2020-11-02 RX ADMIN — GLYCOPYRROLATE 0.2 MG: 0.2 INJECTION, SOLUTION INTRAMUSCULAR; INTRAVENOUS at 08:16

## 2020-11-02 RX ADMIN — SODIUM CHLORIDE, SODIUM LACTATE, POTASSIUM CHLORIDE, AND CALCIUM CHLORIDE: .6; .31; .03; .02 INJECTION, SOLUTION INTRAVENOUS at 08:46

## 2020-11-02 RX ADMIN — APREPITANT 40 MG: 40 CAPSULE ORAL at 06:58

## 2020-11-02 RX ADMIN — ONDANSETRON 4 MG: 2 INJECTION INTRAMUSCULAR; INTRAVENOUS at 08:41

## 2020-11-02 RX ADMIN — SUGAMMADEX 200 MG: 100 INJECTION, SOLUTION INTRAVENOUS at 08:53

## 2020-11-02 RX ADMIN — OXYCODONE HYDROCHLORIDE AND ACETAMINOPHEN 2 TABLET: 5; 325 TABLET ORAL at 11:42

## 2020-11-02 RX ADMIN — KETOROLAC TROMETHAMINE 30 MG: 30 INJECTION, SOLUTION INTRAMUSCULAR; INTRAVENOUS at 08:52

## 2020-11-02 RX ADMIN — SODIUM CHLORIDE, SODIUM LACTATE, POTASSIUM CHLORIDE, AND CALCIUM CHLORIDE: .6; .31; .03; .02 INJECTION, SOLUTION INTRAVENOUS at 07:39

## 2020-11-02 RX ADMIN — ROCURONIUM BROMIDE 50 MG: 10 SOLUTION INTRAVENOUS at 07:46

## 2020-11-02 RX ADMIN — METRONIDAZOLE 500 MG: 500 INJECTION, SOLUTION INTRAVENOUS at 07:52

## 2020-11-02 RX ADMIN — EPHEDRINE SULFATE 5 MG: 50 INJECTION INTRAVENOUS at 08:36

## 2020-11-02 ASSESSMENT — PAIN DESCRIPTION - PAIN TYPE
TYPE: SURGICAL PAIN

## 2020-11-02 ASSESSMENT — PULMONARY FUNCTION TESTS
PIF_VALUE: 2
PIF_VALUE: 33
PIF_VALUE: 33
PIF_VALUE: 31
PIF_VALUE: 21
PIF_VALUE: 0
PIF_VALUE: 21
PIF_VALUE: 5
PIF_VALUE: 3
PIF_VALUE: 33
PIF_VALUE: 18
PIF_VALUE: 1
PIF_VALUE: 37
PIF_VALUE: 37
PIF_VALUE: 34
PIF_VALUE: 31
PIF_VALUE: 32
PIF_VALUE: 21
PIF_VALUE: 34
PIF_VALUE: 31
PIF_VALUE: 28
PIF_VALUE: 21
PIF_VALUE: 3
PIF_VALUE: 6
PIF_VALUE: 21
PIF_VALUE: 33
PIF_VALUE: 30
PIF_VALUE: 34
PIF_VALUE: 7
PIF_VALUE: 34
PIF_VALUE: 21
PIF_VALUE: 20
PIF_VALUE: 22
PIF_VALUE: 36
PIF_VALUE: 33
PIF_VALUE: 21
PIF_VALUE: 2
PIF_VALUE: 3
PIF_VALUE: 33
PIF_VALUE: 4
PIF_VALUE: 31
PIF_VALUE: 21
PIF_VALUE: 0
PIF_VALUE: 2
PIF_VALUE: 33
PIF_VALUE: 21
PIF_VALUE: 5
PIF_VALUE: 1
PIF_VALUE: 2
PIF_VALUE: 3
PIF_VALUE: 36
PIF_VALUE: 26
PIF_VALUE: 1
PIF_VALUE: 22
PIF_VALUE: 21
PIF_VALUE: 32
PIF_VALUE: 32
PIF_VALUE: 36
PIF_VALUE: 26
PIF_VALUE: 8
PIF_VALUE: 3
PIF_VALUE: 22
PIF_VALUE: 3
PIF_VALUE: 23
PIF_VALUE: 25
PIF_VALUE: 23
PIF_VALUE: 3
PIF_VALUE: 18
PIF_VALUE: 32
PIF_VALUE: 34
PIF_VALUE: 2
PIF_VALUE: 23
PIF_VALUE: 3
PIF_VALUE: 35
PIF_VALUE: 4
PIF_VALUE: 3
PIF_VALUE: 23
PIF_VALUE: 30
PIF_VALUE: 35
PIF_VALUE: 3
PIF_VALUE: 20
PIF_VALUE: 24
PIF_VALUE: 22
PIF_VALUE: 21
PIF_VALUE: 10
PIF_VALUE: 38
PIF_VALUE: 22
PIF_VALUE: 30
PIF_VALUE: 3
PIF_VALUE: 32

## 2020-11-02 ASSESSMENT — PAIN DESCRIPTION - LOCATION
LOCATION: ABDOMEN

## 2020-11-02 ASSESSMENT — PAIN SCALES - GENERAL
PAINLEVEL_OUTOF10: 6
PAINLEVEL_OUTOF10: 7
PAINLEVEL_OUTOF10: 5
PAINLEVEL_OUTOF10: 0

## 2020-11-02 ASSESSMENT — PAIN DESCRIPTION - ORIENTATION
ORIENTATION: LOWER
ORIENTATION: LOWER

## 2020-11-02 ASSESSMENT — PAIN DESCRIPTION - DESCRIPTORS
DESCRIPTORS: CRAMPING
DESCRIPTORS: ACHING
DESCRIPTORS: PRESSURE
DESCRIPTORS: ACHING;CRAMPING

## 2020-11-02 ASSESSMENT — PAIN - FUNCTIONAL ASSESSMENT: PAIN_FUNCTIONAL_ASSESSMENT: PREVENTS OR INTERFERES SOME ACTIVE ACTIVITIES AND ADLS

## 2020-11-02 ASSESSMENT — PAIN DESCRIPTION - ONSET: ONSET: ON-GOING

## 2020-11-02 ASSESSMENT — PAIN DESCRIPTION - PROGRESSION
CLINICAL_PROGRESSION: GRADUALLY IMPROVING
CLINICAL_PROGRESSION: GRADUALLY WORSENING

## 2020-11-02 NOTE — H&P
I have reviewed the history and physical and examined the patient and find no relevant changes. I have reviewed with the patient and/or family the risks, benefits, and alternatives to the procedure.     Sudarshan Espinal

## 2020-11-02 NOTE — BRIEF OP NOTE
Brief Postoperative Note      Patient: Ritu Terrell  YOB: 1990  MRN: [id number]    Date of Procedure: 11/2/2020    Pre-Op Diagnosis: ENDOMETRIOSIS; PELVIC PAIN    Post-Op Diagnosis: Same       Procedure(s):  ROBOTIC ASSISTED LAPAROSCOPIC LYSIS OF ADHESIONS, EXCISION OF ENDOMETRIOSIS, ABLATION OF ENDOMETRIOSIS, AND CHROMOPERTUBATION    Surgeon(s):  Mary Fuller MD    Assistant:  Surgical Assistant: Tamie Sanford    Anesthesia: General    Estimated Blood Loss (mL): less than 50     Complications: None    Specimens:   ID Type Source Tests Collected by Time Destination   A : [facility] IMPLANTS Tissue Tissue SURGICAL PATHOLOGY Mary Fuller MD 11/2/2020 5726        Implants:  Implant Name Type Inv.  Item Serial No.  Lot No. LRB No. Used Action   BARRIER ADH ABSRB GYNECARE INTCEED ST [id number]  BARRIER ADH ABSRB GYNECARE INTCEED ST [id number]  [employer] [id number] N/A 1 Implanted         Drains:   [REMOVED] Urethral Catheter 16 fr (Removed)       Findings: posterior CDS endometriosis, bilateral fallopian tube adhesions, positive chromopertubation    Electronically signed by Vessie Dakins, MD on 11/2/2020 at 9:02 AM

## 2020-11-02 NOTE — PROGRESS NOTES
Pt asked to removed scopolamine patch because it was making her dizzy. Offered kayla mist. Pt states her nausea is better.

## 2020-11-02 NOTE — OP NOTE
[address] [facility]                 MarceloOle johnson                                OPERATIVE REPORT    PATIENT NAME: Zachariah Wood                    :        1990  MED REC NO:   [id number]                          ROOM:  ACCOUNT NO:   [de-identified]                           ADMIT DATE: 2020  PROVIDER:     Esther Sosa MD    DATE OF PROCEDURE:  2020    PREOPERATIVE DIAGNOSES:  Endometriosis and pelvic pain. POSTOPERATIVE DIAGNOSES:  Endometriosis and pelvic pain. PROCEDURES PERFORMED:  Robotic assisted laparoscopic lysis of adhesions,  excision of endometriosis, ablation of endometriosis, and  chromopertubation. SURGEON:  Esther Sosa MD    ANESTHESIA:  General.    ESTIMATED BLOOD LOSS:  Less than 50 mL. SPECIMEN:  Posterior cul-de-sac endometriosis implants. FINDINGS:  Posterior cul-de-sac endometriosis, bilateral fallopian tube  adhesions and positive chromopertubation. INDICATIONS AND CONSENT:  The patient is a 67-year-old who presents for  management with a known history of endometriosis. The patient's history  is remarkable for endometriosis with significant dysmenorrhea, pelvic  pain and dyspareunia. Side effects, benefits and risk of the surgery  were outlined. Consent was obtained. All questions were answered. DESCRIPTION OF PROCEDURE:  The patient was taken to the OR on  and  placed on the OR table in supine position. General anesthetic was  administered. The patient's arms were tucked at her side and she was  placed in dorsal lithotomy position utilizing Sagar stirrups. Great  care was taken to reduce any pressure points. The patient did receive  preoperative antibiotics and did have compression boots placed  preoperatively. Exam under anesthesia revealed an immobile uterus with multiple  posterior cul-de-sac nodules, largest being 3 mm to 4 mm.   The adnexa  were without gross masses. The patient was prepped and draped in a  sterile fashion. A Sweet was placed in sterile fashion. Attention was directed to the vagina. Weighted speculum was placed and  a single-tooth tenaculum was used to grasp the cervix. HUMI manipulator  was placed without difficulty. Extension tubing was attached and in a  sterile fashion placed on the draped abdominal wall for  chromopertubation. Gloves were changed and attention was directed to  the anterior abdominal wall. Utilizing the Optiview laparoscope, laparoscopic entry was carried out  above the umbilicus. Local injection of anesthetic was carried out at  all incision sites. Three more trocars were entered under direct  visualization without difficulty after establishment of  pneumoperitoneum. The robot was docked at this time and I did break  scrub to attend the surgeon console. Bowel, omentum, appendix, liver edge and stomach edge all were  visualized and appeared unremarkable. Attention was directed to the  pelvis. Anterior cul-de-sac was unremarkable. Uterus was immobile. On  the surface of the uterus there were several small endometriosis  implants, which were cauterized. Attention was directed to the  posterior cul-de-sac. With careful sharp dissection both fallopian tubes and their adjacent  ovaries were carefully mobilized. Endometriosis was noted in the  posterior cul-de-sac with careful dissection. Several endometriosis  implants were excised and handed off. The cul-de-sac was able to be  freely dissected to the pelvic floor. Copious amounts of irrigation was  used to explore the pelvic sidewalls as well as the sigmoid colon, which  were all intact. Hemostasis remained excellent. Chromopertubation was  performed and noted bilateral tubal patency. Irrigation was again used  to cleanse the pelvis. Interceed was placed on the pelvic floor and on  the pelvic sidewalls to assist in reduction of adhesion formation.

## 2020-11-02 NOTE — ANESTHESIA POSTPROCEDURE EVALUATION
SpO2:99 %, Height:5' 7\" (1.702 m), Weight:233 lb (105.7 kg)     LABS:    CBC  Lab Results       Component                Value               Date/Time                  WBC                      5.5                 06/16/2020 07:44 AM        HGB                      12.3                06/16/2020 07:44 AM        HCT                      37.1                06/16/2020 07:44 AM        PLT                      290                 06/16/2020 07:44 AM   RENAL  Lab Results       Component                Value               Date/Time                  NA                       136                 06/16/2020 07:44 AM        K                        4.0                 06/16/2020 07:44 AM        K                        5.0                 11/20/2019 05:55 AM        CL                       99                  06/16/2020 07:44 AM        CO2                      25                  06/16/2020 07:44 AM        BUN                      13                  06/16/2020 07:44 AM        CREATININE               0.7                 06/16/2020 07:44 AM        GLUCOSE                  104 (H)             06/16/2020 07:44 AM   COAGS  Lab Results       Component                Value               Date/Time                  PROTIME                  11.5                05/24/2015 05:30 PM        INR                      1.06                05/24/2015 05:30 PM        APTT                     28.6                05/24/2015 05:30 PM     Intake & Output:  @76FUEN@    Nausea & Vomiting:  No    Level of Consciousness:  Awake    Pain Assessment:  Adequate analgesia    Anesthesia Complications:  No apparent anesthetic complications    SUMMARY      Vital signs stable  OK to discharge from Stage I post anesthesia care.   Care transferred from Anesthesiology department on discharge from perioperative area

## 2020-11-02 NOTE — PROGRESS NOTES
Patient report received from [facility]. Patient arrived to [facility] for discharge. Patient is alert and oriented. C/O pain 7/10 in abdomen. Pt c/o nausea. Pt was medicated prior to arrival with zofran. Will continue to monitor symptoms     brought to bedside.  Call light is within reach
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
## 2020-11-02 NOTE — PROGRESS NOTES
Discharge instructions given to patient and  at bedside. Questions answered.  has already picked up two prescriptions from [facility]patient [facility].

## 2020-11-24 ENCOUNTER — PATIENT MESSAGE (OUTPATIENT)
Dept: FAMILY MEDICINE CLINIC | Age: 30
End: 2020-11-24

## 2020-11-24 RX ORDER — LORAZEPAM 1 MG/1
TABLET ORAL
Qty: 45 TABLET | Refills: 0 | Status: SHIPPED | OUTPATIENT
Start: 2020-11-24 | End: 2020-12-22

## 2020-11-24 NOTE — TELEPHONE ENCOUNTER
Last Seen: 6/23/2020    Last Writen: 9/29/2020    Last UDS: none    OARRS Run On:     Med Agreement Signed On: none    Next Appointment: Visit date not found

## 2020-11-24 NOTE — TELEPHONE ENCOUNTER
From: Aryan Cortez  To: Ethel Bermudez DO  Sent: 11/24/2020 9:14 AM EST  Subject: Prescription Question    Hi Dr. Lucille Ye,    I wanted to see if I could have a refill on the Ativan? Sent to [facility] please.      Thank you,     Cindy Langston

## 2020-12-17 RX ORDER — AMOXICILLIN 875 MG/1
875 TABLET, COATED ORAL 2 TIMES DAILY
Qty: 20 TABLET | Refills: 0 | Status: SHIPPED | OUTPATIENT
Start: 2020-12-17 | End: 2020-12-27

## 2020-12-30 RX ORDER — GABAPENTIN 600 MG/1
TABLET ORAL
Qty: 90 TABLET | Refills: 1 | Status: SHIPPED | OUTPATIENT
Start: 2020-12-30 | End: 2021-02-02 | Stop reason: SDUPTHER

## 2021-02-02 ENCOUNTER — VIRTUAL VISIT (OUTPATIENT)
Dept: FAMILY MEDICINE CLINIC | Age: 31
End: 2021-02-02
Payer: COMMERCIAL

## 2021-02-02 DIAGNOSIS — M54.12 LEFT CERVICAL RADICULOPATHY: Primary | ICD-10-CM

## 2021-02-02 DIAGNOSIS — F41.9 ANXIETY: ICD-10-CM

## 2021-02-02 DIAGNOSIS — E66.9 OBESITY (BMI 30-39.9): ICD-10-CM

## 2021-02-02 PROCEDURE — 99212 OFFICE O/P EST SF 10 MIN: CPT | Performed by: FAMILY MEDICINE

## 2021-02-02 RX ORDER — GABAPENTIN 600 MG/1
TABLET ORAL
Qty: 90 TABLET | Refills: 3 | Status: SHIPPED | OUTPATIENT
Start: 2021-02-02 | End: 2021-07-28 | Stop reason: SDUPTHER

## 2021-02-02 ASSESSMENT — ENCOUNTER SYMPTOMS
COUGH: 0
SHORTNESS OF BREATH: 0
ABDOMINAL PAIN: 1
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
## 2021-02-03 ENCOUNTER — OFFICE VISIT (OUTPATIENT)
Dept: ENT CLINIC | Age: 31
End: 2021-02-03
Payer: COMMERCIAL

## 2021-02-03 VITALS
TEMPERATURE: 97.7 F | WEIGHT: 234.4 LBS | BODY MASS INDEX: 36.79 KG/M2 | DIASTOLIC BLOOD PRESSURE: 68 MMHG | HEIGHT: 67 IN | HEART RATE: 90 BPM | SYSTOLIC BLOOD PRESSURE: 109 MMHG

## 2021-02-03 DIAGNOSIS — M54.12 LEFT CERVICAL RADICULOPATHY: Primary | ICD-10-CM

## 2021-02-03 DIAGNOSIS — N30.10 INTERSTITIAL CYSTITIS: ICD-10-CM

## 2021-02-03 DIAGNOSIS — R22.1 NECK MASS: Primary | ICD-10-CM

## 2021-02-03 PROCEDURE — 99203 OFFICE O/P NEW LOW 30 MIN: CPT | Performed by: STUDENT IN AN ORGANIZED HEALTH CARE EDUCATION/TRAINING PROGRAM

## 2021-02-03 ASSESSMENT — ENCOUNTER SYMPTOMS
RHINORRHEA: 0
VOMITING: 0
NAUSEA: 0
SHORTNESS OF BREATH: 0
COUGH: 0
EYE PAIN: 0

## 2021-02-03 NOTE — PROGRESS NOTES
[facility]      Patient Name: June Sawant 89 Record Number:  [id number]  Primary Care Physician:  Ann Shelton DO  Date of Consultation: 2/3/2021    Chief Complaint:   Chief Complaint   Patient presents with    Other     Has a swollen lymph node on right side of neck, has been swollen for about 6 months. States she has had a chronically \"sore/scratchy\" throat for about a month        HISTORY OF PRESENT ILLNESS  Jane Foreman is a(n) 31 y.o. female who presents with right-sided neck swelling for approximately 6 months. She feels that it has slowly enlarged. It is tender to touch. There is no difficulty with breathing or swallowing. She does not have a smoking history and is not a current smoker. She has no family history of lymphomas or leukemias. She does admit to fatigue however she denies any night sweats. She has lost approximately 40 pounds in last couple of weeks but states that she has been eating healthier and trying to lose weight. She also has a sore throat that has been present for about a month. She denies any reflux symptoms. She denies any hoarseness, globus sensation or cough. Patient Active Problem List   Diagnosis    Insomnia    Migraine with aura    Pelvic peritoneal endometriosis    Dysmenorrhea    Family history of ischemic heart disease    Obesity (BMI 30-39. 9)    History of ovarian cyst    Interstitial cystitis    Vitamin D deficiency    Depression    Snoring    Chronic fatigue    Palpitations    Precordial pain    CRP elevated    Arthralgia of shoulder region    Protruded cervical disc    Neck pain    Abscess of right breast    Left renal mass     Past Surgical History:   Procedure Laterality Date    APPENDECTOMY  2005    BREAST SURGERY Right 8/29/2019    EXPLORATION AND DEBRIDEMENT CHRONIC RIGHT BREAST ABSCESS performed by Pablo Navarro MD at [place] [address]  8/2012 endometrosis    COLONOSCOPY      DILATION AND CURETTAGE OF UTERUS N/A 6/25/2020    DIAGNOSTIC LAPAROSCOPY,  DILATATION AND CURETTAGE, HYSTEROSCOPY  CPT CODE - 29717 performed by Kelsey Tee MD at [address] [facility]  8/27/2013    Laparoscopy with ablation of endometriotic foci    LAPAROSCOPY N/A 11/2/2020    ROBOTIC ASSISTED LAPAROSCOPIC LYSIS OF ADHESIONS, EXCISION OF ENDOMETRIOSIS, ABLATION OF ENDOMETRIOSIS, AND CHROMOPERTUBATION performed by Kelsey Tee MD at [address]  2019    DEBRIDEMENT AND EXPLORATION OF CHRONIC RIGHT BREAST ABSCESS X2    PARTIAL NEPHRECTOMY Left 11/19/2019    LEFT DAVINCI PARTIAL NEPHRECTOMY performed by Matthieu Fletcher MD at [address]     Family History   Problem Relation Age of Onset    Cancer Mother         lung, cervical    High Blood Pressure Mother     Asthma Mother     Other Mother         chf,MI at 27    Depression Father     Diabetes Father     Bipolar Disorder Father     Heart Disease Father         CABG at 64    Other Sister         DVT during pregnancy    Other Maternal Aunt         DVT/PE    Lupus Maternal Aunt     Diabetes Paternal Grandmother      Social History     Socioeconomic History    Marital status:      Spouse name: Giuseppe De La Torre Number of children: 0    Years of education: Not on file    Highest education level: Not on file   Occupational History    Occupation: Medical Assistant     Comment: [employer]   Social Needs    Financial resource strain: Not on file    Food insecurity     Worry: Not on file     Inability: Not on file   [employer] needs     Medical: Not on file     Non-medical: Not on file   Tobacco Use    Smoking status: Never Smoker    Smokeless tobacco: Never Used   Substance and Sexual Activity    Alcohol use: No     Alcohol/week: 0.0 standard drinks    Drug use: No    Sexual activity: Yes     Partners: Male   Lifestyle  Physical activity     Days per week: Not on file     Minutes per session: Not on file    Stress: Not on file   Relationships    Social connections     Talks on phone: Not on file     Gets together: Not on file     Attends [employer] service: Not on file     Active member of club or organization: Not on file     Attends meetings of clubs or organizations: Not on file     Relationship status: Not on file    Intimate partner violence     Fear of current or ex partner: Not on file     Emotionally abused: Not on file     Physically abused: Not on file     Forced sexual activity: Not on file   Other Topics Concern    Not on file   Social History Narrative    Not on file       DRUG/FOOD ALLERGIES: Ambien [zolpidem tartrate], Keflex [cephalexin], Reglan [metoclopramide], Ceclor [cefaclor], and Dermabond    CURRENT MEDICATIONS  Prior to Admission medications    Medication Sig Start Date End Date Taking? Authorizing Provider   gabapentin (NEURONTIN) 600 MG tablet 1 tid 2/2/21 3/2/21 Yes Marc Verdin DO   ibuprofen (ADVIL;MOTRIN) 600 MG tablet Take 1 tablet by mouth every 6 hours as needed for Pain 11/2/20  Yes Shimon Madden MD       REVIEW OF SYSTEMS  The following systems were reviewed and revealed the following in addition to any already discussed in the HPI:    Review of Systems   Constitutional: Negative for fatigue and fever. HENT: Negative for congestion, ear pain, postnasal drip, rhinorrhea and sneezing. Neck mass had neck pain   Eyes: Negative for pain and visual disturbance. Respiratory: Negative for cough and shortness of breath. Cardiovascular: Negative for chest pain. Gastrointestinal: Negative for nausea and vomiting. Endocrine: Negative. Genitourinary: Negative. Musculoskeletal: Negative for neck pain and neck stiffness. Skin: Negative for rash. Neurological: Negative for dizziness and headaches.           PHYSICAL EXAM /68 (Site: Left Upper Arm, Position: Sitting, Cuff Size: Large Adult)   Pulse 90   Temp 97.7 °F (36.5 °C) (Infrared)   Ht 5' 7\" (1.702 m)   Wt 234 lb 6.4 oz (106.3 kg)   BMI 36.71 kg/m²     GENERAL: No Acute Distress, Alert and Oriented, no hoarseness  EYES: EOMI, Anti-icteric  NOSE: No epistaxis, nasal mucosa within normal limits, no purulent drainage  EARS: Normal external canal appearance, EAC patent bilaterally, EMs intact bilaterally  FACE: 1/6 House-Brackmann Scale, symmetric, sensation equal bilaterally  ORAL CAVITY: No masses or lesions palpated, uvula is midline, moist mucous membranes,   NECK: Normal range of motion, no thyromegaly, trachea is midline, no lymphadenopathy, no neck masses, no crepitus  CHEST: Normal respiratory effort, no retractions, breathing comfortably  SKIN: No rashes, normal appearing skin, no evidence of skin lesions/tumors    RADIOLOGY  Summary of findings:      PROCEDURE      ASSESSMENT/PLAN  Liborio Oliver is a very pleasant 31 y.o. female with     1. Neck mass  - CT SOFT TISSUE NECK W CONTRAST; Future    I do not appreciate any neck masses on physical exam.  However, she feels the masses underneath her SCM so it may not be palpable. I offered a CT scan that will evaluate her neck for any abnormalities. She would like to pursue this to rule out any masses or cysts present. She will get the CT scan and I will call her with the results. Medical Decision Making:   The following items were considered in medical decision making:  Independent review of images  Review / order clinical lab tests  Review / order radiology tests  Decision to obtain old records

## 2021-02-08 ENCOUNTER — HOSPITAL ENCOUNTER (OUTPATIENT)
Age: 31
Discharge: HOME | End: 2021-02-08
Payer: COMMERCIAL

## 2021-02-08 LAB
ANION GAP SERPL CALCULATED.3IONS-SCNC: 10 MMOL/L (ref 3–16)
BUN BLDV-MCNC: 10 MG/DL (ref 7–20)
CALCIUM SERPL-MCNC: 9.2 MG/DL (ref 8.3–10.6)
CHLORIDE BLD-SCNC: 104 MMOL/L (ref 99–110)
CO2: 23 MMOL/L (ref 21–32)
CREAT SERPL-MCNC: 0.7 MG/DL (ref 0.6–1.1)
GFR AFRICAN AMERICAN: >60
GFR NON-AFRICAN AMERICAN: >60
GLUCOSE BLD-MCNC: 81 MG/DL (ref 70–99)
POTASSIUM SERPL-SCNC: 4.2 MMOL/L (ref 3.5–5.1)
SODIUM BLD-SCNC: 137 MMOL/L (ref 136–145)

## 2021-02-08 PROCEDURE — 36415 COLL VENOUS BLD VENIPUNCTURE: CPT

## 2021-02-08 PROCEDURE — 80048 BASIC METABOLIC PNL TOTAL CA: CPT

## 2021-02-09 ENCOUNTER — HOSPITAL ENCOUNTER (OUTPATIENT)
Dept: CT IMAGING | Age: 31
Discharge: HOME | End: 2021-02-09
Payer: COMMERCIAL

## 2021-02-09 ENCOUNTER — TELEPHONE (OUTPATIENT)
Dept: ENT CLINIC | Age: 31
End: 2021-02-09

## 2021-02-09 DIAGNOSIS — R22.1 NECK MASS: ICD-10-CM

## 2021-02-09 PROCEDURE — 70491 CT SOFT TISSUE NECK W/DYE: CPT

## 2021-02-09 PROCEDURE — 6360000004 HC RX CONTRAST MEDICATION: Performed by: STUDENT IN AN ORGANIZED HEALTH CARE EDUCATION/TRAINING PROGRAM

## 2021-02-09 RX ADMIN — IOPAMIDOL 75 ML: 755 INJECTION, SOLUTION INTRAVENOUS at 08:02

## 2021-05-04 ENCOUNTER — E-VISIT (OUTPATIENT)
Dept: FAMILY MEDICINE CLINIC | Age: 31
End: 2021-05-04
Payer: COMMERCIAL

## 2021-05-04 DIAGNOSIS — J01.90 ACUTE BACTERIAL SINUSITIS: Primary | ICD-10-CM

## 2021-05-04 DIAGNOSIS — B96.89 ACUTE BACTERIAL SINUSITIS: Primary | ICD-10-CM

## 2021-05-04 PROCEDURE — 99422 OL DIG E/M SVC 11-20 MIN: CPT | Performed by: FAMILY MEDICINE

## 2021-05-04 RX ORDER — NEOMYCIN SULFATE, POLYMYXIN B SULFATE, HYDROCORTISONE 3.5; 10000; 1 MG/ML; [USP'U]/ML; MG/ML
2 SOLUTION/ DROPS AURICULAR (OTIC) EVERY 8 HOURS SCHEDULED
Qty: 10 ML | Refills: 1 | Status: SHIPPED | OUTPATIENT
Start: 2021-05-04 | End: 2021-05-14

## 2021-05-04 RX ORDER — AZITHROMYCIN 250 MG/1
TABLET, FILM COATED ORAL
Qty: 6 TABLET | Refills: 0 | Status: SHIPPED | OUTPATIENT
Start: 2021-05-04 | End: 2021-05-09

## 2021-05-04 ASSESSMENT — LIFESTYLE VARIABLES: SMOKING_STATUS: NO, I'VE NEVER SMOKED

## 2021-05-04 NOTE — PROGRESS NOTES
HPI: per patient's questionnaire    EXAM: not applicable    Diagnoses and all orders for this visit:    1. Acute bacterial sinusitis  worsening  We will advise her to get tested for COVID-19 due to ongoing pandemic  - azithromycin (ZITHROMAX) 250 MG tablet; 500 mg orally on day one followed by 250 mg daily on days two through five  Dispense: 6 tablet; Refill: 0  - neomycin-polymyxin-hydrocortisone 1 % SOLN otic solution; Place 2 drops into the right ear every 8 hours for 10 days OK to substitute  Dispense: 10 mL; Refill: 1    I suggested her to get tested for COVID-19 due to ongoing pandemic    We can give her a letter for work   if [employer] is needed patient was advised to make appointment with her PCP    No orders of the defined types were placed in this encounter. Patient was advised to contact PCP if symptoms worsen or failing to change as expected        11-20 minutes were spent on the digital evaluation and management of this patient.

## 2021-05-12 ENCOUNTER — HOSPITAL ENCOUNTER (EMERGENCY)
Age: 31
Discharge: HOME | End: 2021-05-12
Attending: EMERGENCY MEDICINE
Payer: COMMERCIAL

## 2021-05-12 ENCOUNTER — NURSE TRIAGE (OUTPATIENT)
Dept: OTHER | Facility: CLINIC | Age: 31
End: 2021-05-12

## 2021-05-12 ENCOUNTER — APPOINTMENT (OUTPATIENT)
Dept: GENERAL RADIOLOGY | Age: 31
End: 2021-05-12
Payer: COMMERCIAL

## 2021-05-12 VITALS
WEIGHT: 230 LBS | BODY MASS INDEX: 36.1 KG/M2 | OXYGEN SATURATION: 98 % | HEART RATE: 88 BPM | RESPIRATION RATE: 17 BRPM | SYSTOLIC BLOOD PRESSURE: 141 MMHG | DIASTOLIC BLOOD PRESSURE: 80 MMHG | HEIGHT: 67 IN | TEMPERATURE: 98.9 F

## 2021-05-12 DIAGNOSIS — U07.1 COVID-19: Primary | ICD-10-CM

## 2021-05-12 LAB
A/G RATIO: 1.5 (ref 1.1–2.2)
ALBUMIN SERPL-MCNC: 4.2 G/DL (ref 3.4–5)
ALP BLD-CCNC: 91 U/L (ref 40–129)
ALT SERPL-CCNC: 18 U/L (ref 10–40)
ANION GAP SERPL CALCULATED.3IONS-SCNC: 6 MMOL/L (ref 3–16)
AST SERPL-CCNC: 16 U/L (ref 15–37)
BASOPHILS ABSOLUTE: 0 K/UL (ref 0–0.2)
BASOPHILS RELATIVE PERCENT: 0.8 %
BILIRUB SERPL-MCNC: 0.3 MG/DL (ref 0–1)
BILIRUBIN URINE: NEGATIVE
BLOOD, URINE: ABNORMAL
BUN BLDV-MCNC: 10 MG/DL (ref 7–20)
CALCIUM SERPL-MCNC: 9 MG/DL (ref 8.3–10.6)
CHLORIDE BLD-SCNC: 103 MMOL/L (ref 99–110)
CLARITY: CLEAR
CO2: 27 MMOL/L (ref 21–32)
COLOR: YELLOW
CREAT SERPL-MCNC: 0.7 MG/DL (ref 0.6–1.1)
EKG ATRIAL RATE: 82 BPM
EKG DIAGNOSIS: NORMAL
EKG P AXIS: 32 DEGREES
EKG P-R INTERVAL: 148 MS
EKG Q-T INTERVAL: 354 MS
EKG QRS DURATION: 98 MS
EKG QTC CALCULATION (BAZETT): 413 MS
EKG R AXIS: 31 DEGREES
EKG T AXIS: 26 DEGREES
EKG VENTRICULAR RATE: 82 BPM
EOSINOPHILS ABSOLUTE: 0 K/UL (ref 0–0.6)
EOSINOPHILS RELATIVE PERCENT: 1.7 %
EPITHELIAL CELLS, UA: ABNORMAL /HPF (ref 0–5)
GFR AFRICAN AMERICAN: >60
GFR NON-AFRICAN AMERICAN: >60
GLOBULIN: 2.8 G/DL
GLUCOSE BLD-MCNC: 102 MG/DL (ref 70–99)
GLUCOSE URINE: NEGATIVE MG/DL
HCG(URINE) PREGNANCY TEST: NEGATIVE
HCT VFR BLD CALC: 34.4 % (ref 36–48)
HEMOGLOBIN: 11.7 G/DL (ref 12–16)
KETONES, URINE: NEGATIVE MG/DL
LEUKOCYTE ESTERASE, URINE: NEGATIVE
LYMPHOCYTES ABSOLUTE: 0.6 K/UL (ref 1–5.1)
LYMPHOCYTES RELATIVE PERCENT: 21.3 %
MCH RBC QN AUTO: 28.1 PG (ref 26–34)
MCHC RBC AUTO-ENTMCNC: 34.2 G/DL (ref 31–36)
MCV RBC AUTO: 82.2 FL (ref 80–100)
MICROSCOPIC EXAMINATION: YES
MONOCYTES ABSOLUTE: 0.4 K/UL (ref 0–1.3)
MONOCYTES RELATIVE PERCENT: 13.4 %
NEUTROPHILS ABSOLUTE: 1.8 K/UL (ref 1.7–7.7)
NEUTROPHILS RELATIVE PERCENT: 62.8 %
NITRITE, URINE: NEGATIVE
PDW BLD-RTO: 14.2 % (ref 12.4–15.4)
PH UA: 7 (ref 5–8)
PLATELET # BLD: 214 K/UL (ref 135–450)
PMV BLD AUTO: 8.6 FL (ref 5–10.5)
POTASSIUM SERPL-SCNC: 3.8 MMOL/L (ref 3.5–5.1)
PROTEIN UA: NEGATIVE MG/DL
RAPID INFLUENZA  B AGN: NEGATIVE
RAPID INFLUENZA A AGN: NEGATIVE
RBC # BLD: 4.18 M/UL (ref 4–5.2)
RBC UA: ABNORMAL /HPF (ref 0–4)
SARS-COV-2, NAAT: DETECTED
SODIUM BLD-SCNC: 136 MMOL/L (ref 136–145)
SPECIFIC GRAVITY UA: 1.01 (ref 1–1.03)
TOTAL PROTEIN: 7 G/DL (ref 6.4–8.2)
URINE TYPE: ABNORMAL
UROBILINOGEN, URINE: 0.2 E.U./DL
WBC # BLD: 2.9 K/UL (ref 4–11)
WBC UA: ABNORMAL /HPF (ref 0–5)

## 2021-05-12 PROCEDURE — 99284 EMERGENCY DEPT VISIT MOD MDM: CPT

## 2021-05-12 PROCEDURE — 85025 COMPLETE CBC W/AUTO DIFF WBC: CPT

## 2021-05-12 PROCEDURE — 81001 URINALYSIS AUTO W/SCOPE: CPT

## 2021-05-12 PROCEDURE — 80053 COMPREHEN METABOLIC PANEL: CPT

## 2021-05-12 PROCEDURE — 71045 X-RAY EXAM CHEST 1 VIEW: CPT

## 2021-05-12 PROCEDURE — 93005 ELECTROCARDIOGRAM TRACING: CPT | Performed by: EMERGENCY MEDICINE

## 2021-05-12 PROCEDURE — 87635 SARS-COV-2 COVID-19 AMP PRB: CPT

## 2021-05-12 PROCEDURE — 84703 CHORIONIC GONADOTROPIN ASSAY: CPT

## 2021-05-12 PROCEDURE — 6370000000 HC RX 637 (ALT 250 FOR IP): Performed by: EMERGENCY MEDICINE

## 2021-05-12 PROCEDURE — 87804 INFLUENZA ASSAY W/OPTIC: CPT

## 2021-05-12 PROCEDURE — 93010 ELECTROCARDIOGRAM REPORT: CPT | Performed by: INTERNAL MEDICINE

## 2021-05-12 RX ORDER — ACETAMINOPHEN 325 MG/1
650 TABLET ORAL ONCE
Status: COMPLETED | OUTPATIENT
Start: 2021-05-12 | End: 2021-05-12

## 2021-05-12 RX ADMIN — ACETAMINOPHEN 650 MG: 325 TABLET ORAL at 08:46

## 2021-05-12 ASSESSMENT — PAIN SCALES - GENERAL
PAINLEVEL_OUTOF10: 6
PAINLEVEL_OUTOF10: 6

## 2021-05-12 NOTE — ED PROVIDER NOTES
CHIEF COMPLAINT  Fever (patient states she had her wisdom teeth taken out 2 weeks ago, symptoms started with facial pain. Patient states yesterday fever of 12. Last dose of Tylenol \"in the middle of the night\") and Chest Pain (patient c/o burning in the chest when taking a deep breath.)      HISTORY OF PRESENT ILLNESS  Keri Torres  is a 27 y.o. female who presents to the ED at via private vehicle complaining of fever. Patient states that she had her wisdom teeth taken out 2 weeks ago. She was seen and evaluated by her oral surgeon afterwards and had normal postoperative changes. Patient states that she then began feeling facial fullness/ear fullness and discomfort as well as upper respiratory congestion. Occasional nonproductive cough has been noted. Patient reports diffuse body aches and had a noted temperature of 101 °F last night. She took Tylenol for her fever       There are no other complaints, modifying factors or associated symptoms. Nursing notes reviewed. Past medical history:  has a past medical history of Anemia, Anesthesia complication, Anxiety (5/25/6348), Asthma, Cervical strain (9/8/2014), Depression, Drug effect, Endometriosis, Infertility, female, Irritable bowel syndrome, Migraine, Nausea & vomiting, Obesity (BMI 30-39.9) (5/18/2014), Ovarian cyst, PONV (postoperative nausea and vomiting), Prolonged emergence from general anesthesia, Rotator cuff tendonitis (2/11/2016), Stress incontinence, and Unspecified sleep apnea. Past surgical history:  has a past surgical history that includes Tonsillectomy; Appendectomy (2005); Tonsillectomy and adenoidectomy (1993); laparoscopy (8/27/2013); Cholecystectomy (8/2012); other surgical history (2019); Breast surgery (Right, 8/29/2019); Colonoscopy; partial nephrectomy (Left, 11/19/2019); Dilation and curettage of uterus (N/A, 6/25/2020); and laparoscopy (N/A, 11/2/2020).     Home medications:   Prior to Admission medications Medication Sig Start Date End Date Taking? Authorizing Provider   neomycin-polymyxin-hydrocortisone 1 % SOLN otic solution Place 2 drops into the right ear every 8 hours for 10 days OK to substitute 5/4/21 5/14/21  Verito Wheeler MD   gabapentin (NEURONTIN) 600 MG tablet 1 tid 2/2/21 3/2/21  Marc Verdin DO   ibuprofen (ADVIL;MOTRIN) 600 MG tablet Take 1 tablet by mouth every 6 hours as needed for Pain 11/2/20   Chris Aguilar MD       Allergies   Allergen Reactions    Ambien [Zolpidem Tartrate]      nightmares    Keflex [Cephalexin] Diarrhea and Nausea Only    Reglan [Metoclopramide]      Legs numb    Ceclor [Cefaclor] Rash    Dermabond Rash       Social history:  reports that she has never smoked. She has never used smokeless tobacco. She reports that she does not drink alcohol or use drugs. Family history:    Family History   Problem Relation Age of Onset    Cancer Mother         lung, cervical    High Blood Pressure Mother    [employer] Asthma Mother     Other Mother         chf,MI at 27    Depression Father     Diabetes Father     Bipolar Disorder Father     Heart Disease Father         CABG at 59    Other Sister         DVT during pregnancy    Other Maternal Aunt         DVT/PE    Lupus Maternal Aunt     Diabetes Paternal Grandmother        REVIEW OF SYSTEMS  6 systems reviewed, pertinent positives per HPI otherwise noted to be negative    PHYSICAL EXAM  Vitals:    05/12/21 0709   BP: (!) 143/80   Pulse: 88   Resp: 17   Temp: 98.9 °F (37.2 °C)   SpO2: 98%       GENERAL: Patient is well-developed, well-nourished,  no acute distress. Mild to moderate apparent discomfort. Non toxic appearing. HEENT:  Normocephalic, atraumatic. PERRL. Conjunctiva appear normal.  External ears are normal.  MMM. Posterior oropharynx within normal limits. No erythema, or exudate. NECK: Supple with normal ROM. Trachea midline. Negative Kernig's and Burzynski signs. LUNGS:  Normal work of breathing. Speaking comfortably in full sentences. No wheezes, rales, rhonchi. Normal work of breathing. EXTREMITIES: 2+ distal pulses w/o edema. MUSCULOSKELETAL:  Atraumatic extremities with normal ROM grossly. No obvious bony deformities. SKIN: Warm/dry. No rashes/lesions noted. PSYCHIATRIC: Patient is alert and oriented with normal affect  NEUROLOGIC: Cranial nerves grossly intact. Moves all extremities with equal strength. No gross sensory deficits. Answers questions/follows commands appropriately. ED COURSE/MDM  Nursing notes reviewed. Pt was given the following medications or treatments in the ED:       EKG: Normal sinus rhythm with a rate of 82. Normal axis. Normal intervals and durations. No ST or T wave changes appreciated. No acute signs of ischemia. No change compared to previous EKG on 3/4/2018    Clinical Impression  Based on the presenting complaint, history, and physical exam, multiple diagnoses were considered. Exam and workup here most c/w:  1. COVID-19        I discussed with Rosalba Loza the results of evaluation in the ED, diagnosis, care, and prognosis. The plan is to discharge to home. Patient is in agreement with plan and questions have been answered. I also discussed with Rosalba Loza the reasons which may require a return visit and the importance of follow-up care. The patient is well-appearing, nontoxic, and improved at the time of discharge. Patient agrees to call to arrange follow-up care as directed. Rosalba Loza understands to return immediately for worsening/change in symptoms. Patient will be started on the following medications from the ED:  Discharge Medication List as of 5/12/2021  9:30 AM            Disposition  Pt is discharged in stable condition.     Disposition Vitals:  BP (!) 143/80   Pulse 88   Temp 98.9 °F (37.2 °C) (Oral)   Resp 17   Ht 5' 7\" (1.702 m)   Wt 230 lb (104.3 kg)   LMP 05/09/2021 (Exact Date)   SpO2 98%   BMI 36.02 kg/m²                    Shala Anthony, DO  05/17/21 2924 [facility], DO  07/28/21 1011

## 2021-05-12 NOTE — TELEPHONE ENCOUNTER
Reason for Disposition   MILD difficulty breathing (e.g., minimal/no SOB at rest, SOB with walking, pulse <100)    Answer Assessment - Initial Assessment Questions  1. COVID-19 DIAGNOSIS: \"Who made your Coronavirus (COVID-19) diagnosis? \" \"Was it confirmed by a positive lab test?\" If not diagnosed by a HCP, ask \"Are there lots of cases (community spread) where you live? \" (See [facility] health department website, if unsure)      [facility],   2. COVID-19 EXPOSURE: \"Was there any known exposure to COVID before the symptoms began? \" [facility] Definition of close contact: within 6 feet (2 meters) for a total of 15 minutes or more over a 24-hour period. yes  3. ONSET: \"When did the COVID-19 symptoms start? \"       5/11/2021  4. WORST SYMPTOM: \"What is your worst symptom? \" (e.g., cough, fever, shortness of breath, muscle aches)      Burning when she breaths and headache  5. COUGH: \"Do you have a cough? \" If so, ask: \"How bad is the cough? \"        Yes- mild  6. FEVER: \"Do you have a fever? \" If so, ask: \"What is your temperature, how was it measured, and when did it start? \"      102.0 oral 0615  7. RESPIRATORY STATUS: \"Describe your breathing? \" (e.g., shortness of breath, wheezing, unable to speak)       SOB even when still, cant get a full breath and it burns to breath  8. BETTER-SAME-WORSE: [place] you getting better, staying the same or getting worse compared to yesterday? \"  If getting worse, ask, \"In what way? \"      worse  9. HIGH RISK DISEASE: \"Do you have any chronic medical problems? \" (e.g., asthma, heart or lung disease, weak immune system, obesity, etc.)      Asthma  10. PREGNANCY: \"Is there any chance you are pregnant? \" \"When was your last menstrual period? \"        no  11. OTHER SYMPTOMS: \"Do you have any other symptoms? \"  (e.g., chills, fatigue, headache, loss of smell or taste, muscle pain, sore throat; new loss of smell or taste especially support the diagnosis of COVID-19)        Sore throat, muscle aches, headache, fever, cough, runny nose, stuffy nose, fatigue and SOB    Protocols used: CORONAVIRUS (COVID-19) DIAGNOSED OR SUSPECTED-ADULT-    Brief description of triage: Bety Busby states she started with s/s 05/11/2021 they are  Sore throat, muscle aches, headache, fever, cough, runny nose, stuffy nose, fatigue and SOB. Caller states she also has some chest pressure and burning when she breaths. Caller states she has controlled asthma and works in a [employer] which could expose her to [employer]. Triage indicates for patient to Go to ED Now. Care advice provided, patient verbalizes understanding; denies any other questions or concerns; instructed to call back for any new or worsening symptoms. Attention Provider: Thank you for allowing me to participate in the care of your patient. The patient was connected to triage in response to symptoms provided. Please do not respond through this encounter as the response is not directed to a shared pool.

## 2021-05-13 ENCOUNTER — CARE COORDINATION (OUTPATIENT)
Dept: OTHER | Facility: CLINIC | Age: 31
End: 2021-05-13

## 2021-05-17 ENCOUNTER — CARE COORDINATION (OUTPATIENT)
Dept: OTHER | Facility: CLINIC | Age: 31
End: 2021-05-17

## 2021-05-17 ENCOUNTER — TELEPHONE (OUTPATIENT)
Dept: INTERNAL MEDICINE CLINIC | Age: 31
End: 2021-05-17

## 2021-05-17 NOTE — CARE COORDINATION
[facility] ED Follow Up Call    2021    Patient: Feliciano Wills Patient : 1990   MRN: [id number]  Reason for Admission: COVID 19  Discharge Date: 21    Patient contacted regarding COVID-19 diagnosis and pulse oximeter ordered at discharge. Discussed COVID-19 related testing which was available at this time. Test results were positive. Patient informed of results, if available? Yes    Ambulatory Care Manager contacted the patient by telephone to perform follow-up assessment. Verified name and  with patient as identifiers. Patient has following risk factors of: no known risk factors. Symptoms reviewed with patient who verbalized the following symptoms: cough, loss of taste or smell, no new symptoms, no worsening symptoms and Patient reports ongoing mild cough and continued loss of smell, she reports no fever and reports all other symptoms have resolved. She has a VV for return to work assessment today at [place] [address] with plans to return to work Tomorrow. She dnies any additional questions, concerns, or needs today. .   Due to no new or worsening symptoms encounter was not routed to provider for escalation. Educated patient about risk for severe COVID-19 due to risk factors according to CDC guidelines. ACM reviewed discharge instructions, medical action plan and red flag symptoms patient who verbalized understanding. Discussed COVID vaccination status No. Education provided on COVID-19 vaccination as appropriate. Discussed exposure protocols and quarantine with CDC Guidelines. Patient was given an opportunity to verbalize any questions and concerns and agrees to contact ACM or health care provider for questions related to their healthcare. Was patient discharged with a pulse oximeter? No and had one at home. Discussed and confirmed pulse oximeter discharge instructions and when to notify provider or seek emergency care.  SPO2 average 95-96%    Goals      Conditions and Symptoms      I will follow my Zone Management tool to seek urgent or emergent care. I will notify my provider of any symptoms that indicate a worsening of my condition. Barriers: lack of education  Plan for overcoming my barriers: Work with ACM to better understand my Red Flags and appropriate sites of care. Confidence: 8/10  Anticipated Goal Completion Date: 6/13/21    ACM discussed the following RED FLAGS and encouraged patient to contact 911 for life threatening emergencies and PCP office 24/7 for non life-threatening symptoms. ACM also encouraged outreach to Nurse Access Line and/or ACM for assessment and intervention guidance as needed. Reminded patient of alternatives to ED such as urgent care, walk in clinics and e-visits as available. COVID Red Flags and Pulse Ox Red Flags educated and provided via [employer].  Exercise 3x per week (30 min per time)      Weight < 200 lb (90.719 kg)             ACM provided contact information. No further follow-up call identified based on severity of symptoms and risk factors.

## 2021-05-17 NOTE — TELEPHONE ENCOUNTER
Patient went to the [facility] covid clinic Thursday for a follow up to her rapid covid test completed Wednesday and is looking for her results.

## 2021-05-22 ENCOUNTER — E-VISIT (OUTPATIENT)
Dept: FAMILY MEDICINE CLINIC | Age: 31
End: 2021-05-22
Payer: COMMERCIAL

## 2021-05-22 DIAGNOSIS — R21 SKIN RASH: Primary | ICD-10-CM

## 2021-05-22 DIAGNOSIS — U07.1 COVID-19: ICD-10-CM

## 2021-05-22 PROCEDURE — 98970 NQHP OL DIG ASSMT&MGMT 5-10: CPT | Performed by: NURSE PRACTITIONER

## 2021-05-22 RX ORDER — PREDNISONE 20 MG/1
20 TABLET ORAL 2 TIMES DAILY
Qty: 10 TABLET | Refills: 0 | Status: SHIPPED | OUTPATIENT
Start: 2021-05-22 | End: 2021-05-27

## 2021-06-07 RX ORDER — LEVONORGESTREL AND ETHINYL ESTRADIOL 0.15-0.03
1 KIT ORAL DAILY
Qty: 1 PACKET | Refills: 1 | Status: SHIPPED | OUTPATIENT
Start: 2021-06-07 | End: 2022-05-17

## 2021-06-09 ENCOUNTER — E-VISIT (OUTPATIENT)
Dept: FAMILY MEDICINE CLINIC | Age: 31
End: 2021-06-09
Payer: COMMERCIAL

## 2021-06-09 DIAGNOSIS — K08.89 PAIN, DENTAL: Primary | ICD-10-CM

## 2021-06-09 DIAGNOSIS — J01.91 ACUTE RECURRENT SINUSITIS, UNSPECIFIED LOCATION: ICD-10-CM

## 2021-06-09 PROCEDURE — 99422 OL DIG E/M SVC 11-20 MIN: CPT | Performed by: NURSE PRACTITIONER

## 2021-06-09 RX ORDER — CLINDAMYCIN HYDROCHLORIDE 300 MG/1
300 CAPSULE ORAL 3 TIMES DAILY
Qty: 21 CAPSULE | Refills: 0 | Status: SHIPPED | OUTPATIENT
Start: 2021-06-09 | End: 2021-06-16

## 2021-06-09 ASSESSMENT — LIFESTYLE VARIABLES: SMOKING_STATUS: NO, I'VE NEVER SMOKED

## 2021-07-20 LAB
CHOLESTEROL, TOTAL: 135 MG/DL (ref 0–199)
GLUCOSE BLD-MCNC: 107 MG/DL (ref 70–99)
HDLC SERPL-MCNC: 35 MG/DL (ref 40–60)
LDL CHOLESTEROL CALCULATED: 89 MG/DL
TRIGL SERPL-MCNC: 53 MG/DL (ref 0–150)

## 2021-07-28 ENCOUNTER — E-VISIT (OUTPATIENT)
Dept: FAMILY MEDICINE CLINIC | Age: 31
End: 2021-07-28
Payer: COMMERCIAL

## 2021-07-28 DIAGNOSIS — H10.021 PINK EYE, RIGHT: Primary | ICD-10-CM

## 2021-07-28 PROCEDURE — 98971 NQHP OL DIG ASSMT&MGMT 11-20: CPT | Performed by: NURSE PRACTITIONER

## 2021-07-28 RX ORDER — POLYMYXIN B SULFATE AND TRIMETHOPRIM 1; 10000 MG/ML; [USP'U]/ML
1 SOLUTION OPHTHALMIC EVERY 4 HOURS
Qty: 10 ML | Refills: 0 | Status: SHIPPED | OUTPATIENT
Start: 2021-07-28 | End: 2021-08-07

## 2021-07-28 RX ORDER — GABAPENTIN 600 MG/1
TABLET ORAL
Qty: 90 TABLET | Refills: 3 | Status: SHIPPED | OUTPATIENT
Start: 2021-07-28 | End: 2021-11-16 | Stop reason: SDUPTHER

## 2021-07-28 NOTE — TELEPHONE ENCOUNTER
Refill Request     Last Seen: Last Seen Department: 2021  Last Seen by PCP: 2021    Last Written: 2021 for #90 tablet #3 refills    Next Appointment:   Future Appointments   Date Time Provider Saji Otero   2021  4:30 PM DO MICK [place] - [facility]       Future appointment scheduled      Requested Prescriptions     Pending Prescriptions Disp Refills    gabapentin (NEURONTIN) 600 MG tablet 90 tablet 3     Si tid

## 2021-08-04 ENCOUNTER — HOSPITAL ENCOUNTER (OUTPATIENT)
Dept: CT IMAGING | Age: 31
Discharge: HOME | End: 2021-08-04
Payer: COMMERCIAL

## 2021-08-04 DIAGNOSIS — R10.31 RIGHT LOWER QUADRANT ABDOMINAL PAIN: ICD-10-CM

## 2021-08-04 DIAGNOSIS — R10.9 ACUTE FLANK PAIN: Primary | ICD-10-CM

## 2021-08-04 DIAGNOSIS — R10.9 ACUTE FLANK PAIN: ICD-10-CM

## 2021-08-04 PROCEDURE — 74176 CT ABD & PELVIS W/O CONTRAST: CPT

## 2021-08-04 RX ORDER — SULFAMETHOXAZOLE AND TRIMETHOPRIM 800; 160 MG/1; MG/1
1 TABLET ORAL 2 TIMES DAILY
Qty: 20 TABLET | Refills: 0 | Status: SHIPPED | OUTPATIENT
Start: 2021-08-04 | End: 2021-11-13 | Stop reason: SDUPTHER

## 2021-08-04 RX ORDER — TAMSULOSIN HYDROCHLORIDE 0.4 MG/1
0.4 CAPSULE ORAL DAILY
Qty: 30 CAPSULE | Refills: 0 | Status: SHIPPED | OUTPATIENT
Start: 2021-08-04 | End: 2022-05-17

## 2021-08-04 NOTE — RESULT ENCOUNTER NOTE
Please call her you today and tell her the scan was negative for any problems in the abdomen at all.

## 2021-08-09 ENCOUNTER — OFFICE VISIT (OUTPATIENT)
Dept: FAMILY MEDICINE CLINIC | Age: 31
End: 2021-08-09
Payer: COMMERCIAL

## 2021-08-09 VITALS
HEART RATE: 80 BPM | OXYGEN SATURATION: 99 % | WEIGHT: 233.2 LBS | SYSTOLIC BLOOD PRESSURE: 124 MMHG | DIASTOLIC BLOOD PRESSURE: 82 MMHG | HEIGHT: 67 IN | BODY MASS INDEX: 36.6 KG/M2

## 2021-08-09 DIAGNOSIS — Z00.00 ANNUAL PHYSICAL EXAM: Primary | ICD-10-CM

## 2021-08-09 DIAGNOSIS — U09.9 POST-COVID-19 CONDITION: ICD-10-CM

## 2021-08-09 DIAGNOSIS — E03.9 HYPOTHYROIDISM, UNSPECIFIED TYPE: ICD-10-CM

## 2021-08-09 DIAGNOSIS — M25.50 ARTHRALGIA, UNSPECIFIED JOINT: ICD-10-CM

## 2021-08-09 DIAGNOSIS — E55.9 VITAMIN D DEFICIENCY: ICD-10-CM

## 2021-08-09 LAB
C-REACTIVE PROTEIN: 3.1 MG/L (ref 0–5.1)
TSH SERPL DL<=0.05 MIU/L-ACNC: 2.36 UIU/ML (ref 0.27–4.2)

## 2021-08-09 PROCEDURE — 99213 OFFICE O/P EST LOW 20 MIN: CPT | Performed by: FAMILY MEDICINE

## 2021-08-09 RX ORDER — BUPROPION HYDROCHLORIDE 150 MG/1
150 TABLET ORAL EVERY MORNING
Qty: 30 TABLET | Refills: 0 | Status: SHIPPED | OUTPATIENT
Start: 2021-08-09 | End: 2022-05-17

## 2021-08-09 ASSESSMENT — ENCOUNTER SYMPTOMS
SHORTNESS OF BREATH: 0
BLOOD IN STOOL: 0
COUGH: 0
CHEST TIGHTNESS: 0
CONSTIPATION: 0
ABDOMINAL PAIN: 0

## 2021-08-09 NOTE — PATIENT INSTRUCTIONS
Annual physical exam  AGCwork on weight loss by reducing carbs and increasing activity as tolerated  Vitamin D deficiency  -     Vitamin D 25 Hydroxy  Check vitamin D today  Hypothyroidism, unspecified type  -     TSH without Reflex  Check TSH today  Arthralgia, unspecified joint  -     Sedimentation rate, automated;  Future  -     C-reactive protein  Check inflammatory test today    Depressionprescription sent for Wellbutrin 150 daily let me know in about 3 weeks if the dose needs to be increased     Marc Verdin, DO
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
## 2021-08-10 DIAGNOSIS — Z00.00 ANNUAL PHYSICAL EXAM: Primary | ICD-10-CM

## 2021-08-10 LAB
SEDIMENTATION RATE, ERYTHROCYTE: 11 MM/HR (ref 0–20)
VITAMIN D 25-HYDROXY: 34.5 NG/ML

## 2021-08-11 LAB
A/G RATIO: 1.4 (ref 1.1–2.2)
ALBUMIN SERPL-MCNC: 4.3 G/DL (ref 3.4–5)
ALP BLD-CCNC: 106 U/L (ref 40–129)
ALT SERPL-CCNC: 20 U/L (ref 10–40)
ANION GAP SERPL CALCULATED.3IONS-SCNC: 18 MMOL/L (ref 3–16)
AST SERPL-CCNC: 22 U/L (ref 15–37)
BASOPHILS ABSOLUTE: 0 K/UL (ref 0–0.2)
BASOPHILS RELATIVE PERCENT: 0.5 %
BILIRUB SERPL-MCNC: <0.2 MG/DL (ref 0–1)
BUN BLDV-MCNC: 14 MG/DL (ref 7–20)
CALCIUM SERPL-MCNC: 9.2 MG/DL (ref 8.3–10.6)
CHLORIDE BLD-SCNC: 96 MMOL/L (ref 99–110)
CO2: 23 MMOL/L (ref 21–32)
CREAT SERPL-MCNC: 0.7 MG/DL (ref 0.6–1.1)
EOSINOPHILS ABSOLUTE: 0.2 K/UL (ref 0–0.6)
EOSINOPHILS RELATIVE PERCENT: 2.4 %
GFR AFRICAN AMERICAN: >60
GFR NON-AFRICAN AMERICAN: >60
GLOBULIN: 3.1 G/DL
GLUCOSE BLD-MCNC: 77 MG/DL (ref 70–99)
HCT VFR BLD CALC: 39.3 % (ref 36–48)
HEMOGLOBIN: 13 G/DL (ref 12–16)
LYMPHOCYTES ABSOLUTE: 1.9 K/UL (ref 1–5.1)
LYMPHOCYTES RELATIVE PERCENT: 26.8 %
MCH RBC QN AUTO: 29 PG (ref 26–34)
MCHC RBC AUTO-ENTMCNC: 33.1 G/DL (ref 31–36)
MCV RBC AUTO: 87.6 FL (ref 80–100)
MONOCYTES ABSOLUTE: 0.2 K/UL (ref 0–1.3)
MONOCYTES RELATIVE PERCENT: 3.4 %
NEUTROPHILS ABSOLUTE: 4.8 K/UL (ref 1.7–7.7)
NEUTROPHILS RELATIVE PERCENT: 66.9 %
PDW BLD-RTO: 14.6 % (ref 12.4–15.4)
PLATELET # BLD: 310 K/UL (ref 135–450)
PMV BLD AUTO: 8.9 FL (ref 5–10.5)
POTASSIUM SERPL-SCNC: 4.6 MMOL/L (ref 3.5–5.1)
RBC # BLD: 4.49 M/UL (ref 4–5.2)
SODIUM BLD-SCNC: 137 MMOL/L (ref 136–145)
TOTAL PROTEIN: 7.4 G/DL (ref 6.4–8.2)
WBC # BLD: 7.1 K/UL (ref 4–11)

## 2021-10-27 ENCOUNTER — E-VISIT (OUTPATIENT)
Dept: PRIMARY CARE CLINIC | Age: 31
End: 2021-10-27
Payer: COMMERCIAL

## 2021-10-27 DIAGNOSIS — B96.89 ACUTE BACTERIAL SINUSITIS: Primary | ICD-10-CM

## 2021-10-27 DIAGNOSIS — J01.90 ACUTE BACTERIAL SINUSITIS: Primary | ICD-10-CM

## 2021-10-27 PROCEDURE — 99422 OL DIG E/M SVC 11-20 MIN: CPT | Performed by: INTERNAL MEDICINE

## 2021-10-27 RX ORDER — TRIAMCINOLONE ACETONIDE 55 UG/1
2 SPRAY, METERED NASAL DAILY
Qty: 16.5 G | Refills: 0 | Status: SHIPPED | OUTPATIENT
Start: 2021-10-27 | End: 2022-05-17

## 2021-10-27 RX ORDER — DOXYCYCLINE HYCLATE 100 MG
100 TABLET ORAL 2 TIMES DAILY
Qty: 20 TABLET | Refills: 0 | Status: SHIPPED | OUTPATIENT
Start: 2021-10-27 | End: 2021-11-06

## 2021-10-27 ASSESSMENT — LIFESTYLE VARIABLES: SMOKING_STATUS: NO, I'VE NEVER SMOKED

## 2021-11-16 RX ORDER — GABAPENTIN 600 MG/1
TABLET ORAL
Qty: 90 TABLET | Refills: 3 | Status: SHIPPED | OUTPATIENT
Start: 2021-11-16 | End: 2022-03-10 | Stop reason: SDUPTHER

## 2021-11-16 NOTE — TELEPHONE ENCOUNTER
Refill Request - Controlled Substance    Last Seen Department: 2021  Last Seen by PCP: 2021    Last Written: 21 90 tablet 3 refill     Last UDS: n/a    Med Agreement Signed On: n/a    Next Appointment: 8/10/22       Future appointment scheduled    Requested Prescriptions     Pending Prescriptions Disp Refills    gabapentin (NEURONTIN) 600 MG tablet 90 tablet 3     Si tid

## 2021-11-17 ENCOUNTER — TELEPHONE (OUTPATIENT)
Dept: FAMILY MEDICINE CLINIC | Age: 31
End: 2021-11-17

## 2021-11-17 NOTE — TELEPHONE ENCOUNTER
Patient complains of having sinus pressure & chest pressure, no wheezing, but lungs feel like they are burning. O2 sats are normal but can't walk short distances without breaking out in cold sweats. Patient is concerned for pneumonia & would like VV ASAP.

## 2021-11-17 NOTE — TELEPHONE ENCOUNTER
Patient was informed, that a covid test would be recommended if unable to schedule to rule it out. Patient states that she has had some relief with mucinex & will continue to monitor & if no improvement this week will go to urgent care. Patient unavailable to VV in the afternoon today.

## 2022-01-02 ENCOUNTER — E-VISIT (OUTPATIENT)
Dept: PRIMARY CARE CLINIC | Age: 32
End: 2022-01-02
Payer: COMMERCIAL

## 2022-01-02 DIAGNOSIS — R30.0 DYSURIA: Primary | ICD-10-CM

## 2022-01-02 PROCEDURE — 99422 OL DIG E/M SVC 11-20 MIN: CPT | Performed by: NURSE PRACTITIONER

## 2022-01-02 RX ORDER — NITROFURANTOIN 25; 75 MG/1; MG/1
100 CAPSULE ORAL 2 TIMES DAILY
Qty: 14 CAPSULE | Refills: 0 | Status: SHIPPED | OUTPATIENT
Start: 2022-01-02 | End: 2022-01-09

## 2022-01-02 NOTE — PATIENT INSTRUCTIONS
Patient Education        Painful Urination (Dysuria): Care Instructions  Your Care Instructions  Burning pain with urination (dysuria) is a common symptom of a urinary tract infection or other urinary problems. The bladder may become inflamed. This can cause pain when the bladder fills and empties. You may also feel pain if the tube that carries urine from the bladder to the outside of the body (urethra) gets irritated or infected. Sexually transmitted infections (STIs) also may cause pain when you urinate. Sometimes the pain can be caused by things other than an infection. The urethra can be irritated by soaps, perfumes, or foreign objects in the urethra. Kidney stones can cause pain when they pass through the urethra. The cause may be hard to find. You may need tests. Treatment for painful urination depends on the cause. Follow-up care is a key part of your treatment and safety. Be sure to make and go to all appointments, and call your doctor if you are having problems. It's also a good idea to know your test results and keep a list of the medicines you take. How can you care for yourself at home? · Drink extra water for the next day or two. This will help make the urine less concentrated. (If you have kidney, heart, or liver disease and have to limit fluids, talk with your doctor before you increase the amount of fluids you drink.)  · Avoid drinks that are carbonated or have caffeine. They can irritate the bladder. · Urinate often. Try to empty your bladder each time. For women:  · Urinate right after you have sex. · After going to the bathroom, wipe from front to back. · Avoid douches, bubble baths, and feminine hygiene sprays. And avoid other feminine hygiene products that have deodorants. When should you call for help? Call your doctor now or seek immediate medical care if:    · You have new symptoms, such as fever, nausea, or vomiting.     · You have new or worse symptoms of a urinary problem. For example:  ? You have blood or pus in your urine. ? You have chills or body aches. ? It hurts worse to urinate. ? You have groin or belly pain. ? You have pain in your back just below your rib cage (the flank area). Watch closely for changes in your health, and be sure to contact your doctor if you have any problems. Where can you learn more? Go to https://[employer]In[employer]. org and sign in to your [employer] account. Enter W181 in the [employer]Fox box to learn more about \"Painful Urination (Dysuria): Care Instructions. \"     If you do not have an account, please click on the \"Sign Up Now\" link. Current as of: February 10, 2021               Content Version: 13.1  © 9082-9877 Healthwise, Incorporated. Care instructions adapted under license by [facility] ([facility]). If you have questions about a medical condition or this instruction, always ask your healthcare professional. [place] [address] any warranty or liability for your use of this information.

## 2022-01-04 ENCOUNTER — E-VISIT (OUTPATIENT)
Dept: PRIMARY CARE CLINIC | Age: 32
End: 2022-01-04
Payer: COMMERCIAL

## 2022-01-04 DIAGNOSIS — N76.0 ACUTE VAGINITIS: Primary | ICD-10-CM

## 2022-01-04 PROCEDURE — 99422 OL DIG E/M SVC 11-20 MIN: CPT | Performed by: NURSE PRACTITIONER

## 2022-01-04 RX ORDER — FLUCONAZOLE 150 MG/1
150 TABLET ORAL ONCE
Qty: 1 TABLET | Refills: 1 | Status: SHIPPED | OUTPATIENT
Start: 2022-01-04 | End: 2022-01-04

## 2022-01-05 ENCOUNTER — TELEPHONE (OUTPATIENT)
Dept: FAMILY MEDICINE CLINIC | Age: 32
End: 2022-01-05

## 2022-01-05 NOTE — PROGRESS NOTES
Reviewed questionnaire  Reviewed previous encounters, meds, allergies and history     Dx vaginitis     Plan  -rx for diflucan. May repeat in 3 days if symptoms do not improve     I'm sorry to hear that you haven't been feeling well. I will send in diflucan for what sounds like a yeast infection. If your symptoms worsen or fail to improve please see you PCP or OBGYN. Diflucan can be taken once or may be repeated in 3 days if your symptoms are not fully resolved. I have included information below to help with your symptoms at home.     How you can care for yourself at home:     -Wear loose cotton clothing. Do not wear nylon or other fabric that holds body heat and moisture close to the skin. -Try sleeping without underwear.  -Do not scratch. Relieve itching with a cold pack or a cool bath. -Do not wash your vaginal area more than once a day. Use plain water or a mild, unscented soap. Air-dry the vaginal area.   -Change out of wet swimsuits after swimming.  -Do not have sex until you have finished your treatment.  -Do not douche.     Call your doctor if:      -You have unexpected vaginal bleeding.   -You have new or increased pain in your vagina or pelvis.   -You have a fever.   -You are not getting better after 3 days.   -Your symptoms come back after you finish your medicines.     Hope you feel better soon.     Time spent 11-20

## 2022-01-05 NOTE — PATIENT INSTRUCTIONS
Patient Education        Vaginitis: Care Instructions  Your Care Instructions     Vaginitis is soreness or infection of the vagina. This common problem can cause itching and burning. And it can cause a change in vaginal discharge. Sometimes it can cause pain during sex. Vaginitis may be caused by bacteria, yeast, or other germs. Some infections that cause it are caught from a sexual partner. Bath products, spermicides, and douches can irritate the vagina too. Some women have this problem during and after menopause. A drop in estrogen levels during this time can cause dryness, soreness, and pain during sex. Your doctor can give you medicine to treat an infection. And home care may help you feel better. For certain types of infections, your sex partner must be treated too. Follow-up care is a key part of your treatment and safety. Be sure to make and go to all appointments, and call your doctor if you are having problems. It's also a good idea to know your test results and keep a list of the medicines you take. How can you care for yourself at home? · If your doctor prescribed antibiotics, take them as directed. Do not stop taking them just because you feel better. You need to take the full course of antibiotics. · Take your medicines exactly as prescribed. Call your doctor if you think you are having a problem with your medicine. · Do not eat or drink anything that has alcohol if you are taking metronidazole (Flagyl). · If you have a yeast infection, use over-the-counter products as your doctor tells you to. Or take medicine your doctor prescribes exactly as directed. · Wash your vaginal area daily with water. You also can use a mild, unscented soap if you want. · Do not use scented bath products. And do not use vaginal sprays or douches. · Put a washcloth soaked in cool water on the area to relieve itching. Or you can take cool baths.   · If you have dryness because of menopause, use estrogen cream or pills that your doctor prescribes. · Ask your doctor about when it is okay to have sex. · Use a personal lubricant before sex if you have dryness. Examples are Astroglide, K-Y Jelly, and Wet Lubricant Gel. · Ask your doctor if your sex partner also needs treatment. When should you call for help? Call your doctor now or seek immediate medical care if:    · You have a fever and pelvic pain. Watch closely for changes in your health, and be sure to contact your doctor if:    · You have bleeding other than your period.     · You do not get better as expected. Where can you learn more? Go to [email or web address] and sign in to your [employer] account. Enter Q256 in the [employer] box to learn more about \"Vaginitis: Care Instructions. \"     If you do not have an account, please click on the \"Sign Up Now\" link. Current as of: February 11, 2021               Content Version: 13.1  © 2006-2021 Healthwise[employer]. Care instructions adapted under license by [facility] ([facility]). If you have questions about a medical condition or this instruction, always ask your healthcare professional. [address] any warranty or liability for your use of this information.

## 2022-03-10 RX ORDER — GABAPENTIN 600 MG/1
TABLET ORAL
Qty: 90 TABLET | Refills: 2 | Status: SHIPPED | OUTPATIENT
Start: 2022-03-10 | End: 2022-06-03 | Stop reason: SDUPTHER

## 2022-05-17 ENCOUNTER — TELEMEDICINE (OUTPATIENT)
Dept: PRIMARY CARE CLINIC | Age: 32
End: 2022-05-17
Payer: COMMERCIAL

## 2022-05-17 ENCOUNTER — HOSPITAL ENCOUNTER (OUTPATIENT)
Dept: CT IMAGING | Age: 32
Discharge: HOME | End: 2022-05-17
Payer: COMMERCIAL

## 2022-05-17 DIAGNOSIS — Z87.442 H/O RENAL CALCULI: ICD-10-CM

## 2022-05-17 DIAGNOSIS — R10.9 LEFT FLANK PAIN: Primary | ICD-10-CM

## 2022-05-17 DIAGNOSIS — R10.9 LEFT FLANK PAIN: ICD-10-CM

## 2022-05-17 PROCEDURE — 99213 OFFICE O/P EST LOW 20 MIN: CPT | Performed by: NURSE PRACTITIONER

## 2022-05-17 PROCEDURE — 74176 CT ABD & PELVIS W/O CONTRAST: CPT

## 2022-05-17 ASSESSMENT — ENCOUNTER SYMPTOMS
EYES NEGATIVE: 1
BACK PAIN: 1
DIARRHEA: 0
RESPIRATORY NEGATIVE: 1
VOMITING: 0
NAUSEA: 1
CONSTIPATION: 0
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
## 2022-05-17 NOTE — PATIENT INSTRUCTIONS
Patient Education        Flank Pain: Care Instructions  Your Care Instructions  Flank pain is pain on the side of the back just below the rib cage and above the waist. It can be on one or both sides. Flank pain has many possible causes,including a kidney stone, a urinary tract infection, or back strain. Flank pain may get better on its own. But don't ignore new symptoms, such as fever, nausea and vomiting, urination problems, pain that gets worse, and dizziness. These may be signs of a more serious problem. You may have to havetests or other treatment. Follow-up care is a key part of your treatment and safety. Be sure to make and go to all appointments, and call your doctor if you are having problems. It's also a good idea to know your test results and keep alist of the medicines you take. How can you care for yourself at home?  Rest until you feel better.  Take pain medicines exactly as directed. ? If the doctor gave you a prescription medicine for pain, take it as prescribed. ? If you are not taking a prescription pain medicine, ask your doctor if you can take an over-the-counter pain medicine, such as acetaminophen (Tylenol), ibuprofen (Advil, Motrin), or naproxen (Aleve). Read and follow all instructions on the label.  If your doctor prescribed antibiotics, take them as directed. Do not stop taking them just because you feel better. You need to take the full course of antibiotics.  To apply heat, put a warm water bottle, a heating pad set on low, or a warm cloth on the painful area. Do not go to sleep with a heating pad on your skin.  To prevent dehydration, drink plenty of fluids. Choose water and other clear liquids until you feel better. If you have kidney, heart, or liver disease and have to limit fluids, talk with your doctor before you increase the amount of fluids you drink. When should you call for help?    Call your doctor now or seek immediate medical care if:     Your flank pain gets

## 2022-05-18 ENCOUNTER — APPOINTMENT (OUTPATIENT)
Dept: GENERAL RADIOLOGY | Age: 32
End: 2022-05-18
Payer: COMMERCIAL

## 2022-05-18 ENCOUNTER — HOSPITAL ENCOUNTER (EMERGENCY)
Age: 32
Discharge: HOME | End: 2022-05-18
Payer: COMMERCIAL

## 2022-05-18 ENCOUNTER — NURSE TRIAGE (OUTPATIENT)
Dept: OTHER | Facility: CLINIC | Age: 32
End: 2022-05-18

## 2022-05-18 VITALS
OXYGEN SATURATION: 100 % | DIASTOLIC BLOOD PRESSURE: 60 MMHG | HEART RATE: 65 BPM | RESPIRATION RATE: 14 BRPM | SYSTOLIC BLOOD PRESSURE: 112 MMHG | BODY MASS INDEX: 33.74 KG/M2 | TEMPERATURE: 98 F | WEIGHT: 215 LBS | HEIGHT: 67 IN

## 2022-05-18 DIAGNOSIS — R10.9 FLANK PAIN: Primary | ICD-10-CM

## 2022-05-18 LAB
A/G RATIO: 1.7 (ref 1.1–2.2)
ALBUMIN SERPL-MCNC: 4.7 G/DL (ref 3.4–5)
ALP BLD-CCNC: 79 U/L (ref 40–129)
ALT SERPL-CCNC: 17 U/L (ref 10–40)
ANION GAP SERPL CALCULATED.3IONS-SCNC: 10 MMOL/L (ref 3–16)
AST SERPL-CCNC: 17 U/L (ref 15–37)
BASOPHILS ABSOLUTE: 0 K/UL (ref 0–0.2)
BASOPHILS RELATIVE PERCENT: 0.8 %
BILIRUB SERPL-MCNC: 0.7 MG/DL (ref 0–1)
BILIRUBIN URINE: NEGATIVE
BLOOD, URINE: NEGATIVE
BUN BLDV-MCNC: 9 MG/DL (ref 7–20)
CALCIUM SERPL-MCNC: 9.1 MG/DL (ref 8.3–10.6)
CHLORIDE BLD-SCNC: 97 MMOL/L (ref 99–110)
CLARITY: CLEAR
CO2: 27 MMOL/L (ref 21–32)
COLOR: YELLOW
CREAT SERPL-MCNC: 0.6 MG/DL (ref 0.6–1.1)
EOSINOPHILS ABSOLUTE: 0.1 K/UL (ref 0–0.6)
EOSINOPHILS RELATIVE PERCENT: 2.3 %
GFR AFRICAN AMERICAN: >60
GFR NON-AFRICAN AMERICAN: >60
GLUCOSE BLD-MCNC: 97 MG/DL (ref 70–99)
GLUCOSE URINE: NEGATIVE MG/DL
HCG QUALITATIVE: NEGATIVE
HCT VFR BLD CALC: 38 % (ref 36–48)
HEMOGLOBIN: 12.6 G/DL (ref 12–16)
KETONES, URINE: NEGATIVE MG/DL
LEUKOCYTE ESTERASE, URINE: NEGATIVE
LIPASE: 21 U/L (ref 13–60)
LYMPHOCYTES ABSOLUTE: 1.5 K/UL (ref 1–5.1)
LYMPHOCYTES RELATIVE PERCENT: 26.3 %
MCH RBC QN AUTO: 27.5 PG (ref 26–34)
MCHC RBC AUTO-ENTMCNC: 33.2 G/DL (ref 31–36)
MCV RBC AUTO: 82.8 FL (ref 80–100)
MICROSCOPIC EXAMINATION: NORMAL
MONOCYTES ABSOLUTE: 0.3 K/UL (ref 0–1.3)
MONOCYTES RELATIVE PERCENT: 5.6 %
NEUTROPHILS ABSOLUTE: 3.6 K/UL (ref 1.7–7.7)
NEUTROPHILS RELATIVE PERCENT: 65 %
NITRITE, URINE: NEGATIVE
PDW BLD-RTO: 14 % (ref 12.4–15.4)
PH UA: 6 (ref 5–8)
PLATELET # BLD: 243 K/UL (ref 135–450)
PMV BLD AUTO: 8.8 FL (ref 5–10.5)
POTASSIUM REFLEX MAGNESIUM: 3.6 MMOL/L (ref 3.5–5.1)
PROTEIN UA: NEGATIVE MG/DL
RBC # BLD: 4.58 M/UL (ref 4–5.2)
SODIUM BLD-SCNC: 134 MMOL/L (ref 136–145)
SPECIFIC GRAVITY UA: <=1.005 (ref 1–1.03)
TOTAL PROTEIN: 7.5 G/DL (ref 6.4–8.2)
URINE REFLEX TO CULTURE: NORMAL
URINE TYPE: NORMAL
UROBILINOGEN, URINE: 0.2 E.U./DL
WBC # BLD: 5.5 K/UL (ref 4–11)

## 2022-05-18 PROCEDURE — 71045 X-RAY EXAM CHEST 1 VIEW: CPT

## 2022-05-18 PROCEDURE — 80053 COMPREHEN METABOLIC PANEL: CPT

## 2022-05-18 PROCEDURE — 84703 CHORIONIC GONADOTROPIN ASSAY: CPT

## 2022-05-18 PROCEDURE — 85025 COMPLETE CBC W/AUTO DIFF WBC: CPT

## 2022-05-18 PROCEDURE — 6360000002 HC RX W HCPCS: Performed by: PHYSICIAN ASSISTANT

## 2022-05-18 PROCEDURE — 81003 URINALYSIS AUTO W/O SCOPE: CPT

## 2022-05-18 PROCEDURE — 96372 THER/PROPH/DIAG INJ SC/IM: CPT

## 2022-05-18 PROCEDURE — 99284 EMERGENCY DEPT VISIT MOD MDM: CPT

## 2022-05-18 PROCEDURE — 2580000003 HC RX 258: Performed by: PHYSICIAN ASSISTANT

## 2022-05-18 PROCEDURE — 83690 ASSAY OF LIPASE: CPT

## 2022-05-18 PROCEDURE — 96374 THER/PROPH/DIAG INJ IV PUSH: CPT

## 2022-05-18 RX ORDER — 0.9 % SODIUM CHLORIDE 0.9 %
1000 INTRAVENOUS SOLUTION INTRAVENOUS ONCE
Status: COMPLETED | OUTPATIENT
Start: 2022-05-18 | End: 2022-05-18

## 2022-05-18 RX ORDER — KETOROLAC TROMETHAMINE 30 MG/ML
30 INJECTION, SOLUTION INTRAMUSCULAR; INTRAVENOUS ONCE
Status: COMPLETED | OUTPATIENT
Start: 2022-05-18 | End: 2022-05-18

## 2022-05-18 RX ORDER — ONDANSETRON 4 MG/1
4 TABLET, ORALLY DISINTEGRATING ORAL EVERY 8 HOURS PRN
Qty: 20 TABLET | Refills: 0 | Status: SHIPPED | OUTPATIENT
Start: 2022-05-18 | End: 2022-08-10

## 2022-05-18 RX ORDER — HYDROCODONE BITARTRATE AND ACETAMINOPHEN 5; 325 MG/1; MG/1
1 TABLET ORAL EVERY 6 HOURS PRN
Qty: 10 TABLET | Refills: 0 | Status: SHIPPED | OUTPATIENT
Start: 2022-05-18 | End: 2022-05-21

## 2022-05-18 RX ORDER — KETOROLAC TROMETHAMINE 10 MG/1
10 TABLET, FILM COATED ORAL EVERY 6 HOURS PRN
Qty: 20 TABLET | Refills: 0 | Status: SHIPPED | OUTPATIENT
Start: 2022-05-18 | End: 2022-08-10

## 2022-05-18 RX ORDER — ONDANSETRON 2 MG/ML
4 INJECTION INTRAMUSCULAR; INTRAVENOUS ONCE
Status: COMPLETED | OUTPATIENT
Start: 2022-05-18 | End: 2022-05-18

## 2022-05-18 RX ADMIN — SODIUM CHLORIDE 1000 ML: 9 INJECTION, SOLUTION INTRAVENOUS at 11:07

## 2022-05-18 RX ADMIN — KETOROLAC TROMETHAMINE 30 MG: 30 INJECTION, SOLUTION INTRAMUSCULAR at 11:07

## 2022-05-18 RX ADMIN — ONDANSETRON 4 MG: 2 INJECTION INTRAMUSCULAR; INTRAVENOUS at 11:07

## 2022-05-18 ASSESSMENT — PAIN DESCRIPTION - LOCATION: LOCATION: FLANK

## 2022-05-18 ASSESSMENT — PAIN DESCRIPTION - ORIENTATION: ORIENTATION: RIGHT

## 2022-05-18 ASSESSMENT — PAIN - FUNCTIONAL ASSESSMENT: PAIN_FUNCTIONAL_ASSESSMENT: 0-10

## 2022-05-18 ASSESSMENT — PAIN DESCRIPTION - DESCRIPTORS: DESCRIPTORS: SHARP

## 2022-05-18 ASSESSMENT — PAIN SCALES - GENERAL: PAINLEVEL_OUTOF10: 7
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
## 2022-05-18 NOTE — TELEPHONE ENCOUNTER
Subjective: Caller states \"I am having severe R side back pain and nausea that is worse with urination. I had a CT scan yesterday and they said they didn't see a kidney stone but the pain has significantly increased. \"    Current Symptoms: R side back pain that radiates into R abdomen    Onset: 4 days    Associated Symptoms: nausea    Pain Severity: 8/10    Temperature: denies fever    What has been tried: tylenol, ibuprofen, aleve, icy hot    LMP: 5/7/22 Pregnant: No    Recommended disposition: Go to ED Now    Care advice provided, patient verbalizes understanding; denies any other questions or concerns; instructed to call back for any new or worsening symptoms. Patient/caller agrees to proceed to nearest Emergency Department    Attention Provider: Thank you for allowing me to participate in the care of your patient. The patient was connected to triage in response to symptoms provided. Please do not respond through this encounter as the response is not directed to a shared pool. Reason for Disposition   SEVERE pain (e.g., excruciating, scale 8-10) and present > 1 hour    Protocols used:  FLANK PAIN-ADULT-[place]

## 2022-05-18 NOTE — ED NOTES
Nayana Childress at bedside     [facility]  05/18/22 [id number]

## 2022-06-03 RX ORDER — GABAPENTIN 600 MG/1
TABLET ORAL
Qty: 90 TABLET | Refills: 2 | Status: SHIPPED | OUTPATIENT
Start: 2022-06-03 | End: 2022-08-24

## 2022-06-03 NOTE — TELEPHONE ENCOUNTER
Refill Request - Controlled Substance    CONFIRM preferrred pharmacy with the patient.      Last Seen Department: 2021    Last Seen by PCP: 2021    Last Written: 3/10/22 90 tablet 2 refills    Last UDS: n/a    Med Agreement Signed On: n/a    Next Appointment: 8/10/2022    Future appointment scheduled    Requested Prescriptions     Pending Prescriptions Disp Refills    gabapentin (NEURONTIN) 600 MG tablet 90 tablet 2     Si tid

## 2022-07-05 LAB
CHOLESTEROL, TOTAL: 119 MG/DL (ref 0–199)
GLUCOSE BLD-MCNC: 95 MG/DL (ref 70–99)
HDLC SERPL-MCNC: 33 MG/DL (ref 40–60)
LDL CHOLESTEROL CALCULATED: 74 MG/DL
TRIGL SERPL-MCNC: 61 MG/DL (ref 0–150)

## 2022-08-10 ENCOUNTER — OFFICE VISIT (OUTPATIENT)
Dept: FAMILY MEDICINE CLINIC | Age: 32
End: 2022-08-10
Payer: COMMERCIAL

## 2022-08-10 VITALS
DIASTOLIC BLOOD PRESSURE: 72 MMHG | HEIGHT: 67 IN | HEART RATE: 82 BPM | WEIGHT: 212.4 LBS | SYSTOLIC BLOOD PRESSURE: 118 MMHG | OXYGEN SATURATION: 99 % | BODY MASS INDEX: 33.34 KG/M2

## 2022-08-10 DIAGNOSIS — Z00.00 ANNUAL PHYSICAL EXAM: Primary | ICD-10-CM

## 2022-08-10 DIAGNOSIS — K91.5 POST-CHOLECYSTECTOMY SYNDROME: ICD-10-CM

## 2022-08-10 DIAGNOSIS — M50.20 PROTRUDED CERVICAL DISC: ICD-10-CM

## 2022-08-10 PROCEDURE — 99213 OFFICE O/P EST LOW 20 MIN: CPT | Performed by: FAMILY MEDICINE

## 2022-08-10 RX ORDER — CHOLESTYRAMINE 4 G/9G
1 POWDER, FOR SUSPENSION ORAL DAILY
Qty: 60 PACKET | Refills: 0 | Status: SHIPPED | OUTPATIENT
Start: 2022-08-10

## 2022-08-10 ASSESSMENT — PATIENT HEALTH QUESTIONNAIRE - PHQ9
SUM OF ALL RESPONSES TO PHQ QUESTIONS 1-9: 11
2. FEELING DOWN, DEPRESSED OR HOPELESS: 2
3. TROUBLE FALLING OR STAYING ASLEEP: 2
SUM OF ALL RESPONSES TO PHQ QUESTIONS 1-9: 11
SUM OF ALL RESPONSES TO PHQ QUESTIONS 1-9: 11
5. POOR APPETITE OR OVEREATING: 2
SUM OF ALL RESPONSES TO PHQ9 QUESTIONS 1 & 2: 2
10. IF YOU CHECKED OFF ANY PROBLEMS, HOW DIFFICULT HAVE THESE PROBLEMS MADE IT FOR YOU TO DO YOUR WORK, TAKE CARE OF THINGS AT HOME, OR GET ALONG WITH OTHER PEOPLE: 1
SUM OF ALL RESPONSES TO PHQ QUESTIONS 1-9: 11
6. FEELING BAD ABOUT YOURSELF - OR THAT YOU ARE A FAILURE OR HAVE LET YOURSELF OR YOUR FAMILY DOWN: 1
1. LITTLE INTEREST OR PLEASURE IN DOING THINGS: 0
8. MOVING OR SPEAKING SO SLOWLY THAT OTHER PEOPLE COULD HAVE NOTICED. OR THE OPPOSITE, BEING SO FIGETY OR RESTLESS THAT YOU HAVE BEEN MOVING AROUND A LOT MORE THAN USUAL: 0
9. THOUGHTS THAT YOU WOULD BE BETTER OFF DEAD, OR OF HURTING YOURSELF: 0
4. FEELING TIRED OR HAVING LITTLE ENERGY: 2
7. TROUBLE CONCENTRATING ON THINGS, SUCH AS READING THE NEWSPAPER OR WATCHING TELEVISION: 2

## 2022-08-10 ASSESSMENT — ENCOUNTER SYMPTOMS
EYE ITCHING: 0
COUGH: 0
EYE DISCHARGE: 0
SINUS PRESSURE: 0
ABDOMINAL DISTENTION: 0
SHORTNESS OF BREATH: 0
CONSTIPATION: 0
BLOOD IN STOOL: 0
DIARRHEA: 0
CHEST TIGHTNESS: 0
WHEEZING: 0
ANAL BLEEDING: 0
SORE THROAT: 0
EYE PAIN: 0
VOICE CHANGE: 0
RHINORRHEA: 0
VOMITING: 0
NAUSEA: 0
TROUBLE SWALLOWING: 0
ABDOMINAL PAIN: 0
BACK PAIN: 0
EYE REDNESS: 0

## 2022-08-10 ASSESSMENT — ANXIETY QUESTIONNAIRES
5. BEING SO RESTLESS THAT IT IS HARD TO SIT STILL: 0
GAD7 TOTAL SCORE: 13
4. TROUBLE RELAXING: 2
2. NOT BEING ABLE TO STOP OR CONTROL WORRYING: 3
3. WORRYING TOO MUCH ABOUT DIFFERENT THINGS: 2
7. FEELING AFRAID AS IF SOMETHING AWFUL MIGHT HAPPEN: 3
IF YOU CHECKED OFF ANY PROBLEMS ON THIS QUESTIONNAIRE, HOW DIFFICULT HAVE THESE PROBLEMS MADE IT FOR YOU TO DO YOUR WORK, TAKE CARE OF THINGS AT HOME, OR GET ALONG WITH OTHER PEOPLE: SOMEWHAT DIFFICULT
1. FEELING NERVOUS, ANXIOUS, OR ON EDGE: 2
6. BECOMING EASILY ANNOYED OR IRRITABLE: 1

## 2022-08-10 NOTE — PROGRESS NOTES
Subjective:      Patient ID: Gilbert Pandey is a 34 y.o. female. HPI  Sent in for annual exam-overall doing fairly well. Neck pain-she has a herniated disc but that is under fairly good control with gabapentin and not a major issue at this time. She is also taking over-the-counter Imodium for multiple bowel movements per day that started after her gallbladder surgery. She either takes 1 Imodium twice a day or sometimes to twice a day. Basically every time she eats she feels like she has to go. She denies any other issues to discuss. Review of Systems    Review of Systems   Constitutional:  Negative for fatigue and unexpected weight change. HENT:  Negative for congestion, ear pain, hearing loss, nosebleeds, postnasal drip, rhinorrhea, sinus pressure, sneezing, sore throat, tinnitus, trouble swallowing and voice change. Eyes:  Negative for pain, discharge, redness, itching and visual disturbance. Respiratory:  Negative for cough, chest tightness, shortness of breath and wheezing. Cardiovascular:  Negative for chest pain, palpitations and leg swelling. Gastrointestinal:  Negative for abdominal distention, abdominal pain, anal bleeding, blood in stool, constipation, diarrhea, nausea and vomiting. See HPI   Genitourinary:  Negative for dysuria, flank pain, frequency, hematuria, menstrual problem, pelvic pain, urgency and vaginal discharge. Musculoskeletal:  Positive for neck pain and neck stiffness. Negative for arthralgias, back pain, gait problem, joint swelling and myalgias. Skin:  Negative for pallor and rash. Neurological:  Negative for dizziness, tremors, seizures, weakness, light-headedness, numbness and headaches. Hematological:  Negative for adenopathy. Does not bruise/bleed easily. Psychiatric/Behavioral:  Negative for agitation, confusion, decreased concentration and sleep disturbance. The patient is not nervous/anxious and is not hyperactive.       Objective: Physical Exam      Physical Exam  Constitutional:       General: She is not in acute distress. Appearance: Normal appearance. She is well-developed. She is obese. She is not ill-appearing. HENT:      Head: Normocephalic. Mouth/Throat:      Mouth: Mucous membranes are moist.      Pharynx: Oropharynx is clear. Eyes:      General: No scleral icterus. Conjunctiva/sclera: Conjunctivae normal.   Neck:      Thyroid: No thyromegaly. Vascular: No carotid bruit. Cardiovascular:      Rate and Rhythm: Normal rate and regular rhythm. Heart sounds: Normal heart sounds. Pulmonary:      Effort: Pulmonary effort is normal.      Breath sounds: Normal breath sounds. Abdominal:      General: Bowel sounds are normal. There is no distension. Palpations: Abdomen is soft. There is no mass. Tenderness: There is no abdominal tenderness. Musculoskeletal:         General: No tenderness. Cervical back: Neck supple. Right lower leg: No edema. Left lower leg: No edema. Comments: Mild discomfort in the cervical area with range of motion. Lymphadenopathy:      Cervical: No cervical adenopathy. Skin:     General: Skin is warm and dry. Coloration: Skin is not pale. Neurological:      Mental Status: She is alert and oriented to person, place, and time. Motor: No abnormal muscle tone. Gait: Gait normal.   Psychiatric:         Mood and Affect: Mood normal.         Behavior: Behavior normal.         Thought Content: Thought content normal.         Judgment: Judgment normal.       Assessment:       Diagnosis Orders   1. Annual physical exam        2. Protruded cervical disc        3. Post-cholecystectomy syndrome              Plan:      Myranda Plunkett was seen today for annual exam.    Diagnoses and all orders for this visit:    Annual physical exam  Continue weight loss program by reducing carbs and increasing activity.   Protruded cervical disc  Continue gabapentin for cervical disc problem and notify me if there is any increase in neck pain or pain into the either extremity. Post-cholecystectomy syndrome    Other orders  -     cholestyramine (QUESTRAN) 4 g packet; Take 1 packet by mouth in the morning. Prescription sent for [place] packet daily and send me a note in about 5 days with an update. This is been approximately a 20-minute visit with the patient.     See me 1 year          Wendy Mckeon DO

## 2022-08-24 RX ORDER — GABAPENTIN 600 MG/1
TABLET ORAL
Qty: 90 TABLET | Refills: 2 | Status: SHIPPED | OUTPATIENT
Start: 2022-08-24 | End: 2022-09-24

## 2022-08-24 NOTE — TELEPHONE ENCOUNTER
Refill Request - Controlled Substance    CONFIRM preferrred pharmacy with the patient.      Last Seen Department: 8/10/2022    Last Seen by PCP: 8/10/2022    Last Written: 6/3/22 90 tablet 2 refills    Last UDS: n/a    Med Agreement Signed On: n/a    Next Appointment: 8/14/23     Future appointment scheduled    Requested Prescriptions     Pending Prescriptions Disp Refills    gabapentin (NEURONTIN) 600 MG tablet [Pharmacy Med Name: GABAPENTIN 600 MG TABLET] 90 tablet 2     Sig: TAKE ONE TABLET BY MOUTH THREE TIMES A DAY

## 2022-09-02 ENCOUNTER — E-VISIT (OUTPATIENT)
Dept: PRIMARY CARE CLINIC | Age: 32
End: 2022-09-02
Payer: COMMERCIAL

## 2022-09-02 DIAGNOSIS — R30.0 DYSURIA: Primary | ICD-10-CM

## 2022-09-02 PROCEDURE — 99422 OL DIG E/M SVC 11-20 MIN: CPT | Performed by: NURSE PRACTITIONER

## 2022-09-02 RX ORDER — PHENAZOPYRIDINE HYDROCHLORIDE 200 MG/1
200 TABLET, FILM COATED ORAL 3 TIMES DAILY PRN
Qty: 9 TABLET | Refills: 0 | Status: SHIPPED | OUTPATIENT
Start: 2022-09-02 | End: 2022-09-05

## 2022-09-02 RX ORDER — NITROFURANTOIN 25; 75 MG/1; MG/1
100 CAPSULE ORAL 2 TIMES DAILY
Qty: 10 CAPSULE | Refills: 0 | Status: SHIPPED | OUTPATIENT
Start: 2022-09-02 | End: 2022-09-07

## 2022-09-02 NOTE — PROGRESS NOTES
Reviewed questionnaire  Reviewed previous encounters, labs, meds, allergies and history     Dx dysuria     Plan  rx for macrobid 100 mg BID x 5 days  Rx for pyridium 200 mg TID PRN X 3 days    Increase oral fluids. Tylenol or motrin for pain.   May take azo over the counter     Please f/u with pcp if symptoms do not improve for further evaluation and possible urine culture     See educational handout     Time spent 13 min

## 2022-11-02 ENCOUNTER — NURSE ONLY (OUTPATIENT)
Dept: FAMILY MEDICINE CLINIC | Age: 32
End: 2022-11-02

## 2022-11-02 ENCOUNTER — TELEMEDICINE (OUTPATIENT)
Dept: FAMILY MEDICINE CLINIC | Age: 32
End: 2022-11-02
Payer: COMMERCIAL

## 2022-11-02 DIAGNOSIS — J06.0 SORE THROAT AND LARYNGITIS: Primary | ICD-10-CM

## 2022-11-02 DIAGNOSIS — J02.0 STREP THROAT: ICD-10-CM

## 2022-11-02 DIAGNOSIS — Z20.818 EXPOSURE TO STREP THROAT: Primary | ICD-10-CM

## 2022-11-02 DIAGNOSIS — J06.0 SORE THROAT AND LARYNGITIS: ICD-10-CM

## 2022-11-02 LAB — S PYO AG THROAT QL: NORMAL

## 2022-11-02 PROCEDURE — 99213 OFFICE O/P EST LOW 20 MIN: CPT | Performed by: NURSE PRACTITIONER

## 2022-11-02 PROCEDURE — 87880 STREP A ASSAY W/OPTIC: CPT | Performed by: NURSE PRACTITIONER

## 2022-11-02 RX ORDER — AMOXICILLIN 500 MG/1
500 CAPSULE ORAL 2 TIMES DAILY
Qty: 20 CAPSULE | Refills: 0 | Status: SHIPPED | OUTPATIENT
Start: 2022-11-02 | End: 2022-11-12

## 2022-11-02 RX ORDER — PENICILLIN V POTASSIUM 500 MG/1
500 TABLET ORAL 2 TIMES DAILY
Qty: 20 TABLET | Refills: 0 | Status: CANCELLED | OUTPATIENT
Start: 2022-11-02 | End: 2022-11-12

## 2022-11-02 ASSESSMENT — ENCOUNTER SYMPTOMS
SHORTNESS OF BREATH: 0
DIARRHEA: 0
TROUBLE SWALLOWING: 1
SINUS PAIN: 0
NAUSEA: 0
VOMITING: 0
COUGH: 0
SORE THROAT: 1

## 2022-11-02 NOTE — PROGRESS NOTES
Natasha Kayser (:  1990) is a Established patient, here for evaluation of the following: exposure to strep throat and sore throat. Patient's daughter tested positive for strep throat on 10/31/22 and her mother tested positive yesterday (22) Patient had sore throat starting last night and today has chills and a headache and general feeling of  not feeling well. Reviewed medication allergies with the patient, she reports she can take amoxicillin with no problems. Assessment & Plan   Below is the assessment and plan developed based on review of pertinent history, physical exam, labs, studies, and medications. 1. Exposure to strep throat  -     POCT rapid strep A; Future  -     Culture, Throat  2. Sore throat and laryngitis  -     POCT rapid strep A; Future  -     Culture, Throat  -     amoxicillin (AMOXIL) 500 MG capsule; Take 1 capsule by mouth 2 times daily for 10 days, Disp-20 capsule, R-0Normal  3. Strep throat  -     amoxicillin (AMOXIL) 500 MG capsule; Take 1 capsule by mouth 2 times daily for 10 days, Disp-20 capsule, R-0Normal    Return if symptoms worsen or fail to improve. Subjective   Pharyngitis  Associated symptoms include chills, headaches and a sore throat. Pertinent negatives include no congestion, coughing, fever, nausea or vomiting. Review of Systems   Constitutional:  Positive for chills. Negative for fever. HENT:  Positive for ear pain, sore throat and trouble swallowing. Negative for congestion, sinus pain and sneezing. Respiratory:  Negative for cough and shortness of breath. Gastrointestinal:  Negative for diarrhea, nausea and vomiting. Neurological:  Positive for headaches.         Objective   Patient-Reported Vitals  No data recorded     Physical Exam  [INSTRUCTIONS:  \"[x]\" Indicates a positive item  \"[]\" Indicates a negative item  -- DELETE ALL ITEMS NOT EXAMINED]    Constitutional: [x] Appears well-developed and well-nourished [x] No apparent distress      [] Abnormal -     Mental status: [x] Alert and awake  [x] Oriented to person/place/time [x] Able to follow commands    [] Abnormal -     Eyes:   EOM    [x]  Normal    [] Abnormal -   Sclera  [x]  Normal    [] Abnormal -          Discharge [x]  None visible   [] Abnormal -     HENT: [x] Normocephalic, atraumatic  [] Abnormal -   [x] Mouth/Throat: Mucous membranes are moist    External Ears [x] Normal  [] Abnormal -    Neck: [x] No visualized mass [] Abnormal -     Pulmonary/Chest: [x] Respiratory effort normal   [x] No visualized signs of difficulty breathing or respiratory distress        [] Abnormal -      Musculoskeletal:   [x] Normal gait with no signs of ataxia         [x] Normal range of motion of neck        [] Abnormal -     Neurological:        [x] No Facial Asymmetry (Cranial nerve 7 motor function) (limited exam due to video visit)          [x] No gaze palsy        [] Abnormal -          Skin:        [x] No significant exanthematous lesions or discoloration noted on facial skin         [] Abnormal -            Psychiatric:       [x] Normal Affect [] Abnormal -        [x] No Hallucinations    Other pertinent observable physical exam findings:-             Savanna Reveal, was evaluated through a synchronous (real-time) audio-video encounter. The patient (or guardian if applicable) is aware that this is a billable service, which includes applicable co-pays. This Virtual Visit was conducted with patient's (and/or legal guardian's) consent. The visit was conducted pursuant to the emergency declaration under the [address] [place] [address] authority and the [employer] Act. Patient identification was verified, and a caregiver was present when appropriate. The patient was located at Home: [phone].    Provider was located at [facility] ([address] [place] [address]): Moises Mason  [address] [place],  [address] [facility] [address].         --Peg Burnette, APRN - CNP

## 2022-11-02 NOTE — LETTER
[address]  [address] [facility] [address]  Phone: [phone]  Fax: [phone]    GINA Arteaga CNP        November 2, 2022     Patient: Savanna Tony   YOB: 1990   Date of Visit: 11/2/2022       To Whom It May Concern: It is my medical opinion that Isabelle Viera may return to work on 11/3/22. If you have any questions or concerns, please don't hesitate to call.     Sincerely,        GINA Arteaga CNP

## 2022-11-05 LAB — THROAT CULTURE: NORMAL

## 2022-11-18 ENCOUNTER — E-VISIT (OUTPATIENT)
Dept: PRIMARY CARE CLINIC | Age: 32
End: 2022-11-18
Payer: COMMERCIAL

## 2022-11-18 DIAGNOSIS — L02.91 ABSCESS: Primary | ICD-10-CM

## 2022-11-18 DIAGNOSIS — N89.8 VAGINAL DISCHARGE: ICD-10-CM

## 2022-11-18 PROCEDURE — 99422 OL DIG E/M SVC 11-20 MIN: CPT | Performed by: NURSE PRACTITIONER

## 2022-11-18 RX ORDER — FLUCONAZOLE 150 MG/1
150 TABLET ORAL ONCE
Qty: 1 TABLET | Refills: 1 | Status: SHIPPED | OUTPATIENT
Start: 2022-11-18 | End: 2022-11-18

## 2022-11-18 RX ORDER — SULFAMETHOXAZOLE AND TRIMETHOPRIM 800; 160 MG/1; MG/1
1 TABLET ORAL 2 TIMES DAILY
Qty: 14 TABLET | Refills: 0 | Status: SHIPPED | OUTPATIENT
Start: 2022-11-18 | End: 2022-11-25

## 2022-11-18 NOTE — PROGRESS NOTES
Reviewed questionnaire    Reviewed meds/allergies    Dx Vaginal discharge/abscess    Plan Rx given for bactrim and diflucan, follow up with PCP if no improvement    Time spent on visit 11 min

## 2022-11-22 ENCOUNTER — E-VISIT (OUTPATIENT)
Dept: PRIMARY CARE CLINIC | Age: 32
End: 2022-11-22
Payer: COMMERCIAL

## 2022-11-22 DIAGNOSIS — G89.29 CHRONIC LOW BACK PAIN, UNSPECIFIED BACK PAIN LATERALITY, UNSPECIFIED WHETHER SCIATICA PRESENT: Primary | ICD-10-CM

## 2022-11-22 DIAGNOSIS — M54.50 CHRONIC LOW BACK PAIN, UNSPECIFIED BACK PAIN LATERALITY, UNSPECIFIED WHETHER SCIATICA PRESENT: Primary | ICD-10-CM

## 2022-11-22 PROCEDURE — 99422 OL DIG E/M SVC 11-20 MIN: CPT | Performed by: NURSE PRACTITIONER

## 2022-11-22 RX ORDER — TIZANIDINE 4 MG/1
4 TABLET ORAL 3 TIMES DAILY
Qty: 30 TABLET | Refills: 0 | Status: SHIPPED | OUTPATIENT
Start: 2022-11-22

## 2022-11-22 NOTE — PROGRESS NOTES
Reviewed questionnaire    Reviewed meds/allergies    Dx Back pain    Plan Rx given for zanaflex, please follow up with your PCP if no improvement    Time spent on visit 11 min

## 2022-12-08 ENCOUNTER — E-VISIT (OUTPATIENT)
Dept: PRIMARY CARE CLINIC | Age: 32
End: 2022-12-08
Payer: COMMERCIAL

## 2022-12-08 DIAGNOSIS — M54.9 ACUTE BACK PAIN, UNSPECIFIED BACK LOCATION, UNSPECIFIED BACK PAIN LATERALITY: Primary | ICD-10-CM

## 2022-12-08 PROCEDURE — 99423 OL DIG E/M SVC 21+ MIN: CPT | Performed by: NURSE PRACTITIONER

## 2022-12-08 RX ORDER — NAPROXEN 500 MG/1
500 TABLET ORAL 2 TIMES DAILY WITH MEALS
Qty: 60 TABLET | Refills: 0 | Status: SHIPPED | OUTPATIENT
Start: 2022-12-08

## 2022-12-08 NOTE — PROGRESS NOTES
Rebecca Navas (1990) initiated an asynchronous digital communication through [employer]. HPI: per patient questionnaire     Exam: not applicable    Diagnoses and all orders for this visit:  Diagnoses and all orders for this visit:    Acute back pain, unspecified back location, unspecified back pain laterality    Other orders  -     naproxen (NAPROSYN) 500 MG tablet; Take 1 tablet by mouth 2 times daily (with meals)  E visit on 11/22. Given zanaflex. Will give NSAID. Recommend f/u with pcp for further evaluation since sx persist. Supportive care measures provided     EV3 - 21 or more minutes were spent on the digital evaluation and management of this patient. Time: EV3 - 21 or more minutes were spent on the digital evaluation and management of this patient. 22 min     Katharina Freeman, APRN - CNP

## 2022-12-22 ENCOUNTER — E-VISIT (OUTPATIENT)
Dept: PRIMARY CARE CLINIC | Age: 32
End: 2022-12-22
Payer: COMMERCIAL

## 2022-12-22 DIAGNOSIS — Z20.828 EXPOSURE TO THE FLU: Primary | ICD-10-CM

## 2022-12-22 DIAGNOSIS — J06.9 VIRAL URI WITH COUGH: ICD-10-CM

## 2022-12-22 PROCEDURE — 99422 OL DIG E/M SVC 11-20 MIN: CPT | Performed by: NURSE PRACTITIONER

## 2022-12-22 RX ORDER — BENZONATATE 200 MG/1
200 CAPSULE ORAL 3 TIMES DAILY PRN
Qty: 30 CAPSULE | Refills: 0 | Status: SHIPPED | OUTPATIENT
Start: 2022-12-22 | End: 2022-12-29

## 2022-12-22 RX ORDER — GUAIFENESIN 600 MG/1
600 TABLET, EXTENDED RELEASE ORAL 2 TIMES DAILY
Qty: 30 TABLET | Refills: 0 | Status: SHIPPED | OUTPATIENT
Start: 2022-12-22 | End: 2023-01-06

## 2022-12-22 ASSESSMENT — LIFESTYLE VARIABLES: SMOKING_STATUS: NO, I'VE NEVER SMOKED

## 2022-12-22 NOTE — PROGRESS NOTES
Orly Johnson (1990) initiated an asynchronous digital communication through [employer]. HPI: per patient questionnaire     Exam: not applicable    Diagnoses and all orders for this visit:  Diagnoses and all orders for this visit:    Exposure to the flu  Viral URI with cough  New, mucinex and tessalon sent. Recommend watchful waiting and supportive care with otc meds PRN. Has been 3 days, tamiflu would be ineffective. F/u with PCP for new or worsening symptoms     -     guaiFENesin (MUCINEX) 600 MG extended release tablet; Take 1 tablet by mouth 2 times daily for 15 days  -     benzonatate (TESSALON) 200 MG capsule; Take 1 capsule by mouth 3 times daily as needed for Cough      Time: EV2 - 11-20 minutes were spent on the digital evaluation and management of this patient.      GINA Linares - CNP

## 2023-02-03 LAB — PAP SMEAR, EXTERNAL: NEGATIVE

## 2023-02-27 ENCOUNTER — E-VISIT (OUTPATIENT)
Dept: PRIMARY CARE CLINIC | Age: 33
End: 2023-02-27
Payer: COMMERCIAL

## 2023-02-27 DIAGNOSIS — H04.123 DRY EYES: Primary | ICD-10-CM

## 2023-02-27 PROCEDURE — 99422 OL DIG E/M SVC 11-20 MIN: CPT | Performed by: NURSE PRACTITIONER

## 2023-02-27 RX ORDER — POLYETHYLENE GLYCOL 400 AND PROPYLENE GLYCOL 4; 3 MG/ML; MG/ML
1 SOLUTION/ DROPS OPHTHALMIC PRN
Qty: 10 ML | Refills: 0 | Status: SHIPPED | OUTPATIENT
Start: 2023-02-27

## 2023-02-27 NOTE — PROGRESS NOTES
Reviewed questionnaire and photo    Reviewed meds/allergies    Dx Dry eye    Plan Rx given for systane drops, follow up with PCP if no improvement    Time spent on visit 12 min

## 2023-03-05 ENCOUNTER — HOSPITAL ENCOUNTER (EMERGENCY)
Age: 33
Discharge: HOME | End: 2023-03-05
Payer: COMMERCIAL

## 2023-03-05 VITALS
SYSTOLIC BLOOD PRESSURE: 126 MMHG | WEIGHT: 212 LBS | HEART RATE: 82 BPM | DIASTOLIC BLOOD PRESSURE: 72 MMHG | BODY MASS INDEX: 33.27 KG/M2 | HEIGHT: 67 IN | TEMPERATURE: 98.1 F | RESPIRATION RATE: 16 BRPM | OXYGEN SATURATION: 100 %

## 2023-03-05 DIAGNOSIS — J02.9 ACUTE PHARYNGITIS, UNSPECIFIED ETIOLOGY: ICD-10-CM

## 2023-03-05 DIAGNOSIS — H65.01 RIGHT ACUTE SEROUS OTITIS MEDIA, RECURRENCE NOT SPECIFIED: Primary | ICD-10-CM

## 2023-03-05 PROCEDURE — 99284 EMERGENCY DEPT VISIT MOD MDM: CPT

## 2023-03-05 PROCEDURE — 6360000002 HC RX W HCPCS: Performed by: PHYSICIAN ASSISTANT

## 2023-03-05 PROCEDURE — 96372 THER/PROPH/DIAG INJ SC/IM: CPT

## 2023-03-05 RX ORDER — AMOXICILLIN AND CLAVULANATE POTASSIUM 875; 125 MG/1; MG/1
1 TABLET, FILM COATED ORAL 2 TIMES DAILY
Qty: 20 TABLET | Refills: 0 | Status: SHIPPED | OUTPATIENT
Start: 2023-03-05 | End: 2023-03-15

## 2023-03-05 RX ORDER — DEXAMETHASONE SODIUM PHOSPHATE 4 MG/ML
6 INJECTION, SOLUTION INTRA-ARTICULAR; INTRALESIONAL; INTRAMUSCULAR; INTRAVENOUS; SOFT TISSUE ONCE
Status: COMPLETED | OUTPATIENT
Start: 2023-03-05 | End: 2023-03-05

## 2023-03-05 RX ADMIN — DEXAMETHASONE SODIUM PHOSPHATE 6 MG: 4 INJECTION, SOLUTION INTRAMUSCULAR; INTRAVENOUS at 00:56

## 2023-03-05 ASSESSMENT — PAIN SCALES - GENERAL: PAINLEVEL_OUTOF10: 6

## 2023-03-05 ASSESSMENT — PAIN - FUNCTIONAL ASSESSMENT: PAIN_FUNCTIONAL_ASSESSMENT: 0-10

## 2023-03-05 NOTE — ED NOTES
Vimal New for d/c. Stable and ambulatory w/ all personal belongings.  No questions at d/c.      Patsy Goetz RN  03/05/23 1218

## 2023-03-05 NOTE — ED PROVIDER NOTES
[facility]  ED  EMERGENCY DEPARTMENT ENCOUNTER        Pt Name: Traci Santiago  MRN: [id number]  Armstrongfjuan 1990  Date of evaluation: 3/5/2023  Provider: Danika Salazar PA-C  PCP: Osmel Olmos DO  Note Started: 1:57 AM EST 3/5/23    CROW E      CHIEF COMPLAINT       Chief Complaint   Patient presents with    Positive For Covid-19     Covid positive on 2/20. C/o right ear pain, throat pain and swollen tongue. HISTORY OF PRESENT ILLNESS: 1 or more Elements     History from : Patient    Limitations to history : None    Traci Santiago is a 34 y.o. female who presents to the ED with a complaint of being diagnosed with a COVID infection approximately 1 week ago. Patient advised that she is concerned that she may be developing difficulty with breathing and swallowing. She also is having bilateral ear pain. Nursing Notes were all reviewed and agreed with or any disagreements were addressed in the HPI. REVIEW OF SYSTEMS :      Review of Systems   Constitutional:  Negative for chills and fever. HENT:  Positive for rhinorrhea, sinus pressure and sore throat. Negative for congestion and sinus pain. Eyes:  Negative for discharge, redness and visual disturbance. Respiratory:  Positive for cough and shortness of breath. Negative for chest tightness. Cardiovascular:  Negative for chest pain and palpitations. Gastrointestinal:  Negative for abdominal pain, constipation, diarrhea, nausea and vomiting. Genitourinary:  Negative for difficulty urinating, dysuria and frequency. Musculoskeletal: Negative. Skin: Negative. Neurological: Negative. Negative for dizziness, weakness, numbness and headaches. Psychiatric/Behavioral: Negative. All other systems reviewed and are negative. Positives and Pertinent negatives as per HPI.      SURGICAL HISTORY     Past Surgical History:   Procedure Laterality Date    APPENDECTOMY  2005    BREAST SURGERY Right 8/29/2019 EXPLORATION AND DEBRIDEMENT CHRONIC RIGHT BREAST ABSCESS performed by Becky Gandhi MD at [address] [facility]  8/2012    endometrosis    COLONOSCOPY      DILATION AND CURETTAGE OF UTERUS N/A 6/25/2020    DIAGNOSTIC LAPAROSCOPY,  DILATATION AND CURETTAGE, HYSTEROSCOPY  CPT CODE - 12856 performed by Florencio Peace MD at [address] [facility]  8/27/2013    Laparoscopy with ablation of endometriotic foci    LAPAROSCOPY N/A 11/2/2020    ROBOTIC ASSISTED LAPAROSCOPIC LYSIS OF ADHESIONS, EXCISION OF ENDOMETRIOSIS, ABLATION OF ENDOMETRIOSIS, AND CHROMOPERTUBATION [facility]  2019    DEBRIDEMENT AND EXPLORATION OF CHRONIC RIGHT BREAST ABSCESS X2    PARTIAL NEPHRECTOMY Left 11/19/2019    LEFT DAVINCI PARTIAL NEPHRECTOMY performed by Ryan Steiner MD at [address] [facility] Medication List as of 3/5/2023 12:54 AM        CONTINUE these medications which have NOT CHANGED    Details   polyethyl glycol-propyl glycol 0.4-0.3 % (SYSTANE) 0.4-0.3 % ophthalmic solution Place 1 drop into both eyes as needed for Dry Eyes, Disp-10 mL, R-0Normal      naproxen (NAPROSYN) 500 MG tablet Take 1 tablet by mouth 2 times daily (with meals), Disp-60 tablet, R-0Normal      tiZANidine (ZANAFLEX) 4 MG tablet Take 1 tablet by mouth 3 times daily, Disp-30 tablet, R-0Normal      gabapentin (NEURONTIN) 600 MG tablet TAKE ONE TABLET BY MOUTH THREE TIMES A DAY, Disp-90 tablet, R-2Normal      cholestyramine (QUESTRAN) 4 g packet Take 1 packet by mouth in the morning., Disp-60 packet, R-0Normal      levonorgestrel-ethinyl estradiol (SEASONALE) 0.15-0.03 MG per tablet Take 1 tablet by mouth daily, Disp-1 packet, R-0Normal             ALLERGIES     Ambien [zolpidem tartrate], Keflex [cephalexin], Reglan [metoclopramide], Ceclor [cefaclor], and Dermabond    FAMILYHISTORY       Family History Problem Relation Age of Onset    Cancer Mother         lung, cervical    High Blood Pressure Mother     Asthma Mother     Other Mother         chf,MI at 27    Depression Father     Diabetes Father     Bipolar Disorder Father     Heart Disease Father         CABG at 64    Other Sister         DVT during pregnancy    Other Maternal Aunt         DVT/PE    Lupus Maternal Aunt     Diabetes Paternal Grandmother         SOCIAL HISTORY       Social History     Tobacco Use    Smoking status: Never    Smokeless tobacco: Never   Vaping Use    Vaping Use: Never used   Substance Use Topics    Alcohol use: No     Alcohol/week: 0.0 standard drinks    Drug use: No       SCREENINGS        [place] Coma Scale  Eye Opening: Spontaneous  Best Verbal Response: Oriented  Best Motor Response: Obeys commands  [place] Coma Scale Score: 15                CIWA Assessment  BP: 126/72  Heart Rate: 82           PHYSICAL EXAM  1 or more Elements     ED Triage Vitals [03/05/23 0029]   BP Temp Temp Source Heart Rate Resp SpO2 Height Weight   126/72 98.1 °F (36.7 °C) Oral 82 16 100 % 5' 7\" (1.702 m) 212 lb (96.2 kg)       Physical Exam  Vitals and nursing note reviewed. Constitutional:       Appearance: Normal appearance. She is well-developed. She is not diaphoretic. HENT:      Head: Normocephalic and atraumatic. Nose: Nose normal.      Mouth/Throat:      Mouth: Mucous membranes are moist.      Pharynx: No oropharyngeal exudate. Eyes:      General:         Right eye: No discharge. Left eye: No discharge. Extraocular Movements: Extraocular movements intact. Conjunctiva/sclera: Conjunctivae normal.      Pupils: Pupils are equal, round, and reactive to light. Cardiovascular:      Rate and Rhythm: Normal rate and regular rhythm. Heart sounds: Normal heart sounds. No murmur heard. No friction rub. No gallop. Pulmonary:      Effort: Pulmonary effort is normal. No respiratory distress.       Breath sounds: Normal breath sounds. No wheezing. Abdominal:      General: Bowel sounds are normal. There is no distension. Palpations: Abdomen is soft. Tenderness: There is no abdominal tenderness. Musculoskeletal:         General: Normal range of motion. Cervical back: Normal range of motion. Skin:     General: Skin is warm and dry. Capillary Refill: Capillary refill takes less than 2 seconds. Neurological:      General: No focal deficit present. Mental Status: She is alert. Psychiatric:         Mood and Affect: Mood normal.         Behavior: Behavior normal.       DIAGNOSTIC RESULTS     PAST MEDICAL HISTORY      has a past medical history of Anemia, Anesthesia complication, Anxiety (4/80/4675), Asthma, Cervical strain (9/8/2014), Depression, Drug effect, Endometriosis, Infertility, female, Irritable bowel syndrome, Migraine, Nausea & vomiting, Obesity (BMI 30-39.9) (5/18/2014), Ovarian cyst, PONV (postoperative nausea and vomiting), Prolonged emergence from general anesthesia, Rotator cuff tendonitis (2/11/2016), Stress incontinence, and Unspecified sleep apnea. Chronic Conditions affecting Care: above     EMERGENCY DEPARTMENT COURSE and DIFFERENTIAL DIAGNOSIS/MDM:   Vitals:    Vitals:    03/05/23 0029   BP: 126/72   Pulse: 82   Resp: 16   Temp: 98.1 °F (36.7 °C)   TempSrc: Oral   SpO2: 100%   Weight: 212 lb (96.2 kg)   Height: 5' 7\" (1.702 m)       Patient was given the following medications:  Medications   Dexamethasone Sodium Phosphate injection 6 mg (6 mg IntraMUSCular Given 3/5/23 0056)           CC/HPI Summary, DDx, ED Course, and Reassessment: Patient is reassured. Vitals WNL. She is given a dose of an IM steroid while in our department to help with her pharyngitis. Advise follow-up with PCP. Continue to treat the symptoms of her illness. Her airway is patent we have no indication for inpatient treatment. I am the Primary Clinician of Record. FINAL IMPRESSION      1.  Right acute serous otitis media, recurrence not specified    2.  Acute pharyngitis, unspecified etiology          DISPOSITION/PLAN     DISPOSITION Decision To Discharge 03/05/2023 12:43:44 AM      PATIENT REFERRED TO:  Paramjit Carreno DO  [address],[place] [address]  [phone]          DISCHARGE MEDICATIONS:  Discharge Medication List as of 3/5/2023 12:54 AM        START taking these medications    Details   amoxicillin-clavulanate (AUGMENTIN) 875-125 MG per tablet Take 1 tablet by mouth 2 times daily for 10 days, Disp-20 tablet, R-0Normal             DISCONTINUED MEDICATIONS:  Discharge Medication List as of 3/5/2023 12:54 AM                 (Please note that portions of this note were completed with a voice recognition program.  Efforts were made to edit the dictations but occasionally words are mis-transcribed.)    Sonia Fan PA-C (electronically signed)            Sonia Fan PA-C  03/06/23 0153

## 2023-03-06 ASSESSMENT — ENCOUNTER SYMPTOMS
SINUS PAIN: 0
ABDOMINAL PAIN: 0
CONSTIPATION: 0
COUGH: 1
SORE THROAT: 1
RHINORRHEA: 1
NAUSEA: 0
EYE DISCHARGE: 0
VOMITING: 0
CHEST TIGHTNESS: 0
EYE REDNESS: 0
SHORTNESS OF BREATH: 1
SINUS PRESSURE: 1
DIARRHEA: 0

## 2023-03-07 ENCOUNTER — HOSPITAL ENCOUNTER (EMERGENCY)
Age: 33
Discharge: HOME | End: 2023-03-07
Payer: COMMERCIAL

## 2023-03-07 VITALS
HEIGHT: 67 IN | RESPIRATION RATE: 16 BRPM | WEIGHT: 210 LBS | HEART RATE: 68 BPM | SYSTOLIC BLOOD PRESSURE: 122 MMHG | DIASTOLIC BLOOD PRESSURE: 68 MMHG | BODY MASS INDEX: 32.96 KG/M2 | OXYGEN SATURATION: 99 % | TEMPERATURE: 98.4 F

## 2023-03-07 DIAGNOSIS — K14.0 GLOSSITIS: Primary | ICD-10-CM

## 2023-03-07 PROCEDURE — 6360000002 HC RX W HCPCS: Performed by: PHYSICIAN ASSISTANT

## 2023-03-07 PROCEDURE — 99283 EMERGENCY DEPT VISIT LOW MDM: CPT

## 2023-03-07 RX ORDER — DEXAMETHASONE 4 MG/1
10 TABLET ORAL ONCE
Status: COMPLETED | OUTPATIENT
Start: 2023-03-07 | End: 2023-03-07

## 2023-03-07 RX ADMIN — DEXAMETHASONE 10 MG: 4 TABLET ORAL at 23:35

## 2023-03-07 ASSESSMENT — PAIN - FUNCTIONAL ASSESSMENT: PAIN_FUNCTIONAL_ASSESSMENT: 0-10

## 2023-03-07 ASSESSMENT — PAIN SCALES - GENERAL: PAINLEVEL_OUTOF10: 5

## 2023-03-08 ENCOUNTER — CARE COORDINATION (OUTPATIENT)
Dept: OTHER | Facility: CLINIC | Age: 33
End: 2023-03-08

## 2023-03-08 NOTE — ED NOTES
Pt discharged In stable condition. Pt had no further questions.       Aurelio Vizcarra RN  03/07/23 0766
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
## 2023-03-09 ENCOUNTER — OFFICE VISIT (OUTPATIENT)
Dept: ENT CLINIC | Age: 33
End: 2023-03-09
Payer: COMMERCIAL

## 2023-03-09 ENCOUNTER — CARE COORDINATION (OUTPATIENT)
Dept: OTHER | Facility: CLINIC | Age: 33
End: 2023-03-09

## 2023-03-09 VITALS — BODY MASS INDEX: 32.96 KG/M2 | WEIGHT: 210 LBS | HEIGHT: 67 IN | TEMPERATURE: 97.9 F | RESPIRATION RATE: 16 BRPM

## 2023-03-09 DIAGNOSIS — J35.1 LINGUAL TONSIL HYPERTROPHY: ICD-10-CM

## 2023-03-09 DIAGNOSIS — K21.9 LARYNGOPHARYNGEAL REFLUX (LPR): Primary | ICD-10-CM

## 2023-03-09 PROCEDURE — 31575 DIAGNOSTIC LARYNGOSCOPY: CPT | Performed by: STUDENT IN AN ORGANIZED HEALTH CARE EDUCATION/TRAINING PROGRAM

## 2023-03-09 PROCEDURE — 99214 OFFICE O/P EST MOD 30 MIN: CPT | Performed by: STUDENT IN AN ORGANIZED HEALTH CARE EDUCATION/TRAINING PROGRAM

## 2023-03-09 RX ORDER — PANTOPRAZOLE SODIUM 20 MG/1
20 TABLET, DELAYED RELEASE ORAL
Qty: 90 TABLET | Refills: 1 | Status: SHIPPED | OUTPATIENT
Start: 2023-03-09

## 2023-03-09 ASSESSMENT — ENCOUNTER SYMPTOMS
TROUBLE SWALLOWING: 1
COUGH: 0
VOMITING: 0
NAUSEA: 0
EYE PAIN: 0
RHINORRHEA: 0
SHORTNESS OF BREATH: 0
VOICE CHANGE: 1

## 2023-03-09 NOTE — CARE COORDINATION
[facility] ED Follow Up Call    3/9/2023    Patient: Rashard Boyd Patient : 1990   MRN: [id number]  Reason for Admission: sore throat, tongue swelling   Discharge Date: 3/7/23    ACM attempted 2nd outreach to reach patient for introduction to Associate Care Management. HIPAA compliant message left requesting a return phone call at patients convenience. Unable to Reach Letter sent to patient via My Chart. Will continue to outreach patient. Care Transitions ED Follow Up    Care Transitions Interventions                  Faustina POZO, RN- [facility]  Associate Care Manager  [phone]  [email or web address]

## 2023-03-09 NOTE — PROGRESS NOTES
[facility]      Patient Name: June Monroy Record Number:  [id number]  Primary Care Physician:  Ani Garcia DO  Date of Consultation: 3/9/2023    Chief Complaint:   Chief Complaint   Patient presents with    Oral Swelling     States that she has a hard time swallowing pills, had COVID in Feb, got bad ear pain and neck pain starting last week. States that her tongue looks swollen on the right side, went to ED recently and was put on ABX, was told to monitor. States that her tongue was gagging her. Had steroid injection and oral steroids. HISTORY OF PRESENT ILLNESS  Severo Calamity is a(n) 28 y.o. female who presents with right-sided neck swelling for approximately 6 months. She feels that it has slowly enlarged. It is tender to touch. There is no difficulty with breathing or swallowing. She does not have a smoking history and is not a current smoker. She has no family history of lymphomas or leukemias. She does admit to fatigue however she denies any night sweats. She has lost approximately 40 pounds in last couple of weeks but states that she has been eating healthier and trying to lose weight. She also has a sore throat that has been present for about a month. She denies any reflux symptoms. She denies any hoarseness, globus sensation or cough. Interval history 3/9/2023  Severo Calamity presents for evaluation of fullness in her tongue. She was seen in the ER yesterday and several days ago and given Augmentin for an otitis media and several steroid injections for tongue swelling. She has stopped the Augmentin because she stated it made her tongue feel worse. She also complains of some change in voice and feeling as other something stuck in her throat. She did have COVID in February.     Patient Active Problem List   Diagnosis    Insomnia    Migraine with aura    Pelvic peritoneal endometriosis    Dysmenorrhea    Family history of ischemic heart disease    Obesity (BMI 30-39. 9)    History of ovarian cyst    Interstitial cystitis    Vitamin D deficiency    Depression    Snoring    Chronic fatigue    Palpitations    Precordial pain    CRP elevated    Arthralgia of shoulder region    Protruded cervical disc    Neck pain    Abscess of right breast    Left renal mass    Acute bacterial sinusitis     Past Surgical History:   Procedure Laterality Date    ADENOIDECTOMY      APPENDECTOMY  2005    BREAST SURGERY Right 08/29/2019    EXPLORATION AND DEBRIDEMENT CHRONIC RIGHT BREAST ABSCESS performed by Jessica Gautam MD at [facility]  08/2012    endometrosis    COLONOSCOPY      DILATION AND CURETTAGE OF UTERUS N/A 06/25/2020    DIAGNOSTIC LAPAROSCOPY,  DILATATION AND CURETTAGE, HYSTEROSCOPY  CPT CODE - 76095 performed by Dipesh Baum MD at [address] [facility]  08/27/2013    Laparoscopy with ablation of endometriotic foci    LAPAROSCOPY N/A 11/02/2020    ROBOTIC ASSISTED LAPAROSCOPIC LYSIS OF ADHESIONS, EXCISION OF ENDOMETRIOSIS, ABLATION OF ENDOMETRIOSIS, AND CHROMOPERTUBATION performed by Dipesh Baum MD at [address] [facility]  2019    DEBRIDEMENT AND EXPLORATION OF CHRONIC RIGHT BREAST ABSCESS X2    PARTIAL NEPHRECTOMY Left 11/19/2019    LEFT DAVINCI PARTIAL NEPHRECTOMY performed by Dar Dietz MD at [address]     Family History   Problem Relation Age of Onset    Cancer Mother         lung, cervical    High Blood Pressure Mother     Asthma Mother     Other Mother         chf,MI at 27    Depression Father     Diabetes Father     Bipolar Disorder Father     Heart Disease Father         CABG at 64    Other Sister         DVT during pregnancy    Other Maternal Aunt         DVT/PE    Lupus Maternal Aunt     Diabetes Paternal Grandmother      Social History     Socioeconomic History    Marital status:      Spouse name: Kiel Nixon    Number of children: 0    Years of education: Not on file    Highest education level: Not on file   Occupational History    Occupation: Medical Assistant     Comment: [employer]   Tobacco Use    Smoking status: Never    Smokeless tobacco: Never   Vaping Use    Vaping Use: Never used   Substance and Sexual Activity    Alcohol use: No     Alcohol/week: 0.0 standard drinks    Drug use: No    Sexual activity: Yes     Partners: Male   Other Topics Concern    Not on file   Social History Narrative    Not on file     Social Determinants of Health     Financial Resource Strain: Not on file   Food Insecurity: Not on file   Transportation Needs: Not on file   Physical Activity: Not on file   Stress: Not on file   Social Connections: Not on file   Intimate Partner Violence: Not on file   Housing Stability: Not on file       DRUG/FOOD ALLERGIES: Ambien [zolpidem tartrate], Keflex [cephalexin], Reglan [metoclopramide], Ceclor [cefaclor], and Dermabond    CURRENT MEDICATIONS  Prior to Admission medications    Medication Sig Start Date End Date Taking? Authorizing Provider   pantoprazole (PROTONIX) 20 MG tablet Take 1 tablet by mouth every morning (before breakfast) 3/9/23  Yes Scott Roman DO   amoxicillin-clavulanate (AUGMENTIN) 875-125 MG per tablet Take 1 tablet by mouth 2 times daily for 10 days  Patient not taking: Reported on 3/9/2023 3/5/23 3/15/23  Jinny Leigh PA-C   polyethyl glycol-propyl glycol 0.4-0.3 % (SYSTANE) 0.4-0.3 % ophthalmic solution Place 1 drop into both eyes as needed for Dry Eyes  Patient not taking: Reported on 3/9/2023 2/27/23   GINA Chirinos CNP   naproxen (NAPROSYN) 500 MG tablet Take 1 tablet by mouth 2 times daily (with meals) 12/8/22   GINA Crews CNP   gabapentin (NEURONTIN) 600 MG tablet TAKE ONE TABLET BY MOUTH THREE TIMES A DAY 8/24/22 9/24/22  Marc Verdin DO   cholestyramine (QUESTRAN) 4 g packet Take 1 packet by mouth in the morning.   Patient not taking: Reported on 11/2/2022 8/10/22   Marc Verdin DO   levonorgestrel-ethinyl estradiol (SEASONALE) 0.15-0.03 MG per tablet Take 1 tablet by mouth daily  Patient not taking: No sig reported 6/27/22   Shar Verdin DO       REVIEW OF SYSTEMS  The following systems were reviewed and revealed the following in addition to any already discussed in the HPI:    Review of Systems   Constitutional:  Negative for fatigue and fever. HENT:  Positive for trouble swallowing and voice change. Negative for congestion, ear pain, postnasal drip, rhinorrhea and sneezing. Eyes:  Negative for pain and visual disturbance. Respiratory:  Negative for cough and shortness of breath. Cardiovascular:  Negative for chest pain. Gastrointestinal:  Negative for nausea and vomiting. Endocrine: Negative. Genitourinary: Negative. Musculoskeletal:  Negative for neck pain and neck stiffness. Skin:  Negative for rash. Neurological:  Negative for dizziness and headaches.       PHYSICAL EXAM  Temp 97.9 °F (36.6 °C) (Infrared)   Resp 16   Ht 5' 7\" (1.702 m)   Wt 210 lb (95.3 kg)   BMI 32.89 kg/m²     GENERAL: No Acute Distress, Alert and Oriented, no hoarseness  EYES: EOMI, Anti-icteric  NOSE: No epistaxis, nasal mucosa within normal limits, no purulent drainage  EARS: Normal external canal appearance, EAC patent bilaterally, EMs intact bilaterally  FACE: 1/6 House-Brackmann Scale, symmetric, sensation equal bilaterally  ORAL CAVITY: No masses or lesions palpated, uvula is midline, moist mucous membranes, tongue symmetric bilaterally with prominent lingual tonsils  NECK: Normal range of motion, no thyromegaly, trachea is midline, no crepitus  CHEST: Normal respiratory effort, no retractions, breathing comfortably  SKIN: No rashes, normal appearing skin, no evidence of skin lesions/tumors    RADIOLOGY  Summary of findings:    PROCEDURE  Flexible Laryngoscopy    Procedure : Flexible Laryngoscopy  Surgeon: Oscar Katz DO  Anesthesia: Afrin with 4% lidocaine  Indication: Laryngeal mirror examination was not tolerated due to gag reflex  Description:  After verbal consent was obtained, the scope was passed along the floor of the left naris to the level of the larynx. There was no evidence of concerning masses or lesions of the base of tongue, vallecula, epiglottis, aryepiglottic folds, arytenoids, false vocal folds, true vocal folds, or pyriform sinuses. True vocal folds exhibited symmetric motion bilaterally without evidence of paralysis or paresis. The scope was removed. The patient tolerated the procedure without difficulty. There were no complications. Pertinent findings: mild post arytenoid edema and prominent lingual tonsils    ASSESSMENT/PLAN  Annette Bennett is a very pleasant 28 y.o. female with     1. Laryngopharyngeal reflux (LPR)  Given her flexible laryngoscopy findings and globus sensation with change in voice we will treat her with Protonix to see if this helps. She does have overt reflux at times. - NE LARYNGOSCOPY FLEXIBLE DIAGNOSTIC  - pantoprazole (PROTONIX) 20 MG tablet; Take 1 tablet by mouth every morning (before breakfast)  Dispense: 90 tablet; Refill: 1    2. Lingual tonsil hypertrophy  Her lingual tonsils are enlarged on flexible laryngoscopy. This is similar to the the findings of the CT scan performed in 2021. She may have slight enlargement secondary to recovering from [place]. We will treat any potential irritants like reflux and have her follow-up in 2 to 3 months for reevaluation. Follow-up in 2 to 3 months or sooner if needed. Medical Decision Making:   The following items were considered in medical decision making:  Independent review of images  Review / order clinical lab tests  Review / order radiology tests  Decision to obtain old records

## 2023-03-16 ENCOUNTER — CARE COORDINATION (OUTPATIENT)
Dept: OTHER | Facility: CLINIC | Age: 33
End: 2023-03-16

## 2023-05-09 ENCOUNTER — E-VISIT (OUTPATIENT)
Dept: PRIMARY CARE CLINIC | Age: 33
End: 2023-05-09
Payer: COMMERCIAL

## 2023-05-09 DIAGNOSIS — R30.0 DYSURIA: Primary | ICD-10-CM

## 2023-05-09 PROCEDURE — 99422 OL DIG E/M SVC 11-20 MIN: CPT | Performed by: NURSE PRACTITIONER

## 2023-05-09 RX ORDER — NITROFURANTOIN 25; 75 MG/1; MG/1
100 CAPSULE ORAL 2 TIMES DAILY
Qty: 10 CAPSULE | Refills: 0 | Status: SHIPPED | OUTPATIENT
Start: 2023-05-09 | End: 2023-05-14

## 2023-05-09 RX ORDER — PHENAZOPYRIDINE HYDROCHLORIDE 100 MG/1
100 TABLET, FILM COATED ORAL 3 TIMES DAILY PRN
Qty: 6 TABLET | Refills: 0 | Status: SHIPPED | OUTPATIENT
Start: 2023-05-09 | End: 2023-05-11

## 2023-05-09 NOTE — PROGRESS NOTES
Reviewed questionnaire    Reviewed meds/allergies    Dx Dysuria    Plan Rx given for macrobid and pyridium, follow up with PCP if no improvement    Time spent on visit 11 min

## 2023-06-07 ENCOUNTER — E-VISIT (OUTPATIENT)
Dept: PRIMARY CARE CLINIC | Age: 33
End: 2023-06-07
Payer: COMMERCIAL

## 2023-06-07 DIAGNOSIS — B37.31 YEAST VAGINITIS: Primary | ICD-10-CM

## 2023-06-07 PROCEDURE — 99422 OL DIG E/M SVC 11-20 MIN: CPT | Performed by: PHYSICIAN ASSISTANT

## 2023-06-07 RX ORDER — FLUCONAZOLE 150 MG/1
150 TABLET ORAL
Qty: 2 TABLET | Refills: 0 | Status: SHIPPED | OUTPATIENT
Start: 2023-06-07 | End: 2023-06-13

## 2023-06-07 NOTE — PROGRESS NOTES
I have reviewed the questionnaire, PMH, Medications and allergies. Diagnoses and all orders for this visit:    Yeast vaginitis  -     fluconazole (DIFLUCAN) 150 MG tablet; Take 1 tablet by mouth every 72 hours for 6 days        Continue with supportive care and follow up with PCP if no improvement with treatment. 11-20 minutes were spent on the digital evaluation and management of this patient.

## 2023-08-01 ENCOUNTER — APPOINTMENT (OUTPATIENT)
Dept: CT IMAGING | Age: 33
End: 2023-08-01
Payer: COMMERCIAL

## 2023-08-01 ENCOUNTER — APPOINTMENT (OUTPATIENT)
Dept: ULTRASOUND IMAGING | Age: 33
End: 2023-08-01
Payer: COMMERCIAL

## 2023-08-01 ENCOUNTER — HOSPITAL ENCOUNTER (EMERGENCY)
Age: 33
Discharge: HOME | End: 2023-08-01
Attending: EMERGENCY MEDICINE
Payer: COMMERCIAL

## 2023-08-01 VITALS
OXYGEN SATURATION: 97 % | DIASTOLIC BLOOD PRESSURE: 54 MMHG | RESPIRATION RATE: 16 BRPM | SYSTOLIC BLOOD PRESSURE: 112 MMHG | HEART RATE: 81 BPM | TEMPERATURE: 98.7 F

## 2023-08-01 DIAGNOSIS — R10.31 RIGHT LOWER QUADRANT ABDOMINAL PAIN: Primary | ICD-10-CM

## 2023-08-01 LAB
ALBUMIN SERPL-MCNC: 4.4 G/DL (ref 3.4–5)
ALBUMIN/GLOB SERPL: 1.5 {RATIO} (ref 1.1–2.2)
ALP SERPL-CCNC: 76 U/L (ref 40–129)
ALT SERPL-CCNC: 19 U/L (ref 10–40)
ANION GAP SERPL CALCULATED.3IONS-SCNC: 11 MMOL/L (ref 3–16)
AST SERPL-CCNC: 20 U/L (ref 15–37)
BACTERIA URNS QL MICRO: ABNORMAL /HPF
BASOPHILS # BLD: 0 K/UL (ref 0–0.2)
BASOPHILS NFR BLD: 0.3 %
BILIRUB SERPL-MCNC: 0.5 MG/DL (ref 0–1)
BILIRUB UR QL STRIP.AUTO: NEGATIVE
BUN SERPL-MCNC: 13 MG/DL (ref 7–20)
CALCIUM SERPL-MCNC: 9.1 MG/DL (ref 8.3–10.6)
CHLORIDE SERPL-SCNC: 98 MMOL/L (ref 99–110)
CLARITY UR: CLEAR
CO2 SERPL-SCNC: 24 MMOL/L (ref 21–32)
COLOR UR: YELLOW
CREAT SERPL-MCNC: 0.6 MG/DL (ref 0.6–1.1)
DEPRECATED RDW RBC AUTO: 13.6 % (ref 12.4–15.4)
EOSINOPHIL # BLD: 0.1 K/UL (ref 0–0.6)
EOSINOPHIL NFR BLD: 0.6 %
EPI CELLS #/AREA URNS HPF: ABNORMAL /HPF (ref 0–5)
GFR SERPLBLD CREATININE-BSD FMLA CKD-EPI: >60 ML/MIN/{1.73_M2}
GLUCOSE SERPL-MCNC: 90 MG/DL (ref 70–99)
GLUCOSE UR STRIP.AUTO-MCNC: NEGATIVE MG/DL
HCG SERPL QL: NEGATIVE
HCT VFR BLD AUTO: 37.2 % (ref 36–48)
HGB BLD-MCNC: 12.7 G/DL (ref 12–16)
HGB UR QL STRIP.AUTO: ABNORMAL
KETONES UR STRIP.AUTO-MCNC: ABNORMAL MG/DL
LEUKOCYTE ESTERASE UR QL STRIP.AUTO: ABNORMAL
LIPASE SERPL-CCNC: 19 U/L (ref 13–60)
LYMPHOCYTES # BLD: 1.7 K/UL (ref 1–5.1)
LYMPHOCYTES NFR BLD: 18.3 %
MCH RBC QN AUTO: 29 PG (ref 26–34)
MCHC RBC AUTO-ENTMCNC: 34.2 G/DL (ref 31–36)
MCV RBC AUTO: 84.9 FL (ref 80–100)
MONOCYTES # BLD: 0.6 K/UL (ref 0–1.3)
MONOCYTES NFR BLD: 6 %
MUCOUS THREADS #/AREA URNS LPF: ABNORMAL /LPF
NEUTROPHILS # BLD: 7 K/UL (ref 1.7–7.7)
NEUTROPHILS NFR BLD: 74.8 %
NITRITE UR QL STRIP.AUTO: NEGATIVE
PH UR STRIP.AUTO: 6 [PH] (ref 5–8)
PLATELET # BLD AUTO: 233 K/UL (ref 135–450)
PMV BLD AUTO: 8.2 FL (ref 5–10.5)
POTASSIUM SERPL-SCNC: 3.7 MMOL/L (ref 3.5–5.1)
PROT SERPL-MCNC: 7.4 G/DL (ref 6.4–8.2)
PROT UR STRIP.AUTO-MCNC: NEGATIVE MG/DL
RBC # BLD AUTO: 4.38 M/UL (ref 4–5.2)
RBC #/AREA URNS HPF: ABNORMAL /HPF (ref 0–4)
SODIUM SERPL-SCNC: 133 MMOL/L (ref 136–145)
SP GR UR STRIP.AUTO: 1.01 (ref 1–1.03)
UA COMPLETE W REFLEX CULTURE PNL UR: ABNORMAL
UA DIPSTICK W REFLEX MICRO PNL UR: YES
URN SPEC COLLECT METH UR: ABNORMAL
UROBILINOGEN UR STRIP-ACNC: 0.2 E.U./DL
WBC # BLD AUTO: 9.3 K/UL (ref 4–11)
WBC #/AREA URNS HPF: ABNORMAL /HPF (ref 0–5)

## 2023-08-01 PROCEDURE — 96374 THER/PROPH/DIAG INJ IV PUSH: CPT | Performed by: EMERGENCY MEDICINE

## 2023-08-01 PROCEDURE — 74177 CT ABD & PELVIS W/CONTRAST: CPT

## 2023-08-01 PROCEDURE — 85025 COMPLETE CBC W/AUTO DIFF WBC: CPT

## 2023-08-01 PROCEDURE — 76830 TRANSVAGINAL US NON-OB: CPT

## 2023-08-01 PROCEDURE — 99285 EMERGENCY DEPT VISIT HI MDM: CPT | Performed by: EMERGENCY MEDICINE

## 2023-08-01 PROCEDURE — 6360000004 HC RX CONTRAST MEDICATION: Performed by: EMERGENCY MEDICINE

## 2023-08-01 PROCEDURE — 84703 CHORIONIC GONADOTROPIN ASSAY: CPT

## 2023-08-01 PROCEDURE — 83690 ASSAY OF LIPASE: CPT

## 2023-08-01 PROCEDURE — 80053 COMPREHEN METABOLIC PANEL: CPT

## 2023-08-01 PROCEDURE — 81001 URINALYSIS AUTO W/SCOPE: CPT

## 2023-08-01 PROCEDURE — 6360000002 HC RX W HCPCS: Performed by: EMERGENCY MEDICINE

## 2023-08-01 PROCEDURE — 6370000000 HC RX 637 (ALT 250 FOR IP): Performed by: EMERGENCY MEDICINE

## 2023-08-01 PROCEDURE — 93975 VASCULAR STUDY: CPT

## 2023-08-01 RX ORDER — ONDANSETRON 2 MG/ML
4 INJECTION INTRAMUSCULAR; INTRAVENOUS ONCE
Status: COMPLETED | OUTPATIENT
Start: 2023-08-01 | End: 2023-08-01

## 2023-08-01 RX ORDER — OXYCODONE HYDROCHLORIDE 5 MG/1
5 TABLET ORAL ONCE
Status: COMPLETED | OUTPATIENT
Start: 2023-08-01 | End: 2023-08-01

## 2023-08-01 RX ORDER — OXYCODONE HYDROCHLORIDE 5 MG/1
5 TABLET ORAL EVERY 6 HOURS PRN
Qty: 12 TABLET | Refills: 0 | Status: SHIPPED | OUTPATIENT
Start: 2023-08-01 | End: 2023-08-04

## 2023-08-01 RX ORDER — ONDANSETRON 4 MG/1
4 TABLET, FILM COATED ORAL 3 TIMES DAILY PRN
Qty: 15 TABLET | Refills: 0 | Status: SHIPPED | OUTPATIENT
Start: 2023-08-01

## 2023-08-01 RX ORDER — ONDANSETRON 4 MG/1
4 TABLET, ORALLY DISINTEGRATING ORAL ONCE
Status: COMPLETED | OUTPATIENT
Start: 2023-08-01 | End: 2023-08-01

## 2023-08-01 RX ADMIN — ONDANSETRON 4 MG: 4 TABLET, ORALLY DISINTEGRATING ORAL at 14:42

## 2023-08-01 RX ADMIN — ONDANSETRON 4 MG: 2 INJECTION INTRAMUSCULAR; INTRAVENOUS at 20:15

## 2023-08-01 RX ADMIN — IOPAMIDOL 75 ML: 755 INJECTION, SOLUTION INTRAVENOUS at 15:25

## 2023-08-01 RX ADMIN — OXYCODONE HYDROCHLORIDE 5 MG: 5 TABLET ORAL at 20:15

## 2023-08-01 RX ADMIN — OXYCODONE HYDROCHLORIDE 5 MG: 5 TABLET ORAL at 14:42

## 2023-08-01 ASSESSMENT — PAIN SCALES - GENERAL
PAINLEVEL_OUTOF10: 7
PAINLEVEL_OUTOF10: 7
PAINLEVEL_OUTOF10: 4

## 2023-08-01 ASSESSMENT — PAIN DESCRIPTION - ORIENTATION: ORIENTATION: OTHER (COMMENT)

## 2023-08-01 ASSESSMENT — PAIN DESCRIPTION - LOCATION
LOCATION: ABDOMEN
LOCATION: ABDOMEN

## 2023-08-01 ASSESSMENT — PAIN DESCRIPTION - DESCRIPTORS: DESCRIPTORS: CRAMPING

## 2023-08-01 ASSESSMENT — PAIN - FUNCTIONAL ASSESSMENT: PAIN_FUNCTIONAL_ASSESSMENT: 0-10

## 2023-08-02 ENCOUNTER — CARE COORDINATION (OUTPATIENT)
Dept: OTHER | Facility: CLINIC | Age: 33
End: 2023-08-02

## 2023-08-02 NOTE — CARE COORDINATION
Ambulatory Care Coordination Note    ACM attempted to reach patient for introduction to Associate Care Management related to 8/1/23 ED visit. HIPAA compliant message left requesting a return phone call at patient convenience. Plan for follow-up call in 1-2 days      Future Appointments   Date Time Provider [address]   8/14/2023  4:00 PM DO MICK Perdue, NYDIA RN  Associate Care Manager  Phone: [phone]  Email: [email or web address]

## 2023-08-02 NOTE — DISCHARGE INSTRUCTIONS
As discussed, the CAT scan showed an ovarian cyst.  I discussed this with the gynecologist on-call and they advised on pain control and to hold off on antibiotics for now and to follow-up with Dr. Becky Echevarria.   Return for worsening symptoms

## 2023-08-04 ENCOUNTER — CARE COORDINATION (OUTPATIENT)
Dept: OTHER | Facility: CLINIC | Age: 33
End: 2023-08-04

## 2023-08-04 NOTE — CARE COORDINATION
Ambulatory Care Coordination Note    ACM attempted 2nd outreach to patient for introduction to Associate Care Management related to 8/1/23 ED visit. HIPAA compliant message left requesting a return phone call at patient convenience. Unable to Reach Letter sent to patient via [employer]. Will continue to outreach. Future Appointments   Date Time Provider [address] [place] [address]   8/14/2023  4:00 PM DO MICK Stevens - NYDIA Bernard RN  Associate Care Manager  Phone: [phone]  Email: [email or web address]

## 2023-08-16 ENCOUNTER — CARE COORDINATION (OUTPATIENT)
Dept: OTHER | Facility: CLINIC | Age: 33
End: 2023-08-16

## 2023-08-16 NOTE — CARE COORDINATION
Ambulatory Care Coordination Note    ACM attempted third and final call to patient for introduction to Associate Care Management. HIPAA compliant message left requesting a return phone call at patient convenience. Final Unable to Reach Letter sent to patient via [employer]. No further outreach scheduled with this ACM, patient has been provided with this ACM's contact information. ACM will sign off care team at this time. Future Appointments   Date Time Provider [address] [place] [address]   8/30/2023  4:30 PM DO MICK Amador MSN RN  Associate Care Manager  Phone: [phone]  Email: [email or web address]

## 2023-08-29 LAB
CHOLEST SERPL-MCNC: 134 MG/DL (ref 0–199)
GLUCOSE SERPL-MCNC: 100 MG/DL (ref 70–99)
HDLC SERPL-MCNC: 41 MG/DL (ref 40–60)
LDLC SERPL CALC-MCNC: 82 MG/DL
TRIGL SERPL-MCNC: 54 MG/DL (ref 0–150)

## 2023-08-30 ENCOUNTER — TELEPHONE (OUTPATIENT)
Dept: FAMILY MEDICINE CLINIC | Age: 33
End: 2023-08-30

## 2023-08-30 NOTE — TELEPHONE ENCOUNTER
Patient needed to reschedule appointment from today 8/30, is it okay to use provider fill for 9/27 for physical?

## 2024-02-11 ENCOUNTER — E-VISIT (OUTPATIENT)
Dept: PRIMARY CARE CLINIC | Age: 34
End: 2024-02-11

## 2024-02-11 DIAGNOSIS — L30.9 DERMATITIS: Primary | ICD-10-CM

## 2024-02-11 RX ORDER — METHYLPREDNISOLONE 4 MG/1
TABLET ORAL
Qty: 1 KIT | Refills: 0 | Status: SHIPPED | OUTPATIENT
Start: 2024-02-11 | End: 2024-02-17

## 2024-02-29 ENCOUNTER — E-VISIT (OUTPATIENT)
Dept: PRIMARY CARE CLINIC | Age: 34
End: 2024-02-29

## 2024-02-29 DIAGNOSIS — J06.9 UPPER RESPIRATORY TRACT INFECTION, UNSPECIFIED TYPE: Primary | ICD-10-CM

## 2024-02-29 PROCEDURE — 99422 OL DIG E/M SVC 11-20 MIN: CPT | Performed by: NURSE PRACTITIONER

## 2024-02-29 RX ORDER — AMOXICILLIN 500 MG/1
500 CAPSULE ORAL 3 TIMES DAILY
Qty: 21 CAPSULE | Refills: 0 | Status: SHIPPED | OUTPATIENT
Start: 2024-02-29 | End: 2024-03-07

## 2024-02-29 RX ORDER — AZITHROMYCIN 250 MG/1
TABLET, FILM COATED ORAL
Qty: 1 PACKET | Refills: 0 | Status: SHIPPED | OUTPATIENT
Start: 2024-02-29 | End: 2024-02-29

## 2024-02-29 ASSESSMENT — LIFESTYLE VARIABLES: SMOKING_STATUS: NO, I'VE NEVER SMOKED

## 2024-03-01 NOTE — PROGRESS NOTES
Reviewed questionnaire    Reviewed meds/allergies    Dx URI    Plan Rx given for amoxil, follow up with PCP if no improvement    Time spent on visit 11 min

## 2024-03-10 SDOH — ECONOMIC STABILITY: HOUSING INSECURITY
IN THE LAST 12 MONTHS, WAS THERE A TIME WHEN YOU DID NOT HAVE A STEADY PLACE TO SLEEP OR SLEPT IN A SHELTER (INCLUDING NOW)?: NO

## 2024-03-10 SDOH — ECONOMIC STABILITY: FOOD INSECURITY: WITHIN THE PAST 12 MONTHS, YOU WORRIED THAT YOUR FOOD WOULD RUN OUT BEFORE YOU GOT MONEY TO BUY MORE.: NEVER TRUE

## 2024-03-10 SDOH — ECONOMIC STABILITY: INCOME INSECURITY: HOW HARD IS IT FOR YOU TO PAY FOR THE VERY BASICS LIKE FOOD, HOUSING, MEDICAL CARE, AND HEATING?: SOMEWHAT HARD

## 2024-03-10 SDOH — ECONOMIC STABILITY: TRANSPORTATION INSECURITY
IN THE PAST 12 MONTHS, HAS LACK OF TRANSPORTATION KEPT YOU FROM MEETINGS, WORK, OR FROM GETTING THINGS NEEDED FOR DAILY LIVING?: NO

## 2024-03-10 SDOH — ECONOMIC STABILITY: FOOD INSECURITY: WITHIN THE PAST 12 MONTHS, THE FOOD YOU BOUGHT JUST DIDN'T LAST AND YOU DIDN'T HAVE MONEY TO GET MORE.: NEVER TRUE

## 2024-03-10 ASSESSMENT — PATIENT HEALTH QUESTIONNAIRE - PHQ9
7. TROUBLE CONCENTRATING ON THINGS, SUCH AS READING THE NEWSPAPER OR WATCHING TELEVISION: SEVERAL DAYS
5. POOR APPETITE OR OVEREATING: NEARLY EVERY DAY
4. FEELING TIRED OR HAVING LITTLE ENERGY: 3
SUM OF ALL RESPONSES TO PHQ QUESTIONS 1-9: 18
2. FEELING DOWN, DEPRESSED OR HOPELESS: 3
2. FEELING DOWN, DEPRESSED OR HOPELESS: NEARLY EVERY DAY
6. FEELING BAD ABOUT YOURSELF - OR THAT YOU ARE A FAILURE OR HAVE LET YOURSELF OR YOUR FAMILY DOWN: SEVERAL DAYS
1. LITTLE INTEREST OR PLEASURE IN DOING THINGS: 3
5. POOR APPETITE OR OVEREATING: 3
SUM OF ALL RESPONSES TO PHQ QUESTIONS 1-9: 18
SUM OF ALL RESPONSES TO PHQ QUESTIONS 1-9: 18
3. TROUBLE FALLING OR STAYING ASLEEP: 3
8. MOVING OR SPEAKING SO SLOWLY THAT OTHER PEOPLE COULD HAVE NOTICED. OR THE OPPOSITE - BEING SO FIDGETY OR RESTLESS THAT YOU HAVE BEEN MOVING AROUND A LOT MORE THAN USUAL: SEVERAL DAYS
SUM OF ALL RESPONSES TO PHQ QUESTIONS 1-9: 18
4. FEELING TIRED OR HAVING LITTLE ENERGY: NEARLY EVERY DAY
8. MOVING OR SPEAKING SO SLOWLY THAT OTHER PEOPLE COULD HAVE NOTICED. OR THE OPPOSITE, BEING SO FIGETY OR RESTLESS THAT YOU HAVE BEEN MOVING AROUND A LOT MORE THAN USUAL: 1
10. IF YOU CHECKED OFF ANY PROBLEMS, HOW DIFFICULT HAVE THESE PROBLEMS MADE IT FOR YOU TO DO YOUR WORK, TAKE CARE OF THINGS AT HOME, OR GET ALONG WITH OTHER PEOPLE: 2
SUM OF ALL RESPONSES TO PHQ9 QUESTIONS 1 & 2: 6
9. THOUGHTS THAT YOU WOULD BE BETTER OFF DEAD, OR OF HURTING YOURSELF: 0
10. IF YOU CHECKED OFF ANY PROBLEMS, HOW DIFFICULT HAVE THESE PROBLEMS MADE IT FOR YOU TO DO YOUR WORK, TAKE CARE OF THINGS AT HOME, OR GET ALONG WITH OTHER PEOPLE: VERY DIFFICULT
6. FEELING BAD ABOUT YOURSELF - OR THAT YOU ARE A FAILURE OR HAVE LET YOURSELF OR YOUR FAMILY DOWN: 1
9. THOUGHTS THAT YOU WOULD BE BETTER OFF DEAD, OR OF HURTING YOURSELF: NOT AT ALL
1. LITTLE INTEREST OR PLEASURE IN DOING THINGS: NEARLY EVERY DAY
3. TROUBLE FALLING OR STAYING ASLEEP: NEARLY EVERY DAY
SUM OF ALL RESPONSES TO PHQ QUESTIONS 1-9: 18
7. TROUBLE CONCENTRATING ON THINGS, SUCH AS READING THE NEWSPAPER OR WATCHING TELEVISION: 1

## 2024-03-11 ENCOUNTER — OFFICE VISIT (OUTPATIENT)
Dept: FAMILY MEDICINE CLINIC | Age: 34
End: 2024-03-11
Payer: COMMERCIAL

## 2024-03-11 VITALS
DIASTOLIC BLOOD PRESSURE: 72 MMHG | BODY MASS INDEX: 35.24 KG/M2 | OXYGEN SATURATION: 97 % | SYSTOLIC BLOOD PRESSURE: 134 MMHG | HEART RATE: 84 BPM | WEIGHT: 225 LBS

## 2024-03-11 DIAGNOSIS — F43.23 ADJUSTMENT DISORDER WITH MIXED ANXIETY AND DEPRESSED MOOD: Primary | ICD-10-CM

## 2024-03-11 DIAGNOSIS — Z00.00 ENCOUNTER FOR ANNUAL HEALTH EXAMINATION: ICD-10-CM

## 2024-03-11 DIAGNOSIS — Z83.49 FAMILY HISTORY OF HYPOTHYROIDISM: ICD-10-CM

## 2024-03-11 PROCEDURE — 99213 OFFICE O/P EST LOW 20 MIN: CPT | Performed by: FAMILY MEDICINE

## 2024-03-11 RX ORDER — ESCITALOPRAM OXALATE 10 MG/1
10 TABLET ORAL DAILY
Qty: 30 TABLET | Refills: 0 | Status: SHIPPED | OUTPATIENT
Start: 2024-03-11

## 2024-03-11 ASSESSMENT — ENCOUNTER SYMPTOMS
BACK PAIN: 0
SHORTNESS OF BREATH: 0
ABDOMINAL PAIN: 0
NAUSEA: 0
EYE ITCHING: 0
WHEEZING: 0
ANAL BLEEDING: 0
SINUS PRESSURE: 0
SORE THROAT: 0
DIARRHEA: 0
BLOOD IN STOOL: 0
ABDOMINAL DISTENTION: 0
RHINORRHEA: 0
CONSTIPATION: 0
CHEST TIGHTNESS: 0
EYE REDNESS: 0
EYE PAIN: 0
VOMITING: 0
VOICE CHANGE: 0
COUGH: 0
EYE DISCHARGE: 0
TROUBLE SWALLOWING: 0

## 2024-03-11 NOTE — PROGRESS NOTES
Take 1 tablet by mouth daily    Chart reviewed for medication, labs and any consultations        Marc Verdin,

## 2024-04-18 ENCOUNTER — PATIENT MESSAGE (OUTPATIENT)
Dept: FAMILY MEDICINE CLINIC | Age: 34
End: 2024-04-18

## 2024-04-18 NOTE — TELEPHONE ENCOUNTER
Left message for patient to call the office.    Dicyclomine was last ordered in 2013.    Called to schedule office visit or virtual visit to discuss symptoms.    If patient calls back please schedule.

## 2024-04-18 NOTE — TELEPHONE ENCOUNTER
From: Conchita Echeverria  To: Dr. Marc Verdin  Sent: 4/18/2024 11:04 AM EDT  Subject: Recent visit     Hi Dr. JERNIGAN,   I didn't end up starting the Lexapro after last visit. I've been trying to be more active and this has helped a lot with my anxiety and grief reaction. I am having some trouble with my IBS flaring up more. Could you call in [facility] for me? I am going to get my lab work completed in the next week or two.     Thanks,  Tona

## 2024-04-20 RX ORDER — DICYCLOMINE HYDROCHLORIDE 10 MG/1
CAPSULE ORAL
Qty: 60 CAPSULE | Refills: 0 | Status: SHIPPED | OUTPATIENT
Start: 2024-04-20

## 2024-05-09 ENCOUNTER — PATIENT MESSAGE (OUTPATIENT)
Dept: FAMILY MEDICINE CLINIC | Age: 34
End: 2024-05-09

## 2024-05-09 DIAGNOSIS — K58.9 IRRITABLE BOWEL SYNDROME, UNSPECIFIED TYPE: Primary | ICD-10-CM

## 2024-05-09 NOTE — TELEPHONE ENCOUNTER
From: Conchita Echeverria  To: Dr. Marc Verdin  Sent: 5/9/2024 1:46 PM EDT  Subject: Gabrielle JERNIGAN,   I've been taking bentyl consistently for about 10 days now. I don't feel or see much of a difference on the 10 mg. Can the dosage be increased?    Thanks

## 2024-06-15 ENCOUNTER — E-VISIT (OUTPATIENT)
Dept: PRIMARY CARE CLINIC | Age: 34
End: 2024-06-15

## 2024-06-15 DIAGNOSIS — M54.9 ACUTE BACK PAIN, UNSPECIFIED BACK LOCATION, UNSPECIFIED BACK PAIN LATERALITY: Primary | ICD-10-CM

## 2024-06-15 RX ORDER — METHYLPREDNISOLONE 4 MG/1
TABLET ORAL
Qty: 1 KIT | Refills: 0 | Status: SHIPPED | OUTPATIENT
Start: 2024-06-15 | End: 2024-06-21

## 2024-06-15 NOTE — PROGRESS NOTES
Reviewed questionnaire    Reviewed meds/allergies    Dx Lower back pain    Plan Rx given for medrol dose pack, follow up with PCP if no improvement    Time spent on visit 11 min

## 2024-06-28 DIAGNOSIS — N95.9 PERIMENOPAUSAL DISORDER: Primary | ICD-10-CM

## 2024-07-11 ENCOUNTER — HOSPITAL ENCOUNTER (OUTPATIENT)
Age: 34
Discharge: HOME | End: 2024-07-11
Payer: COMMERCIAL

## 2024-07-11 DIAGNOSIS — N95.9 PERIMENOPAUSAL DISORDER: ICD-10-CM

## 2024-07-11 LAB
ALBUMIN SERPL-MCNC: 4.1 G/DL (ref 3.4–5)
ALBUMIN/GLOB SERPL: 1.5 {RATIO} (ref 1.1–2.2)
ALP SERPL-CCNC: 83 U/L (ref 40–129)
ALT SERPL-CCNC: 62 U/L (ref 10–40)
ANION GAP SERPL CALCULATED.3IONS-SCNC: 10 MMOL/L (ref 3–16)
AST SERPL-CCNC: 41 U/L (ref 15–37)
BASOPHILS # BLD: 0 K/UL (ref 0–0.2)
BASOPHILS NFR BLD: 0.7 %
BILIRUB SERPL-MCNC: 0.3 MG/DL (ref 0–1)
BUN SERPL-MCNC: 15 MG/DL (ref 7–20)
CALCIUM SERPL-MCNC: 8.8 MG/DL (ref 8.3–10.6)
CHLORIDE SERPL-SCNC: 106 MMOL/L (ref 99–110)
CHOLEST SERPL-MCNC: 152 MG/DL (ref 0–199)
CO2 SERPL-SCNC: 25 MMOL/L (ref 21–32)
CREAT SERPL-MCNC: 0.7 MG/DL (ref 0.6–1.1)
DEPRECATED RDW RBC AUTO: 14 % (ref 12.4–15.4)
EOSINOPHIL # BLD: 0.2 K/UL (ref 0–0.6)
EOSINOPHIL NFR BLD: 2.2 %
ESTRADIOL SERPL-MCNC: 22 PG/ML
FSH SERPL-ACNC: 9.2 MIU/ML
GFR SERPLBLD CREATININE-BSD FMLA CKD-EPI: >90 ML/MIN/{1.73_M2}
GLUCOSE SERPL-MCNC: 91 MG/DL (ref 70–99)
HCT VFR BLD AUTO: 35.9 % (ref 36–48)
HDLC SERPL-MCNC: 47 MG/DL (ref 40–60)
HGB BLD-MCNC: 12.1 G/DL (ref 12–16)
LDLC SERPL CALC-MCNC: 96 MG/DL
LYMPHOCYTES # BLD: 1.1 K/UL (ref 1–5.1)
LYMPHOCYTES NFR BLD: 15.4 %
MCH RBC QN AUTO: 28.5 PG (ref 26–34)
MCHC RBC AUTO-ENTMCNC: 33.6 G/DL (ref 31–36)
MCV RBC AUTO: 84.9 FL (ref 80–100)
MONOCYTES # BLD: 0.3 K/UL (ref 0–1.3)
MONOCYTES NFR BLD: 4.3 %
NEUTROPHILS # BLD: 5.5 K/UL (ref 1.7–7.7)
NEUTROPHILS NFR BLD: 77.4 %
PLATELET # BLD AUTO: 236 K/UL (ref 135–450)
PMV BLD AUTO: 9.1 FL (ref 5–10.5)
POTASSIUM SERPL-SCNC: 4.6 MMOL/L (ref 3.5–5.1)
PROLACTIN SERPL IA-MCNC: 15.7 NG/ML
PROT SERPL-MCNC: 6.9 G/DL (ref 6.4–8.2)
RBC # BLD AUTO: 4.22 M/UL (ref 4–5.2)
SODIUM SERPL-SCNC: 141 MMOL/L (ref 136–145)
TRIGL SERPL-MCNC: 43 MG/DL (ref 0–150)
TSH SERPL DL<=0.005 MIU/L-ACNC: 3.67 UIU/ML (ref 0.27–4.2)
VLDLC SERPL CALC-MCNC: 9 MG/DL
WBC # BLD AUTO: 7.1 K/UL (ref 4–11)

## 2024-07-11 PROCEDURE — 84403 ASSAY OF TOTAL TESTOSTERONE: CPT

## 2024-07-11 PROCEDURE — 82670 ASSAY OF TOTAL ESTRADIOL: CPT

## 2024-07-11 PROCEDURE — 84146 ASSAY OF PROLACTIN: CPT

## 2024-07-11 PROCEDURE — 84270 ASSAY OF SEX HORMONE GLOBUL: CPT

## 2024-07-11 PROCEDURE — 36415 COLL VENOUS BLD VENIPUNCTURE: CPT

## 2024-07-11 PROCEDURE — 80061 LIPID PANEL: CPT

## 2024-07-11 PROCEDURE — 84443 ASSAY THYROID STIM HORMONE: CPT

## 2024-07-11 PROCEDURE — 80053 COMPREHEN METABOLIC PANEL: CPT

## 2024-07-11 PROCEDURE — 85025 COMPLETE CBC W/AUTO DIFF WBC: CPT

## 2024-07-11 PROCEDURE — 83001 ASSAY OF GONADOTROPIN (FSH): CPT

## 2024-07-12 LAB
SHBG SERPL-SCNC: 41 NMOL/L (ref 25–122)
TESTOST FREE SERPL-MCNC: 5.2 PG/ML (ref 1.3–9.2)
TESTOST SERPL-MCNC: 33 NG/DL (ref 8–48)

## 2024-07-17 ENCOUNTER — OFFICE VISIT (OUTPATIENT)
Dept: FAMILY MEDICINE CLINIC | Age: 34
End: 2024-07-17
Payer: COMMERCIAL

## 2024-07-17 VITALS
DIASTOLIC BLOOD PRESSURE: 78 MMHG | HEART RATE: 86 BPM | TEMPERATURE: 98.7 F | BODY MASS INDEX: 37.29 KG/M2 | WEIGHT: 237.6 LBS | HEIGHT: 67 IN | SYSTOLIC BLOOD PRESSURE: 118 MMHG | OXYGEN SATURATION: 98 %

## 2024-07-17 DIAGNOSIS — R53.83 OTHER FATIGUE: ICD-10-CM

## 2024-07-17 DIAGNOSIS — J06.9 VIRAL URI: Primary | ICD-10-CM

## 2024-07-17 DIAGNOSIS — J06.9 VIRAL URI: ICD-10-CM

## 2024-07-17 DIAGNOSIS — A49.2 HAEMOPHILUS INFECTION: ICD-10-CM

## 2024-07-17 LAB
HETEROPHILE ANTIBODIES: NEGATIVE
INFLUENZA A ANTIBODY: NORMAL
INFLUENZA B ANTIBODY: NORMAL

## 2024-07-17 PROCEDURE — 86308 HETEROPHILE ANTIBODY SCREEN: CPT | Performed by: NURSE PRACTITIONER

## 2024-07-17 PROCEDURE — 87804 INFLUENZA ASSAY W/OPTIC: CPT | Performed by: NURSE PRACTITIONER

## 2024-07-17 ASSESSMENT — ENCOUNTER SYMPTOMS
NAUSEA: 0
SORE THROAT: 1
ABDOMINAL PAIN: 1
COUGH: 0
VOMITING: 0
RHINORRHEA: 0
SINUS PRESSURE: 0
SHORTNESS OF BREATH: 0
SINUS PAIN: 0
DIARRHEA: 0

## 2024-07-17 NOTE — PROGRESS NOTES
Conchita Echeverria (:  1990) is a 34 y.o. female,Established patient, here for evaluation of the following chief complaint(s):  Pharyngitis (X 2 days), Fever, Fatigue, and Generalized Body Aches (X 3 days. Has taken ibuprofen )      Assessment & Plan   ASSESSMENT/PLAN:  1. Viral URI  -     POCT Influenza A/B  -     POCT Infectious mononucleosis Abs (mono)  -     MONONUCLEOSIS SCREEN; Future  -     Culture, Throat  2. Other fatigue  -     POCT Influenza A/B  -     POCT Infectious mononucleosis Abs (mono)  -     MONONUCLEOSIS SCREEN; Future  -     Culture, Throat    -Discussed potential length and course of viral illness. Antibiotics not indicated at this time, antibiotics are not indicated in a viral illness.  Discussed this information with the patient who verbalized understanding.    -Rapid mono negative, will send for blood work to confirm given extreme fatigue and history. If positive, patient aware there are no antibiotics to treat this. Rest, hydration and reducing risk of contact sports are treatments.   -Rapid flu negative   -Throat culture pending to look for Haemophilus or other causes   -Patient's allergies reviewed, reports she has taken Augmentin and Amoxicillin the past with no problems as well as Azithromycin. If culture positive will send medication as appropriate.   -Reviewed allergies, discussed medication risks, benefits, side effects. Take medication with food.   -Reviewed symptom management   -Reviewed alarm symptoms    Patient Instructions   Drink plenty of fluids   Get plenty of rest  Honey is a natural cough suppressant, may take a spoonful of honey by itself or mix it in warm tea   Humidifier, steamy showers  Flonase daily, normal saline nasal spray throughout the day   Tylenol or Ibuprofen per package directions as needed   Mucinex or Coricidin per package directions as needed   Notify office if symptoms worsen or do not improve   Go to nearest ER if develop shortness of breath, chest

## 2024-07-18 LAB — HETEROPH AB BLD QL IA: NEGATIVE

## 2024-07-19 ENCOUNTER — PATIENT MESSAGE (OUTPATIENT)
Dept: FAMILY MEDICINE CLINIC | Age: 34
End: 2024-07-19

## 2024-07-19 DIAGNOSIS — R11.2 NAUSEA AND VOMITING, UNSPECIFIED VOMITING TYPE: Primary | ICD-10-CM

## 2024-07-19 RX ORDER — AMOXICILLIN AND CLAVULANATE POTASSIUM 875; 125 MG/1; MG/1
1 TABLET, FILM COATED ORAL 2 TIMES DAILY
Qty: 14 TABLET | Refills: 0 | Status: SHIPPED | OUTPATIENT
Start: 2024-07-19 | End: 2024-07-26

## 2024-07-19 RX ORDER — SULFAMETHOXAZOLE AND TRIMETHOPRIM 800; 160 MG/1; MG/1
1 TABLET ORAL 2 TIMES DAILY
Qty: 14 TABLET | Refills: 0 | Status: SHIPPED | OUTPATIENT
Start: 2024-07-19 | End: 2024-07-26

## 2024-07-20 LAB
BACTERIA THROAT AEROBE CULT: ABNORMAL
ORGANISM: ABNORMAL
ORGANISM: ABNORMAL

## 2024-07-21 ENCOUNTER — E-VISIT (OUTPATIENT)
Dept: PRIMARY CARE CLINIC | Age: 34
End: 2024-07-21
Payer: COMMERCIAL

## 2024-07-21 DIAGNOSIS — R23.2 RED FACE: Primary | ICD-10-CM

## 2024-07-21 PROCEDURE — 99422 OL DIG E/M SVC 11-20 MIN: CPT | Performed by: NURSE PRACTITIONER

## 2024-07-21 NOTE — PROGRESS NOTES
Conchita Echeverria (1990) initiated an asynchronous digital communication through [employer].    HPI: per patient questionnaire     Exam: not applicable    Diagnoses and all orders for this visit:  Diagnoses and all orders for this visit:    Red face    Reviewed photos. Pt has been on 2 different antibiotics recently. Unsure if having an allergic reaction. Difficult to see in photos. Recommend c/w antibiotics and continuing to monitor. Encourage f/u with pcp this week     Time: EV2 - 11-20 minutes were spent on the digital evaluation and management of this patient. 18 min     Adrienne Arroyo, GINA - CNP

## 2024-07-22 RX ORDER — ONDANSETRON 4 MG/1
4 TABLET, ORALLY DISINTEGRATING ORAL 3 TIMES DAILY PRN
Qty: 30 TABLET | Refills: 0 | Status: SHIPPED | OUTPATIENT
Start: 2024-07-22 | End: 2024-08-01

## 2024-07-22 NOTE — TELEPHONE ENCOUNTER
From: Conchita Echeverria  To: Mariejorge WhiteKaur  Sent: 7/19/2024 4:55 PM EDT  Subject: Antibiotics     Hi Marie,   I am only one dose in on the antibiotics and can't stay out of the bathroom. Anything I eat or drink comes right back out. I'm absolutely miserable. Is there anything I can take to help with that? I don't recall ever being on 2 antibiotics at the same time. I did  some yogurt and probiotics to hopefully help things. Any suggestions to get through this next week?

## 2024-07-30 ENCOUNTER — TELEPHONE (OUTPATIENT)
Dept: FAMILY MEDICINE CLINIC | Age: 34
End: 2024-07-30

## 2024-07-30 NOTE — TELEPHONE ENCOUNTER
Called and no answer, couldn't leave message due to VM is full. Wanted to let her know Dr. Verdin signed her [employer] form and I faxed to # provided as well as scanned it into her chart. When patient calls back please let her know. Thanks!

## 2024-07-31 NOTE — TELEPHONE ENCOUNTER
Spoke with patient and let her know form is complete and faxed. Mailing her a copy per her request.

## 2024-08-28 ENCOUNTER — E-VISIT (OUTPATIENT)
Dept: PRIMARY CARE CLINIC | Age: 34
End: 2024-08-28
Payer: COMMERCIAL

## 2024-08-28 DIAGNOSIS — B34.9 VIRAL ILLNESS: Primary | ICD-10-CM

## 2024-08-28 PROCEDURE — 99422 OL DIG E/M SVC 11-20 MIN: CPT | Performed by: NURSE PRACTITIONER

## 2024-08-28 RX ORDER — METHYLPREDNISOLONE 4 MG
TABLET, DOSE PACK ORAL
Qty: 1 KIT | Refills: 0 | Status: SHIPPED | OUTPATIENT
Start: 2024-08-28 | End: 2024-09-03

## 2024-08-28 ASSESSMENT — LIFESTYLE VARIABLES: SMOKING_STATUS: NO, I'VE NEVER SMOKED

## 2024-08-28 NOTE — PROGRESS NOTES
Reviewed questionnaire    Reviewed meds/allergies    Dx Viral illness    Plan Symptoms x 2 days. Rest/fluids. Rx given for medrol dose pack, follow up with PCP if no improvement    Time spent on visit 11 min

## 2024-09-22 ENCOUNTER — E-VISIT (OUTPATIENT)
Dept: PRIMARY CARE CLINIC | Age: 34
End: 2024-09-22

## 2024-09-22 DIAGNOSIS — M54.9 ACUTE BACK PAIN, UNSPECIFIED BACK LOCATION, UNSPECIFIED BACK PAIN LATERALITY: Primary | ICD-10-CM

## 2024-09-23 ENCOUNTER — APPOINTMENT (OUTPATIENT)
Dept: ULTRASOUND IMAGING | Age: 34
End: 2024-09-23
Payer: COMMERCIAL

## 2024-09-23 ENCOUNTER — APPOINTMENT (OUTPATIENT)
Dept: CT IMAGING | Age: 34
End: 2024-09-23
Payer: COMMERCIAL

## 2024-09-23 ENCOUNTER — HOSPITAL ENCOUNTER (EMERGENCY)
Age: 34
Discharge: HOME | End: 2024-09-23
Attending: EMERGENCY MEDICINE
Payer: COMMERCIAL

## 2024-09-23 VITALS
HEART RATE: 82 BPM | OXYGEN SATURATION: 99 % | TEMPERATURE: 98.3 F | WEIGHT: 235 LBS | BODY MASS INDEX: 36.88 KG/M2 | HEIGHT: 67 IN | RESPIRATION RATE: 17 BRPM | SYSTOLIC BLOOD PRESSURE: 115 MMHG | DIASTOLIC BLOOD PRESSURE: 71 MMHG

## 2024-09-23 DIAGNOSIS — R10.30 LOWER ABDOMINAL PAIN: Primary | ICD-10-CM

## 2024-09-23 DIAGNOSIS — R11.2 NAUSEA AND VOMITING, UNSPECIFIED VOMITING TYPE: ICD-10-CM

## 2024-09-23 DIAGNOSIS — D25.9 UTERINE LEIOMYOMA, UNSPECIFIED LOCATION: ICD-10-CM

## 2024-09-23 DIAGNOSIS — N83.201 RIGHT OVARIAN CYST: ICD-10-CM

## 2024-09-23 DIAGNOSIS — N83.202 LEFT OVARIAN CYST: ICD-10-CM

## 2024-09-23 LAB
ALBUMIN SERPL-MCNC: 3.9 G/DL (ref 3.4–5)
ALBUMIN/GLOB SERPL: 1.2 {RATIO} (ref 1.1–2.2)
ALP SERPL-CCNC: 88 U/L (ref 40–129)
ALT SERPL-CCNC: 14 U/L (ref 10–40)
ANION GAP SERPL CALCULATED.3IONS-SCNC: 10 MMOL/L (ref 3–16)
AST SERPL-CCNC: 29 U/L (ref 15–37)
BACTERIA GENITAL QL WET PREP: NORMAL
BACTERIA URNS QL MICRO: ABNORMAL /HPF
BASOPHILS # BLD: 0 K/UL (ref 0–0.2)
BASOPHILS NFR BLD: 0.3 %
BILIRUB SERPL-MCNC: 0.8 MG/DL (ref 0–1)
BILIRUB UR QL STRIP.AUTO: NEGATIVE
BUN SERPL-MCNC: 8 MG/DL (ref 7–20)
CALCIUM SERPL-MCNC: 8.8 MG/DL (ref 8.3–10.6)
CHLORIDE SERPL-SCNC: 100 MMOL/L (ref 99–110)
CLARITY UR: ABNORMAL
CLUE CELLS SPEC QL WET PREP: NORMAL
CO2 SERPL-SCNC: 24 MMOL/L (ref 21–32)
COLOR UR: YELLOW
CREAT SERPL-MCNC: 0.6 MG/DL (ref 0.6–1.1)
DEPRECATED RDW RBC AUTO: 14.4 % (ref 12.4–15.4)
EOSINOPHIL # BLD: 0.1 K/UL (ref 0–0.6)
EOSINOPHIL NFR BLD: 0.6 %
EPI CELLS #/AREA URNS HPF: ABNORMAL /HPF (ref 0–5)
EPI CELLS SPEC QL WET PREP: NORMAL
GFR SERPLBLD CREATININE-BSD FMLA CKD-EPI: >90 ML/MIN/{1.73_M2}
GLUCOSE SERPL-MCNC: 103 MG/DL (ref 70–99)
GLUCOSE UR STRIP.AUTO-MCNC: NEGATIVE MG/DL
HCG SERPL QL: NEGATIVE
HCT VFR BLD AUTO: 36 % (ref 36–48)
HGB BLD-MCNC: 11.7 G/DL (ref 12–16)
HGB UR QL STRIP.AUTO: ABNORMAL
KETONES UR STRIP.AUTO-MCNC: 15 MG/DL
LACTATE BLDV-SCNC: 0.7 MMOL/L (ref 0.4–2)
LEUKOCYTE ESTERASE UR QL STRIP.AUTO: ABNORMAL
LIPASE SERPL-CCNC: 12 U/L (ref 13–60)
LYMPHOCYTES # BLD: 1.6 K/UL (ref 1–5.1)
LYMPHOCYTES NFR BLD: 12.9 %
MCH RBC QN AUTO: 27.3 PG (ref 26–34)
MCHC RBC AUTO-ENTMCNC: 32.5 G/DL (ref 31–36)
MCV RBC AUTO: 84.1 FL (ref 80–100)
MONOCYTES # BLD: 0.8 K/UL (ref 0–1.3)
MONOCYTES NFR BLD: 6.2 %
NEUTROPHILS # BLD: 9.9 K/UL (ref 1.7–7.7)
NEUTROPHILS NFR BLD: 80 %
NITRITE UR QL STRIP.AUTO: NEGATIVE
PH UR STRIP.AUTO: 8 [PH] (ref 5–8)
PLATELET # BLD AUTO: 253 K/UL (ref 135–450)
PMV BLD AUTO: 8.6 FL (ref 5–10.5)
POTASSIUM SERPL-SCNC: 4.5 MMOL/L (ref 3.5–5.1)
PROT SERPL-MCNC: 7.2 G/DL (ref 6.4–8.2)
PROT UR STRIP.AUTO-MCNC: NEGATIVE MG/DL
RBC # BLD AUTO: 4.28 M/UL (ref 4–5.2)
RBC #/AREA URNS HPF: ABNORMAL /HPF (ref 0–4)
RBC SPEC QL WET PREP: NORMAL
SODIUM SERPL-SCNC: 134 MMOL/L (ref 136–145)
SP GR UR STRIP.AUTO: 1.01 (ref 1–1.03)
SPECIMEN SOURCE FLD: NORMAL
T VAGINALIS GENITAL QL WET PREP: NORMAL
UA COMPLETE W REFLEX CULTURE PNL UR: YES
UA DIPSTICK W REFLEX MICRO PNL UR: YES
URN SPEC COLLECT METH UR: ABNORMAL
UROBILINOGEN UR STRIP-ACNC: 0.2 E.U./DL
WBC # BLD AUTO: 12.3 K/UL (ref 4–11)
WBC #/AREA URNS HPF: ABNORMAL /HPF (ref 0–5)
WBC SPEC QL WET PREP: NORMAL
YEAST GENITAL QL WET PREP: NORMAL

## 2024-09-23 PROCEDURE — 83690 ASSAY OF LIPASE: CPT

## 2024-09-23 PROCEDURE — 74177 CT ABD & PELVIS W/CONTRAST: CPT

## 2024-09-23 PROCEDURE — 83605 ASSAY OF LACTIC ACID: CPT

## 2024-09-23 PROCEDURE — 87210 SMEAR WET MOUNT SALINE/INK: CPT

## 2024-09-23 PROCEDURE — 96374 THER/PROPH/DIAG INJ IV PUSH: CPT

## 2024-09-23 PROCEDURE — 81001 URINALYSIS AUTO W/SCOPE: CPT

## 2024-09-23 PROCEDURE — 6360000002 HC RX W HCPCS: Performed by: PHYSICIAN ASSISTANT

## 2024-09-23 PROCEDURE — 84703 CHORIONIC GONADOTROPIN ASSAY: CPT

## 2024-09-23 PROCEDURE — 2580000003 HC RX 258: Performed by: PHYSICIAN ASSISTANT

## 2024-09-23 PROCEDURE — 6360000004 HC RX CONTRAST MEDICATION: Performed by: PHYSICIAN ASSISTANT

## 2024-09-23 PROCEDURE — 96376 TX/PRO/DX INJ SAME DRUG ADON: CPT

## 2024-09-23 PROCEDURE — 99285 EMERGENCY DEPT VISIT HI MDM: CPT

## 2024-09-23 PROCEDURE — 96375 TX/PRO/DX INJ NEW DRUG ADDON: CPT

## 2024-09-23 PROCEDURE — 80053 COMPREHEN METABOLIC PANEL: CPT

## 2024-09-23 PROCEDURE — 36415 COLL VENOUS BLD VENIPUNCTURE: CPT

## 2024-09-23 PROCEDURE — 6360000002 HC RX W HCPCS: Performed by: EMERGENCY MEDICINE

## 2024-09-23 PROCEDURE — 85025 COMPLETE CBC W/AUTO DIFF WBC: CPT

## 2024-09-23 PROCEDURE — 87591 N.GONORRHOEAE DNA AMP PROB: CPT

## 2024-09-23 PROCEDURE — 87491 CHLMYD TRACH DNA AMP PROBE: CPT

## 2024-09-23 PROCEDURE — 76830 TRANSVAGINAL US NON-OB: CPT

## 2024-09-23 PROCEDURE — 87086 URINE CULTURE/COLONY COUNT: CPT

## 2024-09-23 RX ORDER — ONDANSETRON 2 MG/ML
4 INJECTION INTRAMUSCULAR; INTRAVENOUS ONCE
Status: COMPLETED | OUTPATIENT
Start: 2024-09-23 | End: 2024-09-23

## 2024-09-23 RX ORDER — OXYCODONE AND ACETAMINOPHEN 5; 325 MG/1; MG/1
1 TABLET ORAL EVERY 6 HOURS PRN
Qty: 4 TABLET | Refills: 0 | Status: SHIPPED | OUTPATIENT
Start: 2024-09-23 | End: 2024-09-24

## 2024-09-23 RX ORDER — ONDANSETRON 4 MG/1
4 TABLET, FILM COATED ORAL 3 TIMES DAILY PRN
Qty: 15 TABLET | Refills: 0 | Status: SHIPPED | OUTPATIENT
Start: 2024-09-23

## 2024-09-23 RX ORDER — 0.9 % SODIUM CHLORIDE 0.9 %
1000 INTRAVENOUS SOLUTION INTRAVENOUS ONCE
Status: COMPLETED | OUTPATIENT
Start: 2024-09-23 | End: 2024-09-23

## 2024-09-23 RX ORDER — IOPAMIDOL 755 MG/ML
75 INJECTION, SOLUTION INTRAVASCULAR
Status: COMPLETED | OUTPATIENT
Start: 2024-09-23 | End: 2024-09-23

## 2024-09-23 RX ORDER — KETOROLAC TROMETHAMINE 10 MG/1
10 TABLET, FILM COATED ORAL EVERY 6 HOURS PRN
Qty: 20 TABLET | Refills: 0 | Status: SHIPPED | OUTPATIENT
Start: 2024-09-23 | End: 2025-09-23

## 2024-09-23 RX ADMIN — HYDROMORPHONE HYDROCHLORIDE 0.5 MG: 1 INJECTION, SOLUTION INTRAMUSCULAR; INTRAVENOUS; SUBCUTANEOUS at 14:01

## 2024-09-23 RX ADMIN — HYDROMORPHONE HYDROCHLORIDE 0.5 MG: 1 INJECTION, SOLUTION INTRAMUSCULAR; INTRAVENOUS; SUBCUTANEOUS at 16:28

## 2024-09-23 RX ADMIN — SODIUM CHLORIDE 1000 ML: 9 INJECTION, SOLUTION INTRAVENOUS at 13:50

## 2024-09-23 RX ADMIN — ONDANSETRON 4 MG: 2 INJECTION INTRAMUSCULAR; INTRAVENOUS at 14:00

## 2024-09-23 RX ADMIN — IOPAMIDOL 75 ML: 755 INJECTION, SOLUTION INTRAVENOUS at 14:14

## 2024-09-23 ASSESSMENT — PAIN - FUNCTIONAL ASSESSMENT: PAIN_FUNCTIONAL_ASSESSMENT: 0-10

## 2024-09-23 ASSESSMENT — PAIN SCALES - GENERAL
PAINLEVEL_OUTOF10: 7
PAINLEVEL_OUTOF10: 5
PAINLEVEL_OUTOF10: 7
PAINLEVEL_OUTOF10: 7

## 2024-09-24 ENCOUNTER — CARE COORDINATION (OUTPATIENT)
Dept: OTHER | Facility: CLINIC | Age: 34
End: 2024-09-24

## 2024-09-24 LAB
BACTERIA UR CULT: NORMAL
C TRACH DNA CVX QL NAA+PROBE: NEGATIVE
N GONORRHOEA DNA CERV MUCUS QL NAA+PROBE: NEGATIVE

## 2024-09-25 ENCOUNTER — CARE COORDINATION (OUTPATIENT)
Dept: OTHER | Facility: CLINIC | Age: 34
End: 2024-09-25

## 2024-10-23 ENCOUNTER — CARE COORDINATION (OUTPATIENT)
Dept: OTHER | Facility: CLINIC | Age: 34
End: 2024-10-23

## 2024-10-23 NOTE — CARE COORDINATION
Ambulatory Care Coordination Note     10/23/2024 12:29 PM     Final patient outreach attempt by this ACM today to offer care management services. ACM was unable to reach the patient by telephone today;  Final UTR letter sent to patient.     Patient closed (unable to reach patient) from the High Risk Care Management program on 10/23/2024.  No further Ambulatory Care Manager follow up scheduled.

## 2024-11-12 SDOH — HEALTH STABILITY: PHYSICAL HEALTH: ON AVERAGE, HOW MANY MINUTES DO YOU ENGAGE IN EXERCISE AT THIS LEVEL?: 20 MIN

## 2024-11-12 SDOH — HEALTH STABILITY: PHYSICAL HEALTH: ON AVERAGE, HOW MANY DAYS PER WEEK DO YOU ENGAGE IN MODERATE TO STRENUOUS EXERCISE (LIKE A BRISK WALK)?: 4 DAYS

## 2024-11-13 ENCOUNTER — OFFICE VISIT (OUTPATIENT)
Dept: INTERNAL MEDICINE CLINIC | Age: 34
End: 2024-11-13
Payer: COMMERCIAL

## 2024-11-13 VITALS
TEMPERATURE: 97.7 F | HEIGHT: 66 IN | DIASTOLIC BLOOD PRESSURE: 78 MMHG | BODY MASS INDEX: 37.41 KG/M2 | OXYGEN SATURATION: 98 % | HEART RATE: 76 BPM | WEIGHT: 232.8 LBS | SYSTOLIC BLOOD PRESSURE: 108 MMHG

## 2024-11-13 DIAGNOSIS — Z82.49 FH: CAD (CORONARY ARTERY DISEASE): ICD-10-CM

## 2024-11-13 DIAGNOSIS — F43.0 ANXIETY AS ACUTE REACTION TO GROSS STRESS: Primary | ICD-10-CM

## 2024-11-13 DIAGNOSIS — F41.1 ANXIETY AS ACUTE REACTION TO GROSS STRESS: Primary | ICD-10-CM

## 2024-11-13 PROCEDURE — 99214 OFFICE O/P EST MOD 30 MIN: CPT | Performed by: NURSE PRACTITIONER

## 2024-11-13 RX ORDER — BUSPIRONE HYDROCHLORIDE 5 MG/1
5 TABLET ORAL 3 TIMES DAILY PRN
Qty: 60 TABLET | Refills: 1 | Status: SHIPPED | OUTPATIENT
Start: 2024-11-13 | End: 2025-01-12

## 2024-11-13 ASSESSMENT — ENCOUNTER SYMPTOMS
ABDOMINAL DISTENTION: 0
NAUSEA: 0
SHORTNESS OF BREATH: 0
VOMITING: 0
CONSTIPATION: 0
DIARRHEA: 0
CHEST TIGHTNESS: 0
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
## 2024-12-02 DIAGNOSIS — R06.83 SNORING: Primary | ICD-10-CM

## 2024-12-30 ENCOUNTER — TELEPHONE (OUTPATIENT)
Dept: INTERNAL MEDICINE CLINIC | Age: 34
End: 2024-12-30

## 2024-12-30 DIAGNOSIS — Z87.42 HISTORY OF OVARIAN CYST: Primary | ICD-10-CM

## 2024-12-30 NOTE — TELEPHONE ENCOUNTER
Patient would like you to order the US Pelvis Complete?  Pain in increasing .  Wants to know if you can order STAT     Pended

## 2024-12-31 ENCOUNTER — HOSPITAL ENCOUNTER (OUTPATIENT)
Dept: ULTRASOUND IMAGING | Age: 34
Discharge: HOME | End: 2024-12-31
Payer: COMMERCIAL

## 2024-12-31 DIAGNOSIS — Z87.42 HISTORY OF OVARIAN CYST: ICD-10-CM

## 2024-12-31 PROCEDURE — 76830 TRANSVAGINAL US NON-OB: CPT

## 2025-01-07 ENCOUNTER — TELEPHONE (OUTPATIENT)
Dept: INTERNAL MEDICINE CLINIC | Age: 35
End: 2025-01-07

## 2025-01-07 DIAGNOSIS — R06.02 SOB (SHORTNESS OF BREATH) ON EXERTION: Primary | ICD-10-CM

## 2025-01-07 DIAGNOSIS — R06.02 SOB (SHORTNESS OF BREATH) ON EXERTION: ICD-10-CM

## 2025-01-07 LAB
HCT VFR BLD AUTO: 38.5 % (ref 36–48)
HGB BLD-MCNC: 12.4 G/DL (ref 12–16)

## 2025-01-07 NOTE — TELEPHONE ENCOUNTER
Pt reports feeling like she weighs 500 /lbs and also c/o SOB when she walks.  Spoke with Dr. Correia who ordered an H&H

## 2025-01-08 ENCOUNTER — OFFICE VISIT (OUTPATIENT)
Dept: INTERNAL MEDICINE CLINIC | Age: 35
End: 2025-01-08
Payer: COMMERCIAL

## 2025-01-08 VITALS
WEIGHT: 243.6 LBS | TEMPERATURE: 97.5 F | DIASTOLIC BLOOD PRESSURE: 70 MMHG | HEIGHT: 66 IN | OXYGEN SATURATION: 98 % | HEART RATE: 84 BPM | SYSTOLIC BLOOD PRESSURE: 108 MMHG | BODY MASS INDEX: 39.15 KG/M2

## 2025-01-08 DIAGNOSIS — R10.84 GENERALIZED ABDOMINAL PAIN: ICD-10-CM

## 2025-01-08 DIAGNOSIS — R10.84 GENERALIZED ABDOMINAL PAIN: Primary | ICD-10-CM

## 2025-01-08 LAB
ALBUMIN SERPL-MCNC: 4 G/DL (ref 3.4–5)
ALBUMIN/GLOB SERPL: 1.4 {RATIO} (ref 1.1–2.2)
ALP SERPL-CCNC: 67 U/L (ref 40–129)
ALT SERPL-CCNC: 16 U/L (ref 10–40)
ANION GAP SERPL CALCULATED.3IONS-SCNC: 9 MMOL/L (ref 3–16)
AST SERPL-CCNC: 16 U/L (ref 15–37)
BILIRUB SERPL-MCNC: 0.3 MG/DL (ref 0–1)
BUN SERPL-MCNC: 13 MG/DL (ref 7–20)
CALCIUM SERPL-MCNC: 9 MG/DL (ref 8.3–10.6)
CHLORIDE SERPL-SCNC: 103 MMOL/L (ref 99–110)
CO2 SERPL-SCNC: 24 MMOL/L (ref 21–32)
CREAT SERPL-MCNC: 0.7 MG/DL (ref 0.6–1.1)
CRP SERPL-MCNC: 12 MG/L (ref 0–5.1)
ERYTHROCYTE [SEDIMENTATION RATE] IN BLOOD BY WESTERGREN METHOD: 22 MM/HR (ref 0–20)
GFR SERPLBLD CREATININE-BSD FMLA CKD-EPI: >90 ML/MIN/{1.73_M2}
GLUCOSE SERPL-MCNC: 99 MG/DL (ref 70–99)
LIPASE SERPL-CCNC: 32 U/L (ref 13–60)
POTASSIUM SERPL-SCNC: 4.5 MMOL/L (ref 3.5–5.1)
PROT SERPL-MCNC: 6.8 G/DL (ref 6.4–8.2)
SODIUM SERPL-SCNC: 136 MMOL/L (ref 136–145)
T3FREE SERPL-MCNC: 3.1 PG/ML (ref 2.3–4.2)
T4 FREE SERPL-MCNC: 0.9 NG/DL (ref 0.9–1.8)
TSH SERPL DL<=0.005 MIU/L-ACNC: 1.54 UIU/ML (ref 0.27–4.2)

## 2025-01-08 PROCEDURE — 99213 OFFICE O/P EST LOW 20 MIN: CPT | Performed by: NURSE PRACTITIONER

## 2025-01-08 SDOH — ECONOMIC STABILITY: FOOD INSECURITY: WITHIN THE PAST 12 MONTHS, YOU WORRIED THAT YOUR FOOD WOULD RUN OUT BEFORE YOU GOT MONEY TO BUY MORE.: NEVER TRUE

## 2025-01-08 SDOH — ECONOMIC STABILITY: FOOD INSECURITY: WITHIN THE PAST 12 MONTHS, THE FOOD YOU BOUGHT JUST DIDN'T LAST AND YOU DIDN'T HAVE MONEY TO GET MORE.: NEVER TRUE

## 2025-01-08 ASSESSMENT — ENCOUNTER SYMPTOMS
CHEST TIGHTNESS: 0
NAUSEA: 0
ABDOMINAL DISTENTION: 0
VOMITING: 0
DIARRHEA: 0
ABDOMINAL PAIN: 1
CONSTIPATION: 0
SHORTNESS OF BREATH: 0

## 2025-01-08 ASSESSMENT — PATIENT HEALTH QUESTIONNAIRE - PHQ9
3. TROUBLE FALLING OR STAYING ASLEEP: SEVERAL DAYS
SUM OF ALL RESPONSES TO PHQ9 QUESTIONS 1 & 2: 0
10. IF YOU CHECKED OFF ANY PROBLEMS, HOW DIFFICULT HAVE THESE PROBLEMS MADE IT FOR YOU TO DO YOUR WORK, TAKE CARE OF THINGS AT HOME, OR GET ALONG WITH OTHER PEOPLE: NOT DIFFICULT AT ALL
2. FEELING DOWN, DEPRESSED OR HOPELESS: NOT AT ALL
7. TROUBLE CONCENTRATING ON THINGS, SUCH AS READING THE NEWSPAPER OR WATCHING TELEVISION: NOT AT ALL
SUM OF ALL RESPONSES TO PHQ QUESTIONS 1-9: 7
8. MOVING OR SPEAKING SO SLOWLY THAT OTHER PEOPLE COULD HAVE NOTICED. OR THE OPPOSITE, BEING SO FIGETY OR RESTLESS THAT YOU HAVE BEEN MOVING AROUND A LOT MORE THAN USUAL: NOT AT ALL
6. FEELING BAD ABOUT YOURSELF - OR THAT YOU ARE A FAILURE OR HAVE LET YOURSELF OR YOUR FAMILY DOWN: NOT AT ALL
1. LITTLE INTEREST OR PLEASURE IN DOING THINGS: NOT AT ALL
9. THOUGHTS THAT YOU WOULD BE BETTER OFF DEAD, OR OF HURTING YOURSELF: NOT AT ALL
4. FEELING TIRED OR HAVING LITTLE ENERGY: NEARLY EVERY DAY
SUM OF ALL RESPONSES TO PHQ QUESTIONS 1-9: 7
5. POOR APPETITE OR OVEREATING: NEARLY EVERY DAY
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
## 2025-01-09 PROBLEM — N61.1 ABSCESS OF RIGHT BREAST: Status: RESOLVED | Noted: 2019-08-29 | Resolved: 2025-01-09

## 2025-01-09 PROBLEM — J01.90 ACUTE BACTERIAL SINUSITIS: Status: RESOLVED | Noted: 2021-10-27 | Resolved: 2025-01-09

## 2025-01-09 PROBLEM — B96.89 ACUTE BACTERIAL SINUSITIS: Status: RESOLVED | Noted: 2021-10-27 | Resolved: 2025-01-09

## 2025-01-09 PROBLEM — N28.89 LEFT RENAL MASS: Status: RESOLVED | Noted: 2019-11-19 | Resolved: 2025-01-09

## 2025-01-09 LAB — ANA SER QL IA: NEGATIVE

## 2025-01-21 ENCOUNTER — OFFICE VISIT (OUTPATIENT)
Dept: OBGYN CLINIC | Age: 35
End: 2025-01-21
Payer: COMMERCIAL

## 2025-01-21 VITALS
HEIGHT: 66 IN | SYSTOLIC BLOOD PRESSURE: 121 MMHG | WEIGHT: 240 LBS | HEART RATE: 80 BPM | OXYGEN SATURATION: 98 % | BODY MASS INDEX: 38.57 KG/M2 | DIASTOLIC BLOOD PRESSURE: 75 MMHG

## 2025-01-21 DIAGNOSIS — Z76.89 ENCOUNTER TO ESTABLISH CARE: ICD-10-CM

## 2025-01-21 DIAGNOSIS — N80.9 ENDOMETRIOSIS: Primary | ICD-10-CM

## 2025-01-21 PROCEDURE — 99203 OFFICE O/P NEW LOW 30 MIN: CPT | Performed by: OBSTETRICS & GYNECOLOGY
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
## 2025-01-27 ENCOUNTER — OFFICE VISIT (OUTPATIENT)
Dept: INTERNAL MEDICINE CLINIC | Age: 35
End: 2025-01-27
Payer: COMMERCIAL

## 2025-01-27 VITALS
WEIGHT: 236.8 LBS | OXYGEN SATURATION: 99 % | HEIGHT: 66 IN | SYSTOLIC BLOOD PRESSURE: 108 MMHG | TEMPERATURE: 97.7 F | HEART RATE: 73 BPM | DIASTOLIC BLOOD PRESSURE: 70 MMHG | BODY MASS INDEX: 38.06 KG/M2

## 2025-01-27 DIAGNOSIS — R09.81 SINUS CONGESTION: ICD-10-CM

## 2025-01-27 DIAGNOSIS — H92.03 OTALGIA OF BOTH EARS: ICD-10-CM

## 2025-01-27 DIAGNOSIS — J02.9 PHARYNGITIS, UNSPECIFIED ETIOLOGY: Primary | ICD-10-CM

## 2025-01-27 LAB — S PYO AG THROAT QL: NORMAL

## 2025-01-27 PROCEDURE — 99213 OFFICE O/P EST LOW 20 MIN: CPT | Performed by: NURSE PRACTITIONER

## 2025-01-27 PROCEDURE — 87880 STREP A ASSAY W/OPTIC: CPT | Performed by: NURSE PRACTITIONER

## 2025-01-27 ASSESSMENT — ENCOUNTER SYMPTOMS
SHORTNESS OF BREATH: 0
ABDOMINAL DISTENTION: 0
CONSTIPATION: 0
NAUSEA: 0
CHEST TIGHTNESS: 0
VOMITING: 0
DIARRHEA: 0

## 2025-01-27 NOTE — PROGRESS NOTES
Conchita Echeverria (:  1990) is a 34 y.o. female, here for evaluation of the following chief complaint(s):  Congestion and Cough (Dry Cough, Mckay ear pain,  Head Congestion. Body Aches, started past Friday )         Assessment & Plan  1. Upper respiratory infection.  Her symptoms do not align with a diagnosis of influenza or COVID-19, and there is no evidence of an ear infection. The ear pain and inflammation are likely due to congestion, causing fluid accumulation behind the eardrum and impaired eustachian drainage. The lymph nodes have slightly enlarged. The redness in her throat could be indicative of strep throat, although the absence of white exudate is not typical for this condition. The symptoms suggest a possible norovirus infection. A swab for strep will be obtained. She is advised to continue taking Mucinex around the clock and to add ibuprofen or naproxen for additional relief. Sinus rinses or nasal saline are recommended to help clear congestion. A decongestant may also be used if tolerated. If her symptoms significantly worsen, extend to the lungs, or persist beyond a week, more aggressive symptom management will be considered.    Results  Laboratory Studies  COVID-19 test was negative.  1. Pharyngitis, unspecified etiology  -     POCT rapid strep A  2. Otalgia of both ears  -     POCT rapid strep A  3. Sinus congestion  -     POCT rapid strep A    Return if symptoms worsen or fail to improve.       Subjective   History of Present Illness  The patient presents for evaluation of upper respiratory symptoms.    She began experiencing symptoms on Friday morning, which included body aches, nasal congestion, headache, and sore throat. These symptoms have since progressed to include ear pain, particularly in one ear, extending down into her neck. She reports no fever or chest-related symptoms. She does not believe she has influenza but describes a sensation of pressure in her head. There is no reported

## 2025-01-27 NOTE — PROGRESS NOTES
Conchita Echeverria (:  1990) is a 34 y.o. female, here for evaluation of the following chief complaint(s):  Congestion and Cough (Dry Cough, Mckay ear pain,  Head Congestion. Body Aches, started past Friday )         Assessment & Plan      Results    {There are no diagnoses linked to this encounter. (Refresh or delete this SmartLink)}  No follow-ups on file.       Subjective   History of Present Illness      Review of Systems     Chief Complaint   Patient presents with    Congestion    Cough     Dry Cough, Mckay ear pain,  Head Congestion. Body Aches, started past Friday      She is a 34 y.o. female who presents for evalution.     Reviewed PmHx, RxHx, FmHx, SocHx, AllgHx and updated and dated in the chart.    CURRENT MEDS W/ ASSOC DIAG           Start Date End Date     norethindrone-ethinyl estradiol-Fe (LO LOESTRIN FE) 1 MG-10 MCG / 10 MCG tablet  25  --     Take 1 tablet by mouth daily Please skip placebo pills     Associated Diagnoses:  --     Norgestimate-Eth Estradiol (SPRINTEC 28 PO)  --  --     Associated Diagnoses:  --             Patient Active Problem List   Diagnosis Code    Insomnia G47.00    Migraine with aura G43.109    Pelvic peritoneal endometriosis N80.30    Dysmenorrhea N94.6    Family history of ischemic heart disease Z82.49    Obesity (BMI 30-39.9) E66.9    History of ovarian cyst Z87.42    Interstitial cystitis N30.10    Vitamin D deficiency E55.9    Depression F32.A    Snoring R06.83    Chronic fatigue R53.82    Palpitations R00.2    Precordial pain R07.2    CRP elevated R79.82    Arthralgia of shoulder region M25.519    Protruded cervical disc M50.20        Review of Systems - negative except as listed above in the HPI    Objective   Blood pressure 108/70, pulse 73, temperature 97.7 °F (36.5 °C), temperature source Temporal, height 1.676 m (5' 5.98\"), weight 107.4 kg (236 lb 12.8 oz), last menstrual period 2025, SpO2 99%, not currently breastfeeding.  Physical Exam

## 2025-03-03 ENCOUNTER — PATIENT MESSAGE (OUTPATIENT)
Dept: OBGYN CLINIC | Age: 35
End: 2025-03-03

## 2025-04-28 ENCOUNTER — OFFICE VISIT (OUTPATIENT)
Dept: OBGYN CLINIC | Age: 35
End: 2025-04-28
Payer: COMMERCIAL

## 2025-04-28 VITALS
SYSTOLIC BLOOD PRESSURE: 116 MMHG | WEIGHT: 240 LBS | DIASTOLIC BLOOD PRESSURE: 78 MMHG | BODY MASS INDEX: 38.57 KG/M2 | HEART RATE: 68 BPM | HEIGHT: 66 IN | OXYGEN SATURATION: 98 %

## 2025-04-28 DIAGNOSIS — N80.9 ENDOMETRIOSIS: Primary | ICD-10-CM

## 2025-04-28 DIAGNOSIS — N92.0 MENORRHAGIA WITH REGULAR CYCLE: ICD-10-CM

## 2025-04-28 DIAGNOSIS — R10.2 PELVIC PAIN: ICD-10-CM

## 2025-04-28 PROCEDURE — 99213 OFFICE O/P EST LOW 20 MIN: CPT | Performed by: OBSTETRICS & GYNECOLOGY

## 2025-04-28 NOTE — PROGRESS NOTES
Return Gyn Office Visit    CC:   Chief Complaint   Patient presents with    Follow-up     Vaginal bleeding, cyst with rupture, painful cramps        HPI:  35 y.o.  who presents to office for discussion regarding endometriosis. Has known history of endometriosis (biopsy proven from surgery 2020, also had surgery in  as well). Had been on OCP in the past which helps with the pain but has other side effects that are not tolerable for her. Previously has used orilissa for a few months as well but was making her extremely nauseated and wasn't covered by insurance.     Also getting cysts that are rupturing, was in the ER last 2024 and causes severe pain.     Periods are Q20-24d, 5-7d, pain starts before her period, on heaviest days is using 10 pads/day. +clots.     Done with childbearing and may eventually want a hysterectomy but would like to try other things first.    Objective:  /78   Pulse 68   Ht 1.676 m (5' 6\")   Wt 108.9 kg (240 lb)   SpO2 98%   BMI 38.74 kg/m²   Physical Exam  HENT:      Head: Normocephalic.   Cardiovascular:      Rate and Rhythm: Normal rate.   Pulmonary:      Effort: Pulmonary effort is normal.   Abdominal:      Palpations: Abdomen is soft.   Neurological:      General: No focal deficit present.      Mental Status: She is alert.   Psychiatric:         Mood and Affect: Mood normal.         Assessment/Plan   Diagnosis Orders   1. Endometriosis        2. Pelvic pain        3. Menorrhagia with regular cycle          Patient with long history of endometriosis. Suspect component of possible adenomyosis and hemorrhagic cysts as well contributing to symptoms. Has trialed OCP and orilissa in the past with some relief. Options for further management discussed, questions answered. Will trial slynd for 2-3 months and then reassess at that time. Rx sent    Dispo: PRN or 3 months for f/u  Kelsey Vance MD

## 2025-05-19 ENCOUNTER — HOSPITAL ENCOUNTER (OUTPATIENT)
Dept: CT IMAGING | Age: 35
Discharge: HOME | End: 2025-05-19
Payer: COMMERCIAL

## 2025-05-19 ENCOUNTER — OFFICE VISIT (OUTPATIENT)
Dept: INTERNAL MEDICINE CLINIC | Age: 35
End: 2025-05-19
Payer: COMMERCIAL

## 2025-05-19 ENCOUNTER — TELEPHONE (OUTPATIENT)
Dept: INTERNAL MEDICINE CLINIC | Age: 35
End: 2025-05-19

## 2025-05-19 VITALS
HEART RATE: 81 BPM | BODY MASS INDEX: 38.09 KG/M2 | SYSTOLIC BLOOD PRESSURE: 107 MMHG | WEIGHT: 236 LBS | DIASTOLIC BLOOD PRESSURE: 67 MMHG | OXYGEN SATURATION: 98 %

## 2025-05-19 DIAGNOSIS — R53.1 RIGHT SIDED WEAKNESS: ICD-10-CM

## 2025-05-19 DIAGNOSIS — R07.9 CHEST PAIN, UNSPECIFIED TYPE: Primary | ICD-10-CM

## 2025-05-19 DIAGNOSIS — R51.9 ACUTE NONINTRACTABLE HEADACHE, UNSPECIFIED HEADACHE TYPE: ICD-10-CM

## 2025-05-19 PROCEDURE — 99214 OFFICE O/P EST MOD 30 MIN: CPT | Performed by: NURSE PRACTITIONER

## 2025-05-19 PROCEDURE — 70450 CT HEAD/BRAIN W/O DYE: CPT

## 2025-05-19 PROCEDURE — 93000 ELECTROCARDIOGRAM COMPLETE: CPT | Performed by: NURSE PRACTITIONER

## 2025-05-19 ASSESSMENT — ENCOUNTER SYMPTOMS
NAUSEA: 0
BACK PAIN: 0
DIARRHEA: 0
CHEST TIGHTNESS: 0
SHORTNESS OF BREATH: 0
TROUBLE SWALLOWING: 0
SINUS PRESSURE: 0
VOMITING: 0
CONSTIPATION: 0
ABDOMINAL DISTENTION: 0

## 2025-05-19 NOTE — TELEPHONE ENCOUNTER
Phoned [facility] to get an appt for patient.     Patient will see Dr. Castillo tomorrow (5-20) at 9:50a at the [facility]

## 2025-05-19 NOTE — PROGRESS NOTES
Conchita Echeverria (:  1990) is a 35 y.o. female, here for evaluation of the following chief complaint(s):  Headache (Weird headaches woke patient up out of her sleep at around 4:15 am. Still has a mild headache. ), Extremity Weakness (Right arm start to become tingly/ numb. Denied any pain ), and Other (Patient stated she just feels off/wrong.//Right eye is blood shot, unsure if this is related as it started last week. //Before appointment patients bp was 150/85)       Assessment & Plan  1. Headache.  - The patient reports waking up with a severe headache described as a stabbing pain in the right back side of the head, which later settled into a dull headache across the forehead and both eyes.  - Blood pressure and heart rate are within normal limits. The headache does not present as a typical migraine.  - A CT scan of the head will be ordered to rule out any serious conditions.  - She is advised to avoid ibuprofen and use Tylenol if needed for pain management. If she experiences increased headaches, sensory changes, or visual disturbances, she should seek immediate medical attention.    2. Right arm heaviness and tingling.  - The patient reports her right arm feeling very heavy and tingly, with a sensation similar to being cold.  - There is noted weakness in the right  strength. Reflexes are good, but there is visual sensitivity and photosensitivity.  - A CT scan of the head will be ordered to further investigate these symptoms.  - If the CT scan is normal, close monitoring will be recommended.    3. Visual disturbances.  - The patient reports seeing floaters, which are worse in one eye.  - She has seen an eye doctor in the past who did not find any significant issues.  - The visual disturbances could be related to multiple comprehension recurrent strokes that happen in the eye.  - A CT scan of the head will be ordered to rule out any serious conditions.    Results  Imaging   - MRI of the neck:

## 2025-05-20 ENCOUNTER — TRANSCRIBE ORDERS (OUTPATIENT)
Dept: ADMINISTRATIVE | Age: 35
End: 2025-05-20

## 2025-05-20 ENCOUNTER — HOSPITAL ENCOUNTER (OUTPATIENT)
Age: 35
Discharge: HOME | End: 2025-05-20
Payer: COMMERCIAL

## 2025-05-20 ENCOUNTER — RESULTS FOLLOW-UP (OUTPATIENT)
Dept: INTERNAL MEDICINE CLINIC | Age: 35
End: 2025-05-20

## 2025-05-20 ENCOUNTER — TELEPHONE (OUTPATIENT)
Dept: INTERNAL MEDICINE CLINIC | Age: 35
End: 2025-05-20

## 2025-05-20 DIAGNOSIS — R45.89 ANXIETY ABOUT HEALTH: ICD-10-CM

## 2025-05-20 DIAGNOSIS — H47.20 UNSPECIFIED OPTIC ATROPHY: ICD-10-CM

## 2025-05-20 DIAGNOSIS — F41.9 ANXIETY: Primary | ICD-10-CM

## 2025-05-20 PROCEDURE — A9579 GAD-BASE MR CONTRAST NOS,1ML: HCPCS | Performed by: OPTOMETRIST

## 2025-05-20 PROCEDURE — 70544 MR ANGIOGRAPHY HEAD W/O DYE: CPT

## 2025-05-20 PROCEDURE — 6360000004 HC RX CONTRAST MEDICATION: Performed by: OPTOMETRIST

## 2025-05-20 PROCEDURE — 70553 MRI BRAIN STEM W/O & W/DYE: CPT

## 2025-05-20 RX ORDER — LORAZEPAM 0.5 MG/1
0.5 TABLET ORAL 2 TIMES DAILY PRN
Qty: 30 TABLET | Refills: 0 | Status: SHIPPED | OUTPATIENT
Start: 2025-05-20 | End: 2025-06-04

## 2025-05-20 RX ADMIN — GADOTERIDOL 20 ML: 279.3 INJECTION, SOLUTION INTRAVENOUS at 15:05

## 2025-05-20 NOTE — TELEPHONE ENCOUNTER
Phoned [facility] and scheduled an MRI and MRA stat for patient per orders from [facility].    Faxed to [facility] - [phone]    Scheduled patient for 3:30    Patient informed

## 2025-05-20 NOTE — TELEPHONE ENCOUNTER
Pt spoke with PCP and after seeing eye doc this AM has and MRI and MRA ordered to be done right away.  PCP sending medication this date for anxiety.  Order pended per PCP instruction.

## 2025-05-21 ENCOUNTER — TELEPHONE (OUTPATIENT)
Dept: INTERNAL MEDICINE CLINIC | Age: 35
End: 2025-05-21

## 2025-05-21 ENCOUNTER — TELEPHONE (OUTPATIENT)
Dept: NEUROLOGY | Age: 35
End: 2025-05-21

## 2025-05-21 DIAGNOSIS — R53.1 RIGHT SIDED WEAKNESS: ICD-10-CM

## 2025-05-21 DIAGNOSIS — R53.1 RIGHT SIDED WEAKNESS: Primary | ICD-10-CM

## 2025-05-21 DIAGNOSIS — R51.9 ACUTE NONINTRACTABLE HEADACHE, UNSPECIFIED HEADACHE TYPE: ICD-10-CM

## 2025-05-21 DIAGNOSIS — H53.9 VISUAL CHANGES: Primary | ICD-10-CM

## 2025-05-21 RX ORDER — PREDNISONE 10 MG/1
TABLET ORAL
Qty: 30 TABLET | Refills: 0 | Status: SHIPPED | OUTPATIENT
Start: 2025-05-21

## 2025-05-21 NOTE — TELEPHONE ENCOUNTER
Sandy with pcp office calling to see if patient could be worked in sooner than next available 8/26/25. Do you want to work patient in sooner or keep appt as scheduled?

## 2025-05-21 NOTE — TELEPHONE ENCOUNTER
Office visit notes from [facility] received and scanned into media.     Please review and advise on appt. Patient scheduled 8/26/25

## 2025-05-21 NOTE — TELEPHONE ENCOUNTER
Pt called and requested that labs, insurance info and [facility] note all be faxed to [facility] for approval to schedule.  Referral order entered.  Other items printed.

## 2025-05-21 NOTE — TELEPHONE ENCOUNTER
----- Message from Dr. George Elam MD sent at 5/21/2025  8:06 AM EDT -----  Regarding: new patient  Please schedule new patient visit for this patient for optic nerve atrophy. Please ask Conchita Echeverria what ophthalmologist or office where she was seen and fond to have optic nerve atrophy and request records from this provider/office.

## 2025-05-21 NOTE — TELEPHONE ENCOUNTER
Patient is scheduled for 8/26/25 (next available) and request was sent to [facility] to get records. Office is watching for those records    Pcp called and wanted patient worked in sooner.    Please advise on appt time

## 2025-05-22 ENCOUNTER — TELEPHONE (OUTPATIENT)
Dept: INTERNAL MEDICINE CLINIC | Age: 35
End: 2025-05-22

## 2025-05-22 DIAGNOSIS — G43.909 MIGRAINE WITHOUT STATUS MIGRAINOSUS, NOT INTRACTABLE, UNSPECIFIED MIGRAINE TYPE: Primary | ICD-10-CM

## 2025-05-22 LAB
ALBUMIN SERPL-MCNC: 4.2 G/DL (ref 3.4–5)
ALBUMIN/GLOB SERPL: 1.5 {RATIO} (ref 1.1–2.2)
ALP SERPL-CCNC: 92 U/L (ref 40–129)
ALT SERPL-CCNC: 18 U/L (ref 10–40)
ANION GAP SERPL CALCULATED.3IONS-SCNC: 10 MMOL/L (ref 3–16)
AST SERPL-CCNC: 19 U/L (ref 15–37)
BASOPHILS # BLD: 0.1 K/UL (ref 0–0.2)
BASOPHILS NFR BLD: 1.1 %
BILIRUB SERPL-MCNC: <0.2 MG/DL (ref 0–1)
BUN SERPL-MCNC: 14 MG/DL (ref 7–20)
CALCIUM SERPL-MCNC: 9 MG/DL (ref 8.3–10.6)
CHLORIDE SERPL-SCNC: 103 MMOL/L (ref 99–110)
CO2 SERPL-SCNC: 22 MMOL/L (ref 21–32)
CREAT SERPL-MCNC: 0.6 MG/DL (ref 0.6–1.1)
CRP SERPL-MCNC: <3 MG/L (ref 0–5.1)
DEPRECATED RDW RBC AUTO: 13.7 % (ref 12.4–15.4)
EOSINOPHIL # BLD: 0.2 K/UL (ref 0–0.6)
EOSINOPHIL NFR BLD: 2.8 %
GFR SERPLBLD CREATININE-BSD FMLA CKD-EPI: >90 ML/MIN/{1.73_M2}
GLUCOSE SERPL-MCNC: 103 MG/DL (ref 70–99)
HCT VFR BLD AUTO: 36.6 % (ref 36–48)
HGB BLD-MCNC: 12.5 G/DL (ref 12–16)
LACTATE BLDV-SCNC: 1 MMOL/L (ref 0.4–2)
LYMPHOCYTES # BLD: 1.9 K/UL (ref 1–5.1)
LYMPHOCYTES NFR BLD: 30.4 %
MCH RBC QN AUTO: 28.1 PG (ref 26–34)
MCHC RBC AUTO-ENTMCNC: 34.1 G/DL (ref 31–36)
MCV RBC AUTO: 82.2 FL (ref 80–100)
MONOCYTES # BLD: 0.3 K/UL (ref 0–1.3)
MONOCYTES NFR BLD: 5.5 %
NEUTROPHILS # BLD: 3.8 K/UL (ref 1.7–7.7)
NEUTROPHILS NFR BLD: 60.2 %
PLATELET # BLD AUTO: 304 K/UL (ref 135–450)
PMV BLD AUTO: 9.7 FL (ref 5–10.5)
POTASSIUM SERPL-SCNC: 3.9 MMOL/L (ref 3.5–5.1)
PROT SERPL-MCNC: 7 G/DL (ref 6.4–8.2)
RBC # BLD AUTO: 4.45 M/UL (ref 4–5.2)
SODIUM SERPL-SCNC: 135 MMOL/L (ref 136–145)
WBC # BLD AUTO: 6.2 K/UL (ref 4–11)

## 2025-05-23 LAB
ANA SER QL IA: NEGATIVE
ERYTHROCYTE [SEDIMENTATION RATE] IN BLOOD BY WESTERGREN METHOD: 18 MM/HR (ref 0–20)

## 2025-06-06 ENCOUNTER — TRANSCRIBE ORDERS (OUTPATIENT)
Dept: ADMINISTRATIVE | Age: 35
End: 2025-06-06

## 2025-06-06 DIAGNOSIS — R20.0 NUMBNESS: Primary | ICD-10-CM

## 2025-06-06 DIAGNOSIS — R53.1 WEAKNESS: ICD-10-CM

## 2025-06-07 ENCOUNTER — HOSPITAL ENCOUNTER (OUTPATIENT)
Dept: LAB | Age: 35
Discharge: HOME | End: 2025-06-07
Payer: COMMERCIAL

## 2025-06-07 ENCOUNTER — TRANSCRIBE ORDERS (OUTPATIENT)
Dept: LAB | Age: 35
End: 2025-06-07

## 2025-06-07 DIAGNOSIS — R20.0 NUMBNESS: Primary | ICD-10-CM

## 2025-06-07 DIAGNOSIS — R53.1 WEAKNESS: ICD-10-CM

## 2025-06-07 DIAGNOSIS — R20.0 NUMBNESS: ICD-10-CM

## 2025-06-07 PROCEDURE — 84425 ASSAY OF VITAMIN B-1: CPT

## 2025-06-07 PROCEDURE — 82746 ASSAY OF FOLIC ACID SERUM: CPT

## 2025-06-07 PROCEDURE — 82607 VITAMIN B-12: CPT

## 2025-06-07 PROCEDURE — 84443 ASSAY THYROID STIM HORMONE: CPT

## 2025-06-07 PROCEDURE — 36415 COLL VENOUS BLD VENIPUNCTURE: CPT

## 2025-06-07 PROCEDURE — 83921 ORGANIC ACID SINGLE QUANT: CPT

## 2025-06-08 LAB
FOLATE SERPL-MCNC: 9.22 NG/ML (ref 4.78–24.2)
TSH SERPL DL<=0.005 MIU/L-ACNC: 1.94 UIU/ML (ref 0.27–4.2)
VIT B12 SERPL-MCNC: 398 PG/ML (ref 211–911)

## 2025-06-12 LAB
METHYLMALONATE SERPL-SCNC: 0.12 UMOL/L (ref 0–0.4)
VIT B1 BLD-MCNC: 155 NMOL/L (ref 70–180)

## 2025-06-19 ENCOUNTER — TRANSCRIBE ORDERS (OUTPATIENT)
Dept: ADMINISTRATIVE | Age: 35
End: 2025-06-19

## 2025-06-19 DIAGNOSIS — M50.30 DDD (DEGENERATIVE DISC DISEASE), CERVICAL: Primary | ICD-10-CM

## 2025-06-19 DIAGNOSIS — R53.1 WEAKNESS OF RIGHT SIDE OF BODY: ICD-10-CM

## 2025-06-26 ENCOUNTER — CLINICAL SUPPORT (OUTPATIENT)
Dept: INTERNAL MEDICINE CLINIC | Age: 35
End: 2025-06-26
Payer: COMMERCIAL

## 2025-06-26 DIAGNOSIS — R10.2 PELVIC PAIN: Primary | ICD-10-CM

## 2025-06-26 PROCEDURE — 96372 THER/PROPH/DIAG INJ SC/IM: CPT | Performed by: NURSE PRACTITIONER

## 2025-06-26 RX ORDER — KETOROLAC TROMETHAMINE 30 MG/ML
30 INJECTION, SOLUTION INTRAMUSCULAR; INTRAVENOUS ONCE
Status: COMPLETED | OUTPATIENT
Start: 2025-06-26 | End: 2025-06-26

## 2025-06-26 RX ORDER — KETOROLAC TROMETHAMINE 30 MG/ML
30 INJECTION, SOLUTION INTRAMUSCULAR; INTRAVENOUS ONCE
Qty: 1 ML | Refills: 0
Start: 2025-06-26 | End: 2025-06-26

## 2025-06-26 RX ADMIN — KETOROLAC TROMETHAMINE 30 MG: 30 INJECTION, SOLUTION INTRAMUSCULAR; INTRAVENOUS at 09:08

## 2025-07-03 LAB
CHOLEST SERPL-MCNC: 141 MG/DL (ref 0–199)
GLUCOSE SERPL-MCNC: 106 MG/DL (ref 70–99)
HDLC SERPL-MCNC: 36 MG/DL (ref 40–60)
LDLC SERPL CALC-MCNC: 89 MG/DL
TRIGL SERPL-MCNC: 78 MG/DL (ref 0–150)

## 2025-08-13 ENCOUNTER — CLINICAL SUPPORT (OUTPATIENT)
Dept: INTERNAL MEDICINE CLINIC | Age: 35
End: 2025-08-13
Payer: COMMERCIAL

## 2025-08-13 DIAGNOSIS — M54.9 BACK PAIN, UNSPECIFIED BACK LOCATION, UNSPECIFIED BACK PAIN LATERALITY, UNSPECIFIED CHRONICITY: Primary | ICD-10-CM

## 2025-08-13 PROCEDURE — 96372 THER/PROPH/DIAG INJ SC/IM: CPT | Performed by: NURSE PRACTITIONER

## 2025-08-13 RX ORDER — KETOROLAC TROMETHAMINE 30 MG/ML
30 INJECTION, SOLUTION INTRAMUSCULAR; INTRAVENOUS ONCE
Status: COMPLETED | OUTPATIENT
Start: 2025-08-13 | End: 2025-08-13

## 2025-08-13 RX ORDER — KETOROLAC TROMETHAMINE 30 MG/ML
30 INJECTION, SOLUTION INTRAMUSCULAR; INTRAVENOUS ONCE
Qty: 1 ML | Refills: 0
Start: 2025-08-13 | End: 2025-08-13 | Stop reason: CLARIF

## 2025-08-13 RX ADMIN — KETOROLAC TROMETHAMINE 30 MG: 30 INJECTION, SOLUTION INTRAMUSCULAR; INTRAVENOUS at 07:40

## 2025-08-26 ENCOUNTER — OFFICE VISIT (OUTPATIENT)
Dept: OBGYN CLINIC | Age: 35
End: 2025-08-26
Payer: COMMERCIAL

## 2025-08-26 VITALS — HEIGHT: 66 IN | WEIGHT: 239.9 LBS | OXYGEN SATURATION: 98 % | HEART RATE: 68 BPM | BODY MASS INDEX: 38.56 KG/M2

## 2025-08-26 DIAGNOSIS — N80.9 ENDOMETRIOSIS: ICD-10-CM

## 2025-08-26 DIAGNOSIS — R10.2 PELVIC PAIN: Primary | ICD-10-CM

## 2025-08-26 PROCEDURE — 99214 OFFICE O/P EST MOD 30 MIN: CPT | Performed by: OBSTETRICS & GYNECOLOGY

## (undated) DEVICE — TROCARS: Brand: KII® BALLOON BLUNT TIP SYSTEM

## (undated) DEVICE — GAUZE,PACKING STRIP,IODOFORM,1/2"X5YD,ST: Brand: CURAD

## (undated) DEVICE — TROCAR: Brand: KII SHIELDED BLADED ACCESS SYSTEM

## (undated) DEVICE — SOLUTION PREP POVIDONE IOD FOR SKIN MUCOUS MEM PRIOR TO

## (undated) DEVICE — TROCAR: Brand: KII FIOS FIRST ENTRY

## (undated) DEVICE — GLOVE ORANGE PI 7 1/2   MSG9075

## (undated) DEVICE — PAD TBL OP RM TRENDELENBURG STATIC TORSO W/STRAPS

## (undated) DEVICE — 3M™ STERI-STRIP™ REINFORCED ADHESIVE SKIN CLOSURES, R1547, 1/2 IN X 4 IN (12 MM X 100 MM), 6 STRIPS/ENVELOPE: Brand: 3M™ STERI-STRIP™

## (undated) DEVICE — TIP COVER ACCESSORY

## (undated) DEVICE — TUBING FLTR PLUME AWAY EVAC W/ SUCT DEV DISP PUREVIEW

## (undated) DEVICE — TELFA NON-ADHERENT ABSORBENT DRESSING: Brand: TELFA

## (undated) DEVICE — 3M™ IOBAN™ 2 ANTIMICROBIAL INCISE DRAPE 6650EZ: Brand: IOBAN™ 2

## (undated) DEVICE — SUTURE PERMA HND 2-0 L18IN NONABSORBABLE BLK X-1 L22IN 1/2 737G

## (undated) DEVICE — GAUZE,SPONGE,2"X2",8PLY,STERILE,LF,2'S: Brand: MEDLINE

## (undated) DEVICE — TOTAL TRAY, DB, 100% SILI FOLEY, 16FR 10: Brand: MEDLINE

## (undated) DEVICE — PUMP SUC IRR TBNG L10FT W/ HNDPC ASSEMB STRYKEFLOW 2

## (undated) DEVICE — ARM DRAPE

## (undated) DEVICE — CLEANER,CAUTERY TIP,2X2",STERILE: Brand: MEDLINE

## (undated) DEVICE — SOLUTION SCRB 4OZ 10% POVIDONE IOD ANTIMIC BTL

## (undated) DEVICE — SOLUTION IV IRRIG WATER 1000ML POUR BRL 2F7114

## (undated) DEVICE — Device

## (undated) DEVICE — CANNULA SEAL

## (undated) DEVICE — PROGRASP FORCEPS: Brand: ENDOWRIST

## (undated) DEVICE — 3M™ TEGADERM™ TRANSPARENT FILM DRESSING FRAME STYLE, 1626W, 4 IN X 4-3/4 IN (10 CM X 12 CM), 50/CT 4CT/CASE: Brand: 3M™ TEGADERM™

## (undated) DEVICE — GAUZE,SPONGE,4"X4",16PLY,XRAY,STRL,LF: Brand: MEDLINE

## (undated) DEVICE — GOWN SIRUS NONREIN XL W/TWL: Brand: MEDLINE INDUSTRIES, INC.

## (undated) DEVICE — TRAY PREP DRY W/ PREM GLV 2 APPL 6 SPNG 2 UNDPD 1 OVERWRAP

## (undated) DEVICE — CLIP LIG M TI 6 SIL HNDL FOR OPN AND ENDOSCP SGL APPL

## (undated) DEVICE — MEDI-VAC NON-CONDUCTIVE SUCTION TUBING: Brand: CARDINAL HEALTH

## (undated) DEVICE — GLOVE,SURG,SENSICARE SLT,LF,PF,7.5: Brand: MEDLINE

## (undated) DEVICE — MARYLAND BIPOLAR FORCEPS: Brand: ENDOWRIST

## (undated) DEVICE — SYRINGE MED 10ML LUERLOCK TIP W/O SFTY DISP

## (undated) DEVICE — DEVICE MANIP L330MM DIA4.5MM UTER DISP INJ ZINNANTI KRONNER

## (undated) DEVICE — TUBING, SUCTION, 3/16" X 12', STRAIGHT: Brand: MEDLINE

## (undated) DEVICE — TISSUE RETRIEVAL SYSTEM: Brand: INZII RETRIEVAL SYSTEM

## (undated) DEVICE — TOWEL,OR,DSP,ST,BLUE,STD,4/PK,20PK/CS: Brand: MEDLINE

## (undated) DEVICE — MINOR SET UP PK

## (undated) DEVICE — SUTURE VCRL + SZ 3-0 L18IN ABSRB UD SH 1/2 CIR TAPERCUT NDL VCP864D

## (undated) DEVICE — DRAPE,T,LAPARO,TRANS,STERILE: Brand: MEDLINE

## (undated) DEVICE — SOLUTION IV IRRIG 1000ML POUR BTL 2F7114

## (undated) DEVICE — NEEDLE SPNL 22GA L3.5IN BLK HUB S STL REG WALL FIT STYL W/

## (undated) DEVICE — 3M™ STERI-STRIP™ COMPOUND BENZOIN TINCTURE 40 BAGS/CARTON 4 CARTONS/CASE C1544: Brand: 3M™ STERI-STRIP™

## (undated) DEVICE — COVER LT HNDL BLU PLAS

## (undated) DEVICE — SUTURE VCRL SZ 3-0 L27IN ABSRB UD L26MM SH 1/2 CIR J416H

## (undated) DEVICE — SET IV PMP 1 PRT CK VLV SPL SEPT PRTS 2 PC M LL 20 GTT LEN

## (undated) DEVICE — SUTURE PERMA-HAND SZ 2-0 L30IN NONABSORBABLE BLK L26MM SH K833H

## (undated) DEVICE — PENCIL ES CRD L10FT HND SWCHING ROCK SWCH W/ EDGE COAT BLDE

## (undated) DEVICE — GLOVE SURG SZ 75 L12IN FNGR THK79MIL GRN LTX FREE

## (undated) DEVICE — SOLUTION ANTIFOG VIS SYS CLEARIFY LAPSCP

## (undated) DEVICE — 3M™ TEGADERM™ TRANSPARENT FILM DRESSING FRAME STYLE, 1624W, 2-3/8 IN X 2-3/4 IN (6 CM X 7 CM), 100/CT 4CT/CASE: Brand: 3M™ TEGADERM™

## (undated) DEVICE — APPLIER SUT CLP FOR 2-0 3-0 4-0 VCRL ABSRB SUT

## (undated) DEVICE — NEEDLE SPNL 25GA L3.5IN BLU HUB S STL REG WALL FIT STYL W/

## (undated) DEVICE — MEGA SUTURECUT ND: Brand: ENDOWRIST

## (undated) DEVICE — SUTURE PERMA-HAND 2-0 L30IN NONABSORBABLE BLK MH L36MM 1/2 K843H

## (undated) DEVICE — SUTURE VCRL + SZ 4-0 L18IN ABSRB UD L19MM PS-2 3/8 CIR PRIM VCP496H

## (undated) DEVICE — SCISSORS SURG DIA8MM MPLR CRV ENDOWRIST

## (undated) DEVICE — SPONGE LAP W18XL18IN WHT COT 4 PLY FLD STRUNG RADPQ DISP ST

## (undated) DEVICE — Z DISCONTINUED BY MEDLINE USE 2711682 TRAY SKIN PREP DRY W/ PREM GLV

## (undated) DEVICE — RESERVOIR,SUCTION,100CC,SILICONE: Brand: MEDLINE

## (undated) DEVICE — TROCAR: Brand: KII SLEEVE

## (undated) DEVICE — CANISTER, RIGID, 1200CC: Brand: MEDLINE INDUSTRIES, INC.

## (undated) DEVICE — COLUMN DRAPE

## (undated) DEVICE — STRIP,CLOSURE,WOUND,MEDI-STRIP,1/2X4: Brand: MEDLINE

## (undated) DEVICE — SUTURE VCRL + SZ 0 L27IN ABSRB VLT L26MM UR-6 5/8 CIR VCP603H

## (undated) DEVICE — SPONGE GZ W4XL4IN COT 12 PLY TYP VII WVN C FLD DSGN

## (undated) DEVICE — GLOVE,SURG,SENSICARE SLT,LF,PF,7: Brand: MEDLINE

## (undated) DEVICE — SUTURE VCRL SZ 0 L36IN ABSRB UD CT-1 L36MM 1/2 CIR TAPR PNT VCP946H

## (undated) DEVICE — SUTURE ETHBND EXCEL SZ 0 L18IN NONABSORBABLE GRN L26MM CT-2 CX27D

## (undated) DEVICE — STANDARD HYPODERMIC NEEDLE,POLYPROPYLENE HUB: Brand: MONOJECT

## (undated) DEVICE — Z DISCONTINUED PER MEDLINE USE 2752023 DRESSING FOAM W7.62XL7.62CM SIL ADH WTRPRF FLM BK SQ SHP

## (undated) DEVICE — TROCAR: Brand: KII OPTICAL ACCESS SYSTEM

## (undated) DEVICE — 3M™ TEGADERM™ TRANSPARENT FILM DRESSING FRAME STYLE WITH BORDER, 1616, 4 IN X 4-3/4 IN (10 CM X 12 CM), 50/CT 4CT/CASE: Brand: 3M™ TEGADERM™

## (undated) DEVICE — STAPLER EXT SKIN 35 WIDE S STL STPL SQUEEZE HNDL VISISTAT

## (undated) DEVICE — BLADELESS OBTURATOR: Brand: WECK VISTA

## (undated) DEVICE — NEEDLE HYPO 25GA L1.5IN BVL ORIENTED ECLIPSE

## (undated) DEVICE — ELECTRODE PT RET AD L9FT HI MOIST COND ADH HYDRGEL CORDED

## (undated) DEVICE — Z DISCONTINUED USE 2275683 GLOVE SURG SZ 6.5 L12IN FNGR THK13MIL WHT ISOLEX

## (undated) DEVICE — GLOVE ORANGE PI 8   MSG9080

## (undated) DEVICE — Z DISCONTINUED GLOVE SURG SZ 7 L12IN FNGR THK13MIL WHT ISOLEX POLYISOPRENE

## (undated) DEVICE — [HIGH FLOW INSUFFLATOR,  DO NOT USE IF PACKAGE IS DAMAGED,  KEEP DRY,  KEEP AWAY FROM SUNLIGHT,  PROTECT FROM HEAT AND RADIOACTIVE SOURCES.]: Brand: PNEUMOSURE

## (undated) DEVICE — INSUFFLATION NEEDLE TO ESTABLISH PNEUMOPERITONEUM.: Brand: INSUFFLATION NEEDLE

## (undated) DEVICE — PAD,NON-ADHERENT,3X8,STERILE,LF,1/PK: Brand: MEDLINE

## (undated) DEVICE — Z DISCONTINUED NO SUB IDED GLOVE SURG SZ 6 L12IN FNGR THK13MIL WHT ISOLEX POLYISOPRENE

## (undated) DEVICE — LONG TIP FORCEPS: Brand: ENDOWRIST

## (undated) DEVICE — PACK PROCEDURE SURG GYN LAP CDS

## (undated) DEVICE — 35 ML SYRINGE LUER-LOCK TIP: Brand: MONOJECT

## (undated) DEVICE — SUTURE MCRYL SZ 4-0 L18IN ABSRB UD L19MM PS-2 3/8 CIR PRIM Y496G

## (undated) DEVICE — SOLUTION IV IRRIG POUR BRL 0.9% SODIUM CHL 2F7124

## (undated) DEVICE — Y-TYPE TUR/BLADDER IRRIGATION SET, REGULATING CLAMP

## (undated) DEVICE — DRAIN WND 15FR RND HUBLESS SIL W/ 3/16IN TRCR BLAK

## (undated) DEVICE — SUTURE MCRYL + SZ 4-0 L18IN ABSRB UD L19MM PS-2 3/8 CIR MCP496G

## (undated) DEVICE — Z DISCONTINUED USE 2275686 GLOVE SURG SZ 8 L12IN FNGR THK13MIL WHT ISOLEX POLYISOPRENE

## (undated) DEVICE — SUTURE VCRL SZ 4-0 L18IN ABSRB UD L19MM PS-2 3/8 CIR PRIM J496H

## (undated) DEVICE — SPONGE: LAP 4X18 W XR 200/CS: Brand: MEDICAL ACTION INDUSTRIES

## (undated) DEVICE — KIT OR ROOM TURNOVER W/STRAP

## (undated) DEVICE — ELECTROSURGICAL PENCIL BUTTON SWITCH NON COATED BLADE ELECTRODE 10 FT (3 M) CORD HOLSTER: Brand: MEGADYNE